# Patient Record
Sex: FEMALE | Race: WHITE | NOT HISPANIC OR LATINO
[De-identification: names, ages, dates, MRNs, and addresses within clinical notes are randomized per-mention and may not be internally consistent; named-entity substitution may affect disease eponyms.]

---

## 2018-03-15 ENCOUNTER — TRANSCRIPTION ENCOUNTER (OUTPATIENT)
Age: 73
End: 2018-03-15

## 2018-03-15 PROBLEM — Z00.00 ENCOUNTER FOR PREVENTIVE HEALTH EXAMINATION: Status: ACTIVE | Noted: 2018-03-15

## 2018-03-29 ENCOUNTER — OUTPATIENT (OUTPATIENT)
Dept: OUTPATIENT SERVICES | Facility: HOSPITAL | Age: 73
LOS: 1 days | End: 2018-03-29

## 2018-03-29 ENCOUNTER — APPOINTMENT (OUTPATIENT)
Dept: OPHTHALMOLOGY | Facility: CLINIC | Age: 73
End: 2018-03-29

## 2018-03-30 ENCOUNTER — TRANSCRIPTION ENCOUNTER (OUTPATIENT)
Age: 73
End: 2018-03-30

## 2018-03-30 DIAGNOSIS — H26.491 OTHER SECONDARY CATARACT, RIGHT EYE: ICD-10-CM

## 2018-04-12 ENCOUNTER — APPOINTMENT (OUTPATIENT)
Dept: OPHTHALMOLOGY | Facility: CLINIC | Age: 73
End: 2018-04-12

## 2018-04-12 ENCOUNTER — OUTPATIENT (OUTPATIENT)
Dept: OUTPATIENT SERVICES | Facility: HOSPITAL | Age: 73
LOS: 1 days | End: 2018-04-12

## 2018-04-12 DIAGNOSIS — H26.491 OTHER SECONDARY CATARACT, RIGHT EYE: ICD-10-CM

## 2018-04-17 ENCOUNTER — TRANSCRIPTION ENCOUNTER (OUTPATIENT)
Age: 73
End: 2018-04-17

## 2018-08-16 ENCOUNTER — INPATIENT (INPATIENT)
Facility: HOSPITAL | Age: 73
LOS: 13 days | Discharge: NURSING, REHAB OR LONG-TERM CARE | DRG: 871 | End: 2018-08-30
Payer: MEDICARE

## 2018-08-16 VITALS
DIASTOLIC BLOOD PRESSURE: 82 MMHG | SYSTOLIC BLOOD PRESSURE: 124 MMHG | HEART RATE: 115 BPM | WEIGHT: 134.04 LBS | TEMPERATURE: 100 F | RESPIRATION RATE: 18 BRPM | OXYGEN SATURATION: 97 % | HEIGHT: 64 IN

## 2018-08-16 LAB
ALBUMIN SERPL ELPH-MCNC: 4.1 G/DL — SIGNIFICANT CHANGE UP (ref 3.3–5)
ALP SERPL-CCNC: 116 U/L — SIGNIFICANT CHANGE UP (ref 40–120)
ALT FLD-CCNC: <5 U/L — LOW (ref 10–45)
ANION GAP SERPL CALC-SCNC: 16 MMOL/L — SIGNIFICANT CHANGE UP (ref 5–17)
APTT BLD: 25.8 SEC — LOW (ref 27.5–37.4)
AST SERPL-CCNC: 18 U/L — SIGNIFICANT CHANGE UP (ref 10–40)
BASE EXCESS BLDA CALC-SCNC: -5.8 MMOL/L — LOW (ref -2–3)
BASE EXCESS BLDV CALC-SCNC: -2.4 MMOL/L — SIGNIFICANT CHANGE UP
BASOPHILS NFR BLD AUTO: 0.2 % — SIGNIFICANT CHANGE UP (ref 0–2)
BILIRUB SERPL-MCNC: 0.6 MG/DL — SIGNIFICANT CHANGE UP (ref 0.2–1.2)
BUN SERPL-MCNC: 10 MG/DL — SIGNIFICANT CHANGE UP (ref 7–23)
CALCIUM SERPL-MCNC: 8.7 MG/DL — SIGNIFICANT CHANGE UP (ref 8.4–10.5)
CHLORIDE SERPL-SCNC: 95 MMOL/L — LOW (ref 96–108)
CK MB CFR SERPL CALC: 2.7 NG/ML — SIGNIFICANT CHANGE UP (ref 0–6.7)
CO2 SERPL-SCNC: 20 MMOL/L — LOW (ref 22–31)
CREAT SERPL-MCNC: 0.76 MG/DL — SIGNIFICANT CHANGE UP (ref 0.5–1.3)
EOSINOPHIL NFR BLD AUTO: 3.6 % — SIGNIFICANT CHANGE UP (ref 0–6)
GAS PNL BLDA: SIGNIFICANT CHANGE UP
GAS PNL BLDV: SIGNIFICANT CHANGE UP
GLUCOSE SERPL-MCNC: 118 MG/DL — HIGH (ref 70–99)
HCO3 BLDA-SCNC: 18 MMOL/L — LOW (ref 21–28)
HCO3 BLDV-SCNC: 22 MMOL/L — SIGNIFICANT CHANGE UP (ref 20–27)
HCT VFR BLD CALC: 26.5 % — LOW (ref 34.5–45)
HGB BLD-MCNC: 8.4 G/DL — LOW (ref 11.5–15.5)
INR BLD: 1.14 — SIGNIFICANT CHANGE UP (ref 0.88–1.16)
LACTATE SERPL-SCNC: 1.3 MMOL/L — SIGNIFICANT CHANGE UP (ref 0.5–2)
LYMPHOCYTES # BLD AUTO: 8.9 % — LOW (ref 13–44)
MAGNESIUM SERPL-MCNC: 1.6 MG/DL — SIGNIFICANT CHANGE UP (ref 1.6–2.6)
MCHC RBC-ENTMCNC: 29.8 PG — SIGNIFICANT CHANGE UP (ref 27–34)
MCHC RBC-ENTMCNC: 31.7 G/DL — LOW (ref 32–36)
MCV RBC AUTO: 94 FL — SIGNIFICANT CHANGE UP (ref 80–100)
MONOCYTES NFR BLD AUTO: 12.7 % — SIGNIFICANT CHANGE UP (ref 2–14)
NEUTROPHILS NFR BLD AUTO: 74.6 % — SIGNIFICANT CHANGE UP (ref 43–77)
NT-PROBNP SERPL-SCNC: 2246 PG/ML — HIGH (ref 0–300)
PCO2 BLDA: 30 MMHG — LOW (ref 32–45)
PCO2 BLDV: 35 MMHG — LOW (ref 41–51)
PH BLDA: 7.4 — SIGNIFICANT CHANGE UP (ref 7.35–7.45)
PH BLDV: 7.41 — SIGNIFICANT CHANGE UP (ref 7.32–7.43)
PLATELET # BLD AUTO: 308 K/UL — SIGNIFICANT CHANGE UP (ref 150–400)
PO2 BLDA: 145 MMHG — HIGH (ref 83–108)
PO2 BLDV: 58 MMHG — SIGNIFICANT CHANGE UP
POTASSIUM SERPL-MCNC: 3.4 MMOL/L — LOW (ref 3.5–5.3)
POTASSIUM SERPL-SCNC: 3.4 MMOL/L — LOW (ref 3.5–5.3)
PROT SERPL-MCNC: 6.7 G/DL — SIGNIFICANT CHANGE UP (ref 6–8.3)
PROTHROM AB SERPL-ACNC: 12.7 SEC — SIGNIFICANT CHANGE UP (ref 9.8–12.7)
RBC # BLD: 2.82 M/UL — LOW (ref 3.8–5.2)
RBC # FLD: 14.4 % — SIGNIFICANT CHANGE UP (ref 10.3–16.9)
SAO2 % BLDA: 99 % — SIGNIFICANT CHANGE UP (ref 95–100)
SAO2 % BLDV: 86 % — SIGNIFICANT CHANGE UP
SODIUM SERPL-SCNC: 131 MMOL/L — LOW (ref 135–145)
TROPONIN T SERPL-MCNC: <0.01 NG/ML — SIGNIFICANT CHANGE UP (ref 0–0.01)
WBC # BLD: 10.8 K/UL — HIGH (ref 3.8–10.5)
WBC # FLD AUTO: 10.8 K/UL — HIGH (ref 3.8–10.5)

## 2018-08-16 PROCEDURE — 71275 CT ANGIOGRAPHY CHEST: CPT | Mod: 26

## 2018-08-16 PROCEDURE — 93010 ELECTROCARDIOGRAM REPORT: CPT

## 2018-08-16 PROCEDURE — 99291 CRITICAL CARE FIRST HOUR: CPT

## 2018-08-16 PROCEDURE — 71045 X-RAY EXAM CHEST 1 VIEW: CPT | Mod: 26

## 2018-08-16 RX ORDER — SODIUM CHLORIDE 9 MG/ML
1000 INJECTION INTRAMUSCULAR; INTRAVENOUS; SUBCUTANEOUS ONCE
Qty: 0 | Refills: 0 | Status: COMPLETED | OUTPATIENT
Start: 2018-08-16 | End: 2018-08-16

## 2018-08-16 RX ORDER — VANCOMYCIN HCL 1 G
1000 VIAL (EA) INTRAVENOUS ONCE
Qty: 0 | Refills: 0 | Status: COMPLETED | OUTPATIENT
Start: 2018-08-16 | End: 2018-08-16

## 2018-08-16 RX ORDER — VANCOMYCIN HCL 1 G
1000 VIAL (EA) INTRAVENOUS EVERY 12 HOURS
Qty: 0 | Refills: 0 | Status: DISCONTINUED | OUTPATIENT
Start: 2018-08-17 | End: 2018-08-21

## 2018-08-16 RX ORDER — MAGNESIUM SULFATE 500 MG/ML
2 VIAL (ML) INJECTION ONCE
Qty: 0 | Refills: 0 | Status: COMPLETED | OUTPATIENT
Start: 2018-08-16 | End: 2018-08-16

## 2018-08-16 RX ORDER — PIPERACILLIN AND TAZOBACTAM 4; .5 G/20ML; G/20ML
3.38 INJECTION, POWDER, LYOPHILIZED, FOR SOLUTION INTRAVENOUS EVERY 6 HOURS
Qty: 0 | Refills: 0 | Status: DISCONTINUED | OUTPATIENT
Start: 2018-08-17 | End: 2018-08-22

## 2018-08-16 RX ORDER — IPRATROPIUM/ALBUTEROL SULFATE 18-103MCG
3 AEROSOL WITH ADAPTER (GRAM) INHALATION EVERY 4 HOURS
Qty: 0 | Refills: 0 | Status: DISCONTINUED | OUTPATIENT
Start: 2018-08-16 | End: 2018-08-24

## 2018-08-16 RX ORDER — IPRATROPIUM/ALBUTEROL SULFATE 18-103MCG
3 AEROSOL WITH ADAPTER (GRAM) INHALATION ONCE
Qty: 0 | Refills: 0 | Status: COMPLETED | OUTPATIENT
Start: 2018-08-16 | End: 2018-08-16

## 2018-08-16 RX ORDER — PIPERACILLIN AND TAZOBACTAM 4; .5 G/20ML; G/20ML
3.38 INJECTION, POWDER, LYOPHILIZED, FOR SOLUTION INTRAVENOUS ONCE
Qty: 0 | Refills: 0 | Status: COMPLETED | OUTPATIENT
Start: 2018-08-16 | End: 2018-08-16

## 2018-08-16 RX ADMIN — Medication 125 MILLIGRAM(S): at 18:00

## 2018-08-16 RX ADMIN — SODIUM CHLORIDE 1000 MILLILITER(S): 9 INJECTION INTRAMUSCULAR; INTRAVENOUS; SUBCUTANEOUS at 21:26

## 2018-08-16 RX ADMIN — PIPERACILLIN AND TAZOBACTAM 3.38 GRAM(S): 4; .5 INJECTION, POWDER, LYOPHILIZED, FOR SOLUTION INTRAVENOUS at 21:26

## 2018-08-16 RX ADMIN — PIPERACILLIN AND TAZOBACTAM 200 GRAM(S): 4; .5 INJECTION, POWDER, LYOPHILIZED, FOR SOLUTION INTRAVENOUS at 18:07

## 2018-08-16 RX ADMIN — Medication 50 GRAM(S): at 21:24

## 2018-08-16 RX ADMIN — SODIUM CHLORIDE 1000 MILLILITER(S): 9 INJECTION INTRAMUSCULAR; INTRAVENOUS; SUBCUTANEOUS at 18:03

## 2018-08-16 RX ADMIN — Medication 1000 MILLIGRAM(S): at 21:26

## 2018-08-16 RX ADMIN — Medication 3 MILLILITER(S): at 18:00

## 2018-08-16 RX ADMIN — SODIUM CHLORIDE 1000 MILLILITER(S): 9 INJECTION INTRAMUSCULAR; INTRAVENOUS; SUBCUTANEOUS at 01:18

## 2018-08-16 RX ADMIN — Medication 3 MILLILITER(S): at 22:07

## 2018-08-16 RX ADMIN — PIPERACILLIN AND TAZOBACTAM 200 GRAM(S): 4; .5 INJECTION, POWDER, LYOPHILIZED, FOR SOLUTION INTRAVENOUS at 23:38

## 2018-08-16 RX ADMIN — Medication 3 MILLILITER(S): at 19:16

## 2018-08-16 RX ADMIN — Medication 250 MILLIGRAM(S): at 18:03

## 2018-08-16 NOTE — CONSULT NOTE ADULT - ASSESSMENT
72F [facility] Parkinson's Disease, R. shoulder surgery(5da at [facility]) who was at rehab facility and experience acute onset shortness of breath for which she was brought to [facility], patient found to be septic with acute hypoxic respiratory failure requiring BiPAP for which ICU was consulted    #Acute Hypoxic Respiratory Failure: 2/2 PE v. Heart Failure v. Pneumonia. Patient history of sudden onset SOB and recent surgery raises concern for PE. No cardiac history reported, however pulmonary congestion on CXR and BNP 2200. Meeting 4/4 SIRS criteria no clear infiltrate on CXR, no urinary symptoms   -VBG(pH7.41, pCO2 35, pO2 58 HCO3 22)  -c/w BiPAP, repeat ABG in 2 hourse 72F [facility] Parkinson's Disease, R. shoulder surgery(7da at [facility]) who was at rehab facility and experience acute onset shortness of breath for which she was brought to [facility], patient found to be septic with acute hypoxic respiratory failure requiring BiPAP for which ICU was consulted    #Acute Hypoxic Respiratory Failure: 2/2 PE v. Heart Failure v. Pneumonia. Patient history of sudden onset SOB and recent surgery raises concern for PE. No cardiac history reported, however pulmonary congestion on CXR and BNP 2200. Meeting 4/4 SIRS criteria no clear infiltrate on CXR, no urinary symptoms. Cardiac enzymes negative w/o ischemic changes on EKG  -CT angio to evaluate for pulmonary embolism   -VBG(pH7.41, pCO2 35, pO2 58 HCO3 22)  -c/w BiPAP, repeat ABG in 2 hours  -Duonebs standing  -RVP, Blood Cx  -Echocardiogram to r/o structural heart disease    #Sepsis: 4/4 SIRS w/ acute respiratory failure. Sources include PNA, shoulder infection. Patient denies and  symptoms. Denies pulmonary symptoms. Feels that surgery site/pain is improving. CXR w/o clear infiltrate  -c/w Vanc+Zosyn and deescalate pending cultures  -f/u blood cultures  -Urinalysis  -RVP    Admit to medical stepdown unit

## 2018-08-16 NOTE — ED PROVIDER NOTE - MUSCULOSKELETAL, MLM
Spine appears normal, range of motion is not limited, no muscle or joint tenderness.  calf nt and no swelling bilaterally

## 2018-08-16 NOTE — ED ADULT NURSE REASSESSMENT NOTE - NS ED NURSE REASSESS COMMENT FT1
Dr Khan at bedside, MD and RN to accompany pt to CT and continually assess pt RR and dyspnea on bipap

## 2018-08-16 NOTE — CONSULT NOTE ADULT - SUBJECTIVE AND OBJECTIVE BOX
ICU Consult  72F [facility] Parkinson's Disease, R. shoulder surgery(5da at [facility]) who was at rehab facility and experience acute onset shortness of breath for which she was brought to [facility]. In the ED she was found to be tachypneic and hypoxic for which she was placed on BiPAP. Additional she met 4/4 SIRS criteria(Tmax 102.9R HR:115 RR:44, WBC). She was given Vanc+Zosyn, Solumedrol 125mg IVx1. ICU consulted for acute hypoxic respiratory failure. At the bedside patient comfortable on BiPAP. She states that SOB started acutely earlier today. No clear inciting event. No associated symptoms including no CP, Palp, Leg pain, Arm pain, Dizziness. Over the past few days days she reports that she was feeling well w/ no complaints except for improving right shoulder pain from surgery. Denies urinary symptoms, cough, fever/chills, n/v/d.     Vital Signs Last 24 Hrs  T(C): 39.4 (16 Aug 2018 17:55), Max: 39.4 (16 Aug 2018 17:55)  T(F): 102.9 (16 Aug 2018 17:55), Max: 102.9 (16 Aug 2018 17:55)  HR: 103 (16 Aug 2018 18:35) (73 - 118)  BP: 126/59 (16 Aug 2018 18:35) (110/64 - 130/65)  BP(mean): --  ABP: --  ABP(mean): --  RR: 34 (16 Aug 2018 18:35) (18 - 44)  SpO2: 100% (16 Aug 2018 18:35) (89% - 100%)      PHYSICAL EXAM:  Constitutional: NAD, non-toxic, lying in bed on BiPAP, tachypneic  HEENT: no eye discharge, no nasal discharge, no pharyngeal erythema, moist membranes  Neck: no lymphadenopathy, no JVD,  no carotid bruit  Cardiovascular: S1 and S2, RRR,  no M/R/G, non-displaced PMI  Respiratory: + wheezing, no rhonchi, no cough, no crackles   Gastrointestinal: BS+, soft, non-distended, non-tender, no ascites  Extremities: warm to touch, no edema, right shoulder dressed in guaze w/o surrounding erythema or pain  Vascular: 2+ peripheral pulses, normal capillary refill  Neurological: AAO x 4, PERRLA, EOMI, no nystagmus, 5/5 muscle strength, sensation intact   Psychiatric: normal mood, normal affect  Musculoskeletal: no joint swelling  Skin: No rashes, no skin breakdown      MEDICATIONS  (STANDING):    MEDICATIONS  (PRN):    Allergies    No Known Allergies    Intolerances      LABS:                        8.4    10.8  )-----------( 308      ( 16 Aug 2018 18:00 )             26.5     08-16    131<L>  |  95<L>  |  10  ----------------------------<  118<H>  3.4<L>   |  20<L>  |  0.76    Ca    8.7      16 Aug 2018 18:00  Mg     1.6     08-16    TPro  6.7  /  Alb  4.1  /  TBili  0.6  /  DBili  x   /  AST  18  /  ALT  <5<L>  /  AlkPhos  116  08-16    PT/INR - ( 16 Aug 2018 18:00 )   PT: 12.7 sec;   INR: 1.14          PTT - ( 16 Aug 2018 18:00 )  PTT:25.8 sec    RADIOLOGY & ADDITIONAL TESTS: Reviewed

## 2018-08-16 NOTE — ED ADULT NURSE NOTE - SEPSIS SCREEN SIGNS AND SYMPTOMS, MLM
heart rate greater than 90 beats/min/temp greater than 100.9 degrees F/38.3 degrees C/respiratory rate greater than 20 breaths/min

## 2018-08-16 NOTE — CONSULT NOTE ADULT - ATTENDING COMMENTS
Patient seen and examined with house-staff during bedside rounds.  Resident note read, including vitals, physical findings, laboratory data, and radiological reports.   Revisions included below.  Direct personal management at bed side and extensive interpretation of the data.  Plan was outlined and discussed in details with the housestaff.  Decision making of high complexity  Action taken for acute disease activity to reflect the level of care provided:  - medication reconciliation  - review laboratory data  The patient was seen in emergency room. I discussed the case with the resident and ER attending. I discussed the case with the family. The patient spiked temperature in the emergency room. The CT scan was ordered and reviewed the images.    Respiratory failure secondary to hospital acquired pneumonia, hypoxemia    Continue noninvasive ventilation    Continue IV antibiotic for hospital acquired pneumonia    Follow up on culture's    Follow up on Legionella urine antigen and pneumococcal urine antigen    Continue oxygen supplementation. Admitted to step down.

## 2018-08-16 NOTE — ED ADULT NURSE NOTE - OBJECTIVE STATEMENT
71 y/o F c/o SOB, witnessed cyanosis, tachypnea and fever. Pt is POD5 from R shoulder replacement, today feeling unwell with developing respiratory distress, per nephew who is her healthcare proxy and a ER physician. Pt BIBA, upon arrival RR 40s upgraded & placed in resuscitation room. #18g in place from EMS, second #18g placed RFA, blood cultures x2 lactate and bloods obtained at 1754, 2L NS infusing at 1755, placed on bipap at 1756, rectal tylenol administered and solumedrol administered at 1800, ekg done at 1804. Pharmacy at bedside, zosyn administered at 1807, vanco hung at 1818. Pt reports feeling much better at this time. Repains in rapid afib at 100-117bpm. Will continue to monitor closely

## 2018-08-16 NOTE — ED PROVIDER NOTE - MEDICAL DECISION MAKING DETAILS
73 yo female with recent shoulder surgery with one day of sob, wheezing.  has fever here 102.9, tachycardia, started on bipap with improvement.  iv fluids, abx started.  cxr showed inf right sided.  ct angio chest ordered ro PE.  Dr Smith saw patient in ED, will admit to step down

## 2018-08-16 NOTE — ED ADULT TRIAGE NOTE - CHIEF COMPLAINT QUOTE
Pt brought for CO SOB.  Upgraded to MD Almanza for r/o sepsis.  Pt arrived to ED tachypneic.  Per EMS, pt is s/p Right shoulder Sx.  Pt was admin 2 Duonebs in filed.  #18 PIV placed to Left FA by EMS, flushing well.

## 2018-08-16 NOTE — ED ADULT TRIAGE NOTE - SOURCE OF INFORMATION
sutured in place/positive blood return obtained via catheter/connected to a pressurized flush line/Seldinger technique/all materials/supplies accounted for at end of procedure/location identified, draped/prepped, sterile technique used, needle inserted/introduced/hemostasis with direct pressure, dressing applied
EMS/Patient

## 2018-08-16 NOTE — ED ADULT NURSE NOTE - NSIMPLEMENTINTERV_GEN_ALL_ED
Implemented All Universal Safety Interventions:  [place] to call system. Call bell, personal items and telephone within reach. Instruct patient to call for assistance. Room bathroom lighting operational. Non-slip footwear when patient is off stretcher. Physically safe environment: no spills, clutter or unnecessary equipment. Stretcher in lowest position, wheels locked, appropriate side rails in place.

## 2018-08-16 NOTE — ED PROVIDER NOTE - OBJECTIVE STATEMENT
sudden onset sob this afternoon  had recent shoulder surgery about 5 days ago  given nebs and oxygen by ems with improvement

## 2018-08-17 DIAGNOSIS — G20 PARKINSON'S DISEASE: ICD-10-CM

## 2018-08-17 DIAGNOSIS — Z96.611 PRESENCE OF RIGHT ARTIFICIAL SHOULDER JOINT: Chronic | ICD-10-CM

## 2018-08-17 DIAGNOSIS — Z91.89 OTHER SPECIFIED PERSONAL RISK FACTORS, NOT ELSEWHERE CLASSIFIED: ICD-10-CM

## 2018-08-17 DIAGNOSIS — J18.9 PNEUMONIA, UNSPECIFIED ORGANISM: ICD-10-CM

## 2018-08-17 DIAGNOSIS — R63.8 OTHER SYMPTOMS AND SIGNS CONCERNING FOOD AND FLUID INTAKE: ICD-10-CM

## 2018-08-17 DIAGNOSIS — E78.5 HYPERLIPIDEMIA, UNSPECIFIED: ICD-10-CM

## 2018-08-17 LAB
ANION GAP SERPL CALC-SCNC: 15 MMOL/L — SIGNIFICANT CHANGE UP (ref 5–17)
BUN SERPL-MCNC: 12 MG/DL — SIGNIFICANT CHANGE UP (ref 7–23)
CALCIUM SERPL-MCNC: 8.4 MG/DL — SIGNIFICANT CHANGE UP (ref 8.4–10.5)
CHLORIDE SERPL-SCNC: 110 MMOL/L — HIGH (ref 96–108)
CO2 SERPL-SCNC: 21 MMOL/L — LOW (ref 22–31)
CREAT SERPL-MCNC: 0.87 MG/DL — SIGNIFICANT CHANGE UP (ref 0.5–1.3)
GLUCOSE SERPL-MCNC: 102 MG/DL — HIGH (ref 70–99)
HCT VFR BLD CALC: 25.1 % — LOW (ref 34.5–45)
HGB BLD-MCNC: 7.7 G/DL — LOW (ref 11.5–15.5)
LEGIONELLA AG UR QL: NEGATIVE — SIGNIFICANT CHANGE UP
MAGNESIUM SERPL-MCNC: 2.2 MG/DL — SIGNIFICANT CHANGE UP (ref 1.6–2.6)
MCHC RBC-ENTMCNC: 29.3 PG — SIGNIFICANT CHANGE UP (ref 27–34)
MCHC RBC-ENTMCNC: 30.7 G/DL — LOW (ref 32–36)
MCV RBC AUTO: 95.4 FL — SIGNIFICANT CHANGE UP (ref 80–100)
PHOSPHATE SERPL-MCNC: 2.6 MG/DL — SIGNIFICANT CHANGE UP (ref 2.5–4.5)
PLATELET # BLD AUTO: 306 K/UL — SIGNIFICANT CHANGE UP (ref 150–400)
POTASSIUM SERPL-MCNC: 3.9 MMOL/L — SIGNIFICANT CHANGE UP (ref 3.5–5.3)
POTASSIUM SERPL-SCNC: 3.9 MMOL/L — SIGNIFICANT CHANGE UP (ref 3.5–5.3)
RAPID RVP RESULT: SIGNIFICANT CHANGE UP
RBC # BLD: 2.63 M/UL — LOW (ref 3.8–5.2)
RBC # FLD: 14.8 % — SIGNIFICANT CHANGE UP (ref 10.3–16.9)
SODIUM SERPL-SCNC: 146 MMOL/L — HIGH (ref 135–145)
WBC # BLD: 11.1 K/UL — HIGH (ref 3.8–10.5)
WBC # FLD AUTO: 11.1 K/UL — HIGH (ref 3.8–10.5)

## 2018-08-17 PROCEDURE — 71045 X-RAY EXAM CHEST 1 VIEW: CPT | Mod: 26

## 2018-08-17 PROCEDURE — 99223 1ST HOSP IP/OBS HIGH 75: CPT | Mod: GC

## 2018-08-17 RX ORDER — SELEGILINE HYDROCHLORIDE 1.25 MG/1
5 TABLET, ORALLY DISINTEGRATING ORAL
Qty: 0 | Refills: 0 | Status: DISCONTINUED | OUTPATIENT
Start: 2018-08-17 | End: 2018-08-29

## 2018-08-17 RX ORDER — SERTRALINE 25 MG/1
100 TABLET, FILM COATED ORAL
Qty: 0 | Refills: 0 | Status: DISCONTINUED | OUTPATIENT
Start: 2018-08-17 | End: 2018-08-30

## 2018-08-17 RX ORDER — PREGABALIN 225 MG/1
1000 CAPSULE ORAL DAILY
Qty: 0 | Refills: 0 | Status: DISCONTINUED | OUTPATIENT
Start: 2018-08-17 | End: 2018-08-30

## 2018-08-17 RX ORDER — PRIMIDONE 250 MG/1
50 TABLET ORAL
Qty: 0 | Refills: 0 | Status: DISCONTINUED | OUTPATIENT
Start: 2018-08-17 | End: 2018-08-30

## 2018-08-17 RX ORDER — CHOLECALCIFEROL (VITAMIN D3) 125 MCG
1000 CAPSULE ORAL DAILY
Qty: 0 | Refills: 0 | Status: DISCONTINUED | OUTPATIENT
Start: 2018-08-17 | End: 2018-08-30

## 2018-08-17 RX ORDER — CARBIDOPA AND LEVODOPA 25; 100 MG/1; MG/1
1.5 TABLET ORAL
Qty: 0 | Refills: 0 | Status: DISCONTINUED | OUTPATIENT
Start: 2018-08-17 | End: 2018-08-28

## 2018-08-17 RX ORDER — CARBIDOPA AND LEVODOPA 25; 100 MG/1; MG/1
1 TABLET ORAL
Qty: 0 | Refills: 0 | Status: DISCONTINUED | OUTPATIENT
Start: 2018-08-17 | End: 2018-08-28

## 2018-08-17 RX ORDER — ATORVASTATIN CALCIUM 80 MG/1
10 TABLET, FILM COATED ORAL AT BEDTIME
Qty: 0 | Refills: 0 | Status: DISCONTINUED | OUTPATIENT
Start: 2018-08-17 | End: 2018-08-30

## 2018-08-17 RX ORDER — ASPIRIN/CALCIUM CARB/MAGNESIUM 324 MG
81 TABLET ORAL DAILY
Qty: 0 | Refills: 0 | Status: DISCONTINUED | OUTPATIENT
Start: 2018-08-17 | End: 2018-08-20

## 2018-08-17 RX ORDER — IPRATROPIUM/ALBUTEROL SULFATE 18-103MCG
3 AEROSOL WITH ADAPTER (GRAM) INHALATION ONCE
Qty: 0 | Refills: 0 | Status: COMPLETED | OUTPATIENT
Start: 2018-08-17 | End: 2018-08-17

## 2018-08-17 RX ORDER — QUETIAPINE FUMARATE 200 MG/1
25 TABLET, FILM COATED ORAL
Qty: 0 | Refills: 0 | Status: DISCONTINUED | OUTPATIENT
Start: 2018-08-17 | End: 2018-08-30

## 2018-08-17 RX ADMIN — Medication 1000 UNIT(S): at 12:24

## 2018-08-17 RX ADMIN — PREGABALIN 1000 MICROGRAM(S): 225 CAPSULE ORAL at 12:24

## 2018-08-17 RX ADMIN — Medication 81 MILLIGRAM(S): at 11:54

## 2018-08-17 RX ADMIN — PIPERACILLIN AND TAZOBACTAM 200 GRAM(S): 4; .5 INJECTION, POWDER, LYOPHILIZED, FOR SOLUTION INTRAVENOUS at 17:19

## 2018-08-17 RX ADMIN — Medication 3 MILLILITER(S): at 13:49

## 2018-08-17 RX ADMIN — Medication 3 MILLILITER(S): at 18:15

## 2018-08-17 RX ADMIN — Medication 3 MILLILITER(S): at 01:04

## 2018-08-17 RX ADMIN — Medication 3 MILLILITER(S): at 22:23

## 2018-08-17 RX ADMIN — SELEGILINE HYDROCHLORIDE 5 MILLIGRAM(S): 1.25 TABLET, ORALLY DISINTEGRATING ORAL at 12:24

## 2018-08-17 RX ADMIN — Medication 3 MILLILITER(S): at 09:25

## 2018-08-17 RX ADMIN — PIPERACILLIN AND TAZOBACTAM 200 GRAM(S): 4; .5 INJECTION, POWDER, LYOPHILIZED, FOR SOLUTION INTRAVENOUS at 06:01

## 2018-08-17 RX ADMIN — Medication 125 MILLIGRAM(S): at 18:26

## 2018-08-17 RX ADMIN — PRIMIDONE 50 MILLIGRAM(S): 250 TABLET ORAL at 11:54

## 2018-08-17 RX ADMIN — Medication 250 MILLIGRAM(S): at 06:01

## 2018-08-17 RX ADMIN — QUETIAPINE FUMARATE 25 MILLIGRAM(S): 200 TABLET, FILM COATED ORAL at 22:24

## 2018-08-17 RX ADMIN — CARBIDOPA AND LEVODOPA 1.5 TABLET(S): 25; 100 TABLET ORAL at 11:55

## 2018-08-17 RX ADMIN — Medication 0.5 MILLIGRAM(S): at 19:19

## 2018-08-17 RX ADMIN — ATORVASTATIN CALCIUM 10 MILLIGRAM(S): 80 TABLET, FILM COATED ORAL at 22:23

## 2018-08-17 RX ADMIN — CARBIDOPA AND LEVODOPA 1.5 TABLET(S): 25; 100 TABLET ORAL at 16:51

## 2018-08-17 RX ADMIN — SERTRALINE 100 MILLIGRAM(S): 25 TABLET, FILM COATED ORAL at 11:55

## 2018-08-17 RX ADMIN — Medication 250 MILLIGRAM(S): at 18:26

## 2018-08-17 RX ADMIN — CARBIDOPA AND LEVODOPA 1 TABLET(S): 25; 100 TABLET ORAL at 22:23

## 2018-08-17 RX ADMIN — Medication 3 MILLILITER(S): at 16:33

## 2018-08-17 RX ADMIN — Medication 0.25 MILLIGRAM(S): at 22:23

## 2018-08-17 RX ADMIN — Medication 3 MILLILITER(S): at 06:02

## 2018-08-17 RX ADMIN — PIPERACILLIN AND TAZOBACTAM 200 GRAM(S): 4; .5 INJECTION, POWDER, LYOPHILIZED, FOR SOLUTION INTRAVENOUS at 11:57

## 2018-08-17 NOTE — PROGRESS NOTE ADULT - SUBJECTIVE AND OBJECTIVE BOX
OVERNIGHT EVENTS:    SUBJECTIVE / INTERVAL HPI: Patient seen and examined at bedside.     VITAL SIGNS:  Vital Signs Last 24 Hrs  T(C): 37.6 (17 Aug 2018 16:00), Max: 38 (17 Aug 2018 13:55)  T(F): 99.6 (17 Aug 2018 16:00), Max: 100.4 (17 Aug 2018 13:55)  HR: 56 (17 Aug 2018 18:44) (56 - 102)  BP: 135/63 (17 Aug 2018 18:44) (123/72 - 155/88)  BP(mean): 90 (17 Aug 2018 18:44) (83 - 113)  RR: 22 (17 Aug 2018 18:44) (16 - 29)  SpO2: 91% (17 Aug 2018 18:44) (91% - 100%)    PHYSICAL EXAM:    General: WDWN  HEENT: NC/AT; PERRL, anicteric sclera; MMM  Neck: supple  Cardiovascular: +S1/S2, RRR  Respiratory: CTA B/L; no W/R/R  Gastrointestinal: soft, NT/ND; +BSx4  Extremities: WWP; no edema, clubbing or cyanosis  Vascular: 2+ radial, DP/PT pulses B/L  Neurological: AAOx3; no focal deficits    MEDICATIONS:  MEDICATIONS  (STANDING):  ALBUTerol/ipratropium for Nebulization 3 milliLiter(s) Nebulizer every 4 hours  aspirin enteric coated 81 milliGRAM(s) Oral daily  atorvastatin 10 milliGRAM(s) Oral at bedtime  carbidopa/levodopa  25/100 1.5 Tablet(s) Oral <User Schedule>  carbidopa/levodopa CR 25/100 1 Tablet(s) Oral <User Schedule>  cholecalciferol 1000 Unit(s) Oral daily  cyanocobalamin 1000 MICROGram(s) Oral daily  piperacillin/tazobactam IVPB. 3.375 Gram(s) IV Intermittent every 6 hours  primidone 50 milliGRAM(s) Oral <User Schedule>  QUEtiapine 25 milliGRAM(s) Oral <User Schedule>  selegiline Oral Tab/Cap 5 milliGRAM(s) Oral <User Schedule>  sertraline 100 milliGRAM(s) Oral <User Schedule>  vancomycin  IVPB 1000 milliGRAM(s) IV Intermittent every 12 hours    MEDICATIONS  (PRN):      ALLERGIES:  Allergies    No Known Allergies    Intolerances        LABS:                        7.7    11.1  )-----------( 306      ( 17 Aug 2018 15:56 )             25.1     08-17    146<H>  |  110<H>  |  12  ----------------------------<  102<H>  3.9   |  21<L>  |  0.87    Ca    8.4      17 Aug 2018 15:56  Phos  2.6     08-17  Mg     2.2     08-17    TPro  6.7  /  Alb  4.1  /  TBili  0.6  /  DBili  x   /  AST  18  /  ALT  <5<L>  /  AlkPhos  116  08-16    PT/INR - ( 16 Aug 2018 18:00 )   PT: 12.7 sec;   INR: 1.14          PTT - ( 16 Aug 2018 18:00 )  PTT:25.8 sec    CAPILLARY BLOOD GLUCOSE          RADIOLOGY & ADDITIONAL TESTS: Reviewed. OVERNIGHT EVENTS:    SUBJECTIVE / INTERVAL HPI: Patient seen and examined at bedside. Patient admits to SOB. Denies chest pain, n/v/d/c.     VITAL SIGNS:  Vital Signs Last 24 Hrs  T(C): 37.6 (17 Aug 2018 16:00), Max: 38 (17 Aug 2018 13:55)  T(F): 99.6 (17 Aug 2018 16:00), Max: 100.4 (17 Aug 2018 13:55)  HR: 56 (17 Aug 2018 18:44) (56 - 102)  BP: 135/63 (17 Aug 2018 18:44) (123/72 - 155/88)  BP(mean): 90 (17 Aug 2018 18:44) (83 - 113)  RR: 22 (17 Aug 2018 18:44) (16 - 29)  SpO2: 91% (17 Aug 2018 18:44) (91% - 100%)    PHYSICAL EXAM:    General: WDWN  HEENT: NC/AT; PERRL, anicteric sclera; MMM  Neck: supple  Cardiovascular: +S1/S2, RRR  Respiratory: Crackles appreciated over right upper lung fields > left upper lung fields. Wheezing appreciated b/l in multiple lung fields. No rhonchi appreciated  Gastrointestinal: soft, NT/ND; +BSx4  Extremities: WWP; no edema, clubbing or cyanosis  Vascular: 2+ radial, DP/PT pulses B/L  Neurological: AAOx3; no focal deficits    MEDICATIONS:  MEDICATIONS  (STANDING):  ALBUTerol/ipratropium for Nebulization 3 milliLiter(s) Nebulizer every 4 hours  aspirin enteric coated 81 milliGRAM(s) Oral daily  atorvastatin 10 milliGRAM(s) Oral at bedtime  carbidopa/levodopa  25/100 1.5 Tablet(s) Oral <User Schedule>  carbidopa/levodopa CR 25/100 1 Tablet(s) Oral <User Schedule>  cholecalciferol 1000 Unit(s) Oral daily  cyanocobalamin 1000 MICROGram(s) Oral daily  piperacillin/tazobactam IVPB. 3.375 Gram(s) IV Intermittent every 6 hours  primidone 50 milliGRAM(s) Oral <User Schedule>  QUEtiapine 25 milliGRAM(s) Oral <User Schedule>  selegiline Oral Tab/Cap 5 milliGRAM(s) Oral <User Schedule>  sertraline 100 milliGRAM(s) Oral <User Schedule>  vancomycin  IVPB 1000 milliGRAM(s) IV Intermittent every 12 hours    MEDICATIONS  (PRN):      ALLERGIES:  Allergies    No Known Allergies    Intolerances        LABS:                        7.7    11.1  )-----------( 306      ( 17 Aug 2018 15:56 )             25.1     08-17    146<H>  |  110<H>  |  12  ----------------------------<  102<H>  3.9   |  21<L>  |  0.87    Ca    8.4      17 Aug 2018 15:56  Phos  2.6     08-17  Mg     2.2     08-17    TPro  6.7  /  Alb  4.1  /  TBili  0.6  /  DBili  x   /  AST  18  /  ALT  <5<L>  /  AlkPhos  116  08-16    PT/INR - ( 16 Aug 2018 18:00 )   PT: 12.7 sec;   INR: 1.14          PTT - ( 16 Aug 2018 18:00 )  PTT:25.8 sec    CAPILLARY BLOOD GLUCOSE          RADIOLOGY & ADDITIONAL TESTS: Reviewed.  < from: CT Angio Chest PE Protocol w/ IV Cont (08.16.18 @ 19:44) >  IMPRESSION:   1.  No definite pulmonary embolism. Limited assessment of segmental and   subsegmental branches as above.   2.  Consolidation within the right upper lobe. Likely an infectious   pneumonia.  3.  Findings compatible with tracheomalacia.  4.  Right glenohumeral arthroplasty with regional soft tissue swelling.

## 2018-08-17 NOTE — H&P ADULT - NSHPLABSRESULTS_GEN_ALL_CORE
LABS:                        8.4    10.8  )-----------( 308      ( 16 Aug 2018 18:00 )             26.5     08-16    131<L>  |  95<L>  |  10  ----------------------------<  118<H>  3.4<L>   |  20<L>  |  0.76    Ca    8.7      16 Aug 2018 18:00  Mg     1.6     08-16    TPro  6.7  /  Alb  4.1  /  TBili  0.6  /  DBili  x   /  AST  18  /  ALT  <5<L>  /  AlkPhos  116  08-16    PT/INR - ( 16 Aug 2018 18:00 )   PT: 12.7 sec;   INR: 1.14          PTT - ( 16 Aug 2018 18:00 )  PTT:25.8 sec    RVP negative    RADIOLOGY & ADDITIONAL TESTS: Reviewed.  CT PE - 1.  No definite pulmonary embolism. Limited assessment of segmental and   subsegmental branches as above.   2.  Consolidation within the right upper lobe. Likely an infectious   pneumonia.  3.  Findings compatible with tracheomalacia.  4.  Right glenohumeral arthroplasty with regional soft tissue swelling.

## 2018-08-17 NOTE — H&P ADULT - PROBLEM SELECTOR PLAN 1
- patient met 4/4 SIRS criteria - T 102, RR 25, , WBC 10.8  - CT PE negative for PE but showed possible RUL consolidation.  - vanc/zosyn  - f/u vanc trough  - standing duonebs  - wean bipap and monitor respiratory status - patient met 4/4 SIRS criteria - T 102, RR 25, , WBC 10.8  - CT PE negative for PE but showed possible RUL consolidation. RVP negative.  - vanc/zosyn  - VBG(pH7.41, pCO2 35, pO2 58 HCO3 22)  - echo to rule out structural heart disease  - f/u vanc trough  - standing duonebs  - wean bipap and monitor respiratory status  - legionella urine antigen and pneumococcal urine antigen - patient met 4/4 SIRS criteria - T 102, RR 25, , WBC 10.8  - CT PE negative for PE but showed possible RUL consolidation. RVP negative.  - vanc/zosyn  - VBG(pH7.41, pCO2 35, pO2 58 HCO3 22)  - echo to rule out structural heart disease  - f/u vanc trough  - standing duonebs  - wean bipap and monitor respiratory status  - legionella urine antigen and pneumococcal urine antigen  - consider lung u/s before d/c ing BIPAP since patient had crackles in all lung fields

## 2018-08-17 NOTE — H&P ADULT - ASSESSMENT
72F [facility] Parkinson's Disease, R. shoulder surgery (7da at [facility]) who was at rehab facility and experience acute onset shortness of breath 2/2 PNA.

## 2018-08-17 NOTE — PROGRESS NOTE ADULT - PROBLEM SELECTOR PLAN 5
F - No IVF at this time.  E - replete for K = 4 and Mg = 2  N - DASH diet    Dispo: 7l[facility]    DVT PPx: SCDs

## 2018-08-17 NOTE — H&P ADULT - HISTORY OF PRESENT ILLNESS
72F [facility] Parkinson's Disease, R. shoulder surgery (5da at [facility]) who was at rehab facility and experience acute onset shortness of breath for which she was brought to [facility]. In the ED she was found to be tachypneic and hypoxic for which she was placed on BiPAP. Additionally, she met 4/4 SIRS criteria (Tmax 102.9R HR:115 RR:44, WBC). She was given Vanc+Zosyn, Solumedrol 125mg IVx1. ICU consulted for acute hypoxic respiratory failure. At the bedside patient comfortable on BiPAP. She states that SOB started acutely earlier today. No clear inciting event. No associated symptoms including no CP, Palp, Leg pain, Arm pain, Dizziness. Over the past few days days she reports that she was feeling well w/ no complaints except for improving right shoulder pain from surgery. Denies urinary symptoms, cough, fever/chills, n/v/d.

## 2018-08-17 NOTE — PROGRESS NOTE ADULT - PROBLEM SELECTOR PLAN 6
1) PCP Contacted on Admission: (Y/N) --> Name & Phone #:  2) Date of Contact with PCP:  3) PCP Contacted at Discharge: (Y/N)  4) Summary of Handoff Given to PCP:   5) Post-Discharge Appointment Date and Location:    Case and plan discussed with primary team

## 2018-08-17 NOTE — PROGRESS NOTE ADULT - PROBLEM SELECTOR PLAN 2
Patient met 4/4 SIRS criteria - T 102, RR 25, , WBC 10.8 on admission. Lactate 1.3. RVP negative.  CT PE negative for PE but showed possible RUL consolidation.  - legionella urine antigen negative. Sepsis due to hospital acquired PNA.   -c/w vanc/zosyn  -pneumococcal urine antigen  -afebrile throughout day  -WBCs 11.1, continue to monitor  - VBG(pH7.41, pCO2 35, pO2 58 HCO3 22)  - f/u vanc trough  -c/w standing duonebs  - wean bipap and monitor respiratory status

## 2018-08-17 NOTE — PROGRESS NOTE ADULT - PROBLEM SELECTOR PLAN 1
Patient with productive green sputum cough, acute SOB, met 4/4 SIRS criteria on admission T 102, RR 25, , WBC 10.8. Was at [facility] 1 week ago for surgery. Initial concern for PE given acuity of symptoms but CT PE negative. CT demonstrated RUL consolidation consistent with hospital-acquired PNA  -c/w vanc/zosyn  -negative blood cultures to date. f/u final cultures  -negative legionella   -negative RVP    #Reactive airway disease - Patient requiring intermittent BiPAP for acute SOB, acute wheezing, and respiratory distress.   -c/w duonebs  -s/p IV solumederol  -repeat xray demonstrated worsening RUL consolidation, f/u official read  -c/w BiPAP PRN  -attempt to wean off BiPAP as can tolerate. Episodes of acute SOB, wheezing seem to be related to exercise.   - f/u echo to rule out structural heart disease

## 2018-08-17 NOTE — H&P ADULT - NSHPPHYSICALEXAM_GEN_ALL_CORE
PHYSICAL EXAM  General: elderly female in NAD; comfortable  HEENT: NC/AT, supple neck, MMM, no JVD  Respiratory: BiPAP in place. crackles in all lung lobes.   Cardiovascular: Regular rhythm/rate; S1/S2  Gastrointestinal: Soft; NTND; bowel sounds normal  Extremities: WWP; no edema; no clubbing; radial/pedal pulses palpable  Neurological:  A&Ox3; PERRL; EOMI; CNII-XII grossly intact; 5/5 strength; sensation intact; reflexes 2+; no obvious focal deficits  Skin: No rashes or ulcers, normal tugor

## 2018-08-17 NOTE — H&P ADULT - PROBLEM SELECTOR PLAN 3
1) PCP Contacted on Admission: (Y/N) --> Name & Phone #:  2) Date of Contact with PCP:  3) PCP Contacted at Discharge: (Y/N)  4) Summary of Handoff Given to PCP:   5) Post-Discharge Appointment Date and Location: F - No IVF at this time.  E - replete for K = 4 and Mg = 2  N - DASH diet

## 2018-08-18 LAB
ANION GAP SERPL CALC-SCNC: 14 MMOL/L — SIGNIFICANT CHANGE UP (ref 5–17)
BUN SERPL-MCNC: 14 MG/DL — SIGNIFICANT CHANGE UP (ref 7–23)
CALCIUM SERPL-MCNC: 8.9 MG/DL — SIGNIFICANT CHANGE UP (ref 8.4–10.5)
CHLORIDE SERPL-SCNC: 105 MMOL/L — SIGNIFICANT CHANGE UP (ref 96–108)
CO2 SERPL-SCNC: 20 MMOL/L — LOW (ref 22–31)
CREAT SERPL-MCNC: 0.76 MG/DL — SIGNIFICANT CHANGE UP (ref 0.5–1.3)
GLUCOSE SERPL-MCNC: 127 MG/DL — HIGH (ref 70–99)
HCT VFR BLD CALC: 25.2 % — LOW (ref 34.5–45)
HGB BLD-MCNC: 7.6 G/DL — LOW (ref 11.5–15.5)
MAGNESIUM SERPL-MCNC: 2.4 MG/DL — SIGNIFICANT CHANGE UP (ref 1.6–2.6)
MCHC RBC-ENTMCNC: 29 PG — SIGNIFICANT CHANGE UP (ref 27–34)
MCHC RBC-ENTMCNC: 30.2 G/DL — LOW (ref 32–36)
MCV RBC AUTO: 96.2 FL — SIGNIFICANT CHANGE UP (ref 80–100)
PHOSPHATE SERPL-MCNC: 3.8 MG/DL — SIGNIFICANT CHANGE UP (ref 2.5–4.5)
PLATELET # BLD AUTO: 264 K/UL — SIGNIFICANT CHANGE UP (ref 150–400)
POTASSIUM SERPL-MCNC: 4 MMOL/L — SIGNIFICANT CHANGE UP (ref 3.5–5.3)
POTASSIUM SERPL-SCNC: 4 MMOL/L — SIGNIFICANT CHANGE UP (ref 3.5–5.3)
RBC # BLD: 2.62 M/UL — LOW (ref 3.8–5.2)
RBC # FLD: 15.5 % — SIGNIFICANT CHANGE UP (ref 10.3–16.9)
SODIUM SERPL-SCNC: 139 MMOL/L — SIGNIFICANT CHANGE UP (ref 135–145)
VANCOMYCIN TROUGH SERPL-MCNC: 10.2 UG/ML — SIGNIFICANT CHANGE UP (ref 10–20)
WBC # BLD: 8.8 K/UL — SIGNIFICANT CHANGE UP (ref 3.8–10.5)
WBC # FLD AUTO: 8.8 K/UL — SIGNIFICANT CHANGE UP (ref 3.8–10.5)

## 2018-08-18 PROCEDURE — 71045 X-RAY EXAM CHEST 1 VIEW: CPT | Mod: 26

## 2018-08-18 PROCEDURE — 99233 SBSQ HOSP IP/OBS HIGH 50: CPT

## 2018-08-18 RX ADMIN — PREGABALIN 1000 MICROGRAM(S): 225 CAPSULE ORAL at 14:04

## 2018-08-18 RX ADMIN — PIPERACILLIN AND TAZOBACTAM 200 GRAM(S): 4; .5 INJECTION, POWDER, LYOPHILIZED, FOR SOLUTION INTRAVENOUS at 11:30

## 2018-08-18 RX ADMIN — Medication 3 MILLILITER(S): at 01:44

## 2018-08-18 RX ADMIN — Medication 3 MILLILITER(S): at 21:27

## 2018-08-18 RX ADMIN — Medication 3 MILLILITER(S): at 13:37

## 2018-08-18 RX ADMIN — ATORVASTATIN CALCIUM 10 MILLIGRAM(S): 80 TABLET, FILM COATED ORAL at 23:26

## 2018-08-18 RX ADMIN — QUETIAPINE FUMARATE 25 MILLIGRAM(S): 200 TABLET, FILM COATED ORAL at 23:27

## 2018-08-18 RX ADMIN — Medication 1000 UNIT(S): at 14:04

## 2018-08-18 RX ADMIN — Medication 3 MILLILITER(S): at 09:35

## 2018-08-18 RX ADMIN — Medication 81 MILLIGRAM(S): at 14:05

## 2018-08-18 RX ADMIN — Medication 0.5 MILLIGRAM(S): at 09:35

## 2018-08-18 RX ADMIN — SELEGILINE HYDROCHLORIDE 5 MILLIGRAM(S): 1.25 TABLET, ORALLY DISINTEGRATING ORAL at 14:02

## 2018-08-18 RX ADMIN — Medication 3 MILLILITER(S): at 16:41

## 2018-08-18 RX ADMIN — CARBIDOPA AND LEVODOPA 1.5 TABLET(S): 25; 100 TABLET ORAL at 14:04

## 2018-08-18 RX ADMIN — PIPERACILLIN AND TAZOBACTAM 200 GRAM(S): 4; .5 INJECTION, POWDER, LYOPHILIZED, FOR SOLUTION INTRAVENOUS at 07:20

## 2018-08-18 RX ADMIN — Medication 250 MILLIGRAM(S): at 07:20

## 2018-08-18 RX ADMIN — Medication 0.5 MILLIGRAM(S): at 18:36

## 2018-08-18 RX ADMIN — PIPERACILLIN AND TAZOBACTAM 200 GRAM(S): 4; .5 INJECTION, POWDER, LYOPHILIZED, FOR SOLUTION INTRAVENOUS at 17:24

## 2018-08-18 RX ADMIN — CARBIDOPA AND LEVODOPA 1 TABLET(S): 25; 100 TABLET ORAL at 23:43

## 2018-08-18 RX ADMIN — SERTRALINE 100 MILLIGRAM(S): 25 TABLET, FILM COATED ORAL at 14:02

## 2018-08-18 RX ADMIN — PIPERACILLIN AND TAZOBACTAM 200 GRAM(S): 4; .5 INJECTION, POWDER, LYOPHILIZED, FOR SOLUTION INTRAVENOUS at 01:44

## 2018-08-18 RX ADMIN — Medication 250 MILLIGRAM(S): at 18:36

## 2018-08-18 RX ADMIN — PRIMIDONE 50 MILLIGRAM(S): 250 TABLET ORAL at 14:02

## 2018-08-18 RX ADMIN — Medication 0.5 MILLIGRAM(S): at 22:51

## 2018-08-18 RX ADMIN — Medication 0.5 MILLIGRAM(S): at 15:24

## 2018-08-18 NOTE — PROGRESS NOTE ADULT - PROBLEM SELECTOR PLAN 1
Patient with productive green sputum cough, acute SOB, met 4/4 SIRS criteria on admission T 102, RR 25, , WBC 10.8. Was at [facility] 1 week ago for surgery. Initial concern for PE given acuity of symptoms but CT PE negative. CT demonstrated RUL consolidation consistent with hospital-acquired PNA  -c/w zosyn/vancomycin   -d/c vancomycin tomorrow AM  -negative blood cultures to date  -negative legionella   -negative RVP    #Reactive airway disease - Patient requiring intermittent BiPAP for acute SOB, acute wheezing, and respiratory distress.   -c/w duonebs  -s/p IV solumederol  -repeat xray demonstrated worsening RUL consolidation, f/u official read  -c/w BiPAP PRN attempt to wean off to HIFLO

## 2018-08-18 NOTE — PROGRESS NOTE ADULT - PROBLEM SELECTOR PLAN 2
Patient met 4/4 SIRS criteria - T 102, RR 25, , WBC 10.8 on admission. Lactate 1.3. RVP negative.  CT PE negative for PE but showed possible RUL consolidation.  - legionella urine antigen negative. Sepsis due to hospital acquired PNA.   -c/w vanc/zosyn  -pneumococcal urine antigen  -afebrile throughout day  -WBCs 11.1, continue to monitor

## 2018-08-18 NOTE — PROGRESS NOTE ADULT - SUBJECTIVE AND OBJECTIVE BOX
INTERVAL HPI/OVERNIGHT EVENTS:  Patient was seen and examined at bedside. Overnight give 0.25 ativan bipap and weaned to HFNC. Repeat CXR AM showed slight worsening on right lung field. At 6pm pt was tachypneic found to have wheezing, given duonebs with bipap and episode resolved.     VITAL SIGNS:  T(F): 98.8 (08-18-18 @ 18:00)  HR: 106 (08-18-18 @ 21:32)  BP: 120/58 (08-18-18 @ 20:39)  RR: 33 (08-18-18 @ 21:32)  SpO2: 95% (08-18-18 @ 21:32)  Wt(kg): --    PHYSICAL EXAM:    HEENT: NC/AT; PERRL, anicteric sclera; MMM  Neck: supple  Cardiovascular: +S1/S2, RRR  Respiratory: Crackles appreciated over right upper lung fields > left upper lung fields. Wheezing appreciated b/l in multiple lung fields. No rhonchi appreciated  Gastrointestinal: soft, NT/ND; +BSx4  Extremities: WWP; no edema, clubbing or cyanosis  Vascular: 2+ radial, DP/PT pulses B/L  Neurological: AAOx3; no focal deficits        MEDICATIONS  (STANDING):  ALBUTerol/ipratropium for Nebulization 3 milliLiter(s) Nebulizer every 4 hours  aspirin enteric coated 81 milliGRAM(s) Oral daily  atorvastatin 10 milliGRAM(s) Oral at bedtime  carbidopa/levodopa  25/100 1.5 Tablet(s) Oral <User Schedule>  carbidopa/levodopa CR 25/100 1 Tablet(s) Oral <User Schedule>  cholecalciferol 1000 Unit(s) Oral daily  cyanocobalamin 1000 MICROGram(s) Oral daily  LORazepam   Injectable 0.5 milliGRAM(s) IV Push once  piperacillin/tazobactam IVPB. 3.375 Gram(s) IV Intermittent every 6 hours  primidone 50 milliGRAM(s) Oral <User Schedule>  QUEtiapine 25 milliGRAM(s) Oral <User Schedule>  selegiline Oral Tab/Cap 5 milliGRAM(s) Oral <User Schedule>  sertraline 100 milliGRAM(s) Oral <User Schedule>  vancomycin  IVPB 1000 milliGRAM(s) IV Intermittent every 12 hours    MEDICATIONS  (PRN):  LORazepam   Injectable 0.5 milliGRAM(s) IV Push every 6 hours PRN Anxiety      Allergies    No Known Allergies    Intolerances        LABS:                        7.6    8.8   )-----------( 264      ( 18 Aug 2018 07:11 )             25.2     08-18    139  |  105  |  14  ----------------------------<  127<H>  4.0   |  20<L>  |  0.76    Ca    8.9      18 Aug 2018 07:11  Phos  3.8     08-18  Mg     2.4     08-18            RADIOLOGY & ADDITIONAL TESTS:  Reviewed

## 2018-08-18 NOTE — PROGRESS NOTE ADULT - SUBJECTIVE AND OBJECTIVE BOX
Patient is a 72y old  Female who presents with a chief complaint of       INTERVAL HPI/OVERNIGHT EVENTS: on BIPAP overnight    SYMPTOMS resting comfortably,no SOB, pain    DRIPS none        ICU Vital Signs Last 24 Hrs  T(C): 36.7 (18 Aug 2018 06:00), Max: 38 (17 Aug 2018 13:55)  T(F): 98 (18 Aug 2018 06:00), Max: 100.4 (17 Aug 2018 13:55)  HR: 79 (18 Aug 2018 05:51) (56 - 100)  BP: 135/63 (17 Aug 2018 18:44) (123/72 - 135/63)  BP(mean): 90 (17 Aug 2018 18:44) (83 - 96)  ABP: --  ABP(mean): --  RR: 27 (18 Aug 2018 05:51) (18 - 27)  SpO2: 96% (18 Aug 2018 05:51) (91% - 98%)      I&O's Summary    17 Aug 2018 07:01  -  18 Aug 2018 06:37  --------------------------------------------------------  IN: 0 mL / OUT: 300 mL / NET: -300 mL        EXAM    Chest ronchi    Heart rr    Abdomen soft nontender with bs    Extremities trace edema    Neuro at baseline      LABS:    ABG - ( 16 Aug 2018 19:27 )  pH: 7.40  /  pCO2: 30    /  pO2: 145   / HCO3: 18    / Base Excess: -5.8  /  SaO2: 99                                      7.7    11.1  )-----------( 306      ( 17 Aug 2018 15:56 )             25.1     08-17    146<H>  |  110<H>  |  12  ----------------------------<  102<H>  3.9   |  21<L>  |  0.87    Ca    8.4      17 Aug 2018 15:56  Phos  2.6     08-17  Mg     2.2     08-17    TPro  6.7  /  Alb  4.1  /  TBili  0.6  /  DBili  x   /  AST  18  /  ALT  <5<L>  /  AlkPhos  116  08-16    PT/INR - ( 16 Aug 2018 18:00 )   PT: 12.7 sec;   INR: 1.14          PTT - ( 16 Aug 2018 18:00 )  PTT:25.8 sec          RADIOLOGY & ADDITIONAL STUDIES:    CRITICAL CARE TIME SPENT:

## 2018-08-18 NOTE — PROGRESS NOTE ADULT - ASSESSMENT
72F [facility] Parkinson's Disease, R. shoulder surgery (7da at [facility]) who was at rehab facility and experience acute onset shortness of breath 2/2 PNA.

## 2018-08-18 NOTE — PROGRESS NOTE ADULT - ASSESSMENT
A - acute respiratory failure/COPD/pneumonia/sp shouldee surgery/Parkinson    Suggest  continue ABX, DC vanc in am in culture remain neg  bronchodilators - nebs  try to get off BIPAP to high flow  continue parkinsons meds  mobilize

## 2018-08-19 DIAGNOSIS — J96.01 ACUTE RESPIRATORY FAILURE WITH HYPOXIA: ICD-10-CM

## 2018-08-19 LAB
ANION GAP SERPL CALC-SCNC: 19 MMOL/L — HIGH (ref 5–17)
ANION GAP SERPL CALC-SCNC: 19 MMOL/L — HIGH (ref 5–17)
BASOPHILS NFR BLD AUTO: 0.1 % — SIGNIFICANT CHANGE UP (ref 0–2)
BUN SERPL-MCNC: 20 MG/DL — SIGNIFICANT CHANGE UP (ref 7–23)
BUN SERPL-MCNC: 22 MG/DL — SIGNIFICANT CHANGE UP (ref 7–23)
CALCIUM SERPL-MCNC: 8.5 MG/DL — SIGNIFICANT CHANGE UP (ref 8.4–10.5)
CALCIUM SERPL-MCNC: 8.7 MG/DL — SIGNIFICANT CHANGE UP (ref 8.4–10.5)
CHLORIDE SERPL-SCNC: 100 MMOL/L — SIGNIFICANT CHANGE UP (ref 96–108)
CHLORIDE SERPL-SCNC: 104 MMOL/L — SIGNIFICANT CHANGE UP (ref 96–108)
CK MB CFR SERPL CALC: 5.4 NG/ML — SIGNIFICANT CHANGE UP (ref 0–6.7)
CK SERPL-CCNC: 323 U/L — HIGH (ref 25–170)
CO2 SERPL-SCNC: 19 MMOL/L — LOW (ref 22–31)
CO2 SERPL-SCNC: 23 MMOL/L — SIGNIFICANT CHANGE UP (ref 22–31)
CREAT SERPL-MCNC: 0.79 MG/DL — SIGNIFICANT CHANGE UP (ref 0.5–1.3)
CREAT SERPL-MCNC: 0.81 MG/DL — SIGNIFICANT CHANGE UP (ref 0.5–1.3)
EOSINOPHIL NFR BLD AUTO: 0.1 % — SIGNIFICANT CHANGE UP (ref 0–6)
GLUCOSE SERPL-MCNC: 134 MG/DL — HIGH (ref 70–99)
GLUCOSE SERPL-MCNC: 145 MG/DL — HIGH (ref 70–99)
HCT VFR BLD CALC: 23.2 % — LOW (ref 34.5–45)
HGB BLD-MCNC: 7.2 G/DL — LOW (ref 11.5–15.5)
LACTATE SERPL-SCNC: 1.6 MMOL/L — SIGNIFICANT CHANGE UP (ref 0.5–2)
LYMPHOCYTES # BLD AUTO: 1.8 % — LOW (ref 13–44)
MAGNESIUM SERPL-MCNC: 2.3 MG/DL — SIGNIFICANT CHANGE UP (ref 1.6–2.6)
MCHC RBC-ENTMCNC: 29.6 PG — SIGNIFICANT CHANGE UP (ref 27–34)
MCHC RBC-ENTMCNC: 31 G/DL — LOW (ref 32–36)
MCV RBC AUTO: 95.5 FL — SIGNIFICANT CHANGE UP (ref 80–100)
MONOCYTES NFR BLD AUTO: 4.3 % — SIGNIFICANT CHANGE UP (ref 2–14)
NEUTROPHILS NFR BLD AUTO: 93.7 % — HIGH (ref 43–77)
NT-PROBNP SERPL-SCNC: 5903 PG/ML — HIGH (ref 0–300)
PLATELET # BLD AUTO: 316 K/UL — SIGNIFICANT CHANGE UP (ref 150–400)
POTASSIUM SERPL-MCNC: 3.9 MMOL/L — SIGNIFICANT CHANGE UP (ref 3.5–5.3)
POTASSIUM SERPL-MCNC: 4 MMOL/L — SIGNIFICANT CHANGE UP (ref 3.5–5.3)
POTASSIUM SERPL-SCNC: 3.9 MMOL/L — SIGNIFICANT CHANGE UP (ref 3.5–5.3)
POTASSIUM SERPL-SCNC: 4 MMOL/L — SIGNIFICANT CHANGE UP (ref 3.5–5.3)
RBC # BLD: 2.43 M/UL — LOW (ref 3.8–5.2)
RBC # FLD: 15.2 % — SIGNIFICANT CHANGE UP (ref 10.3–16.9)
SODIUM SERPL-SCNC: 142 MMOL/L — SIGNIFICANT CHANGE UP (ref 135–145)
SODIUM SERPL-SCNC: 142 MMOL/L — SIGNIFICANT CHANGE UP (ref 135–145)
TROPONIN T SERPL-MCNC: <0.01 NG/ML — SIGNIFICANT CHANGE UP (ref 0–0.01)
WBC # BLD: 10.5 K/UL — SIGNIFICANT CHANGE UP (ref 3.8–10.5)
WBC # FLD AUTO: 10.5 K/UL — SIGNIFICANT CHANGE UP (ref 3.8–10.5)

## 2018-08-19 PROCEDURE — 99233 SBSQ HOSP IP/OBS HIGH 50: CPT

## 2018-08-19 PROCEDURE — 71045 X-RAY EXAM CHEST 1 VIEW: CPT | Mod: 26

## 2018-08-19 PROCEDURE — 99232 SBSQ HOSP IP/OBS MODERATE 35: CPT

## 2018-08-19 RX ORDER — ACETAMINOPHEN 500 MG
1000 TABLET ORAL ONCE
Qty: 0 | Refills: 0 | Status: COMPLETED | OUTPATIENT
Start: 2018-08-19 | End: 2018-08-19

## 2018-08-19 RX ORDER — FUROSEMIDE 40 MG
20 TABLET ORAL ONCE
Qty: 0 | Refills: 0 | Status: COMPLETED | OUTPATIENT
Start: 2018-08-19 | End: 2018-08-19

## 2018-08-19 RX ORDER — FUROSEMIDE 40 MG
40 TABLET ORAL ONCE
Qty: 0 | Refills: 0 | Status: COMPLETED | OUTPATIENT
Start: 2018-08-19 | End: 2018-08-19

## 2018-08-19 RX ORDER — AZITHROMYCIN 500 MG/1
500 TABLET, FILM COATED ORAL ONCE
Qty: 0 | Refills: 0 | Status: COMPLETED | OUTPATIENT
Start: 2018-08-19 | End: 2018-08-19

## 2018-08-19 RX ORDER — IPRATROPIUM/ALBUTEROL SULFATE 18-103MCG
3 AEROSOL WITH ADAPTER (GRAM) INHALATION ONCE
Qty: 0 | Refills: 0 | Status: COMPLETED | OUTPATIENT
Start: 2018-08-19 | End: 2018-08-19

## 2018-08-19 RX ORDER — AZITHROMYCIN 500 MG/1
TABLET, FILM COATED ORAL
Qty: 0 | Refills: 0 | Status: DISCONTINUED | OUTPATIENT
Start: 2018-08-19 | End: 2018-08-21

## 2018-08-19 RX ORDER — AZITHROMYCIN 500 MG/1
500 TABLET, FILM COATED ORAL EVERY 24 HOURS
Qty: 0 | Refills: 0 | Status: DISCONTINUED | OUTPATIENT
Start: 2018-08-20 | End: 2018-08-21

## 2018-08-19 RX ADMIN — Medication 40 MILLIGRAM(S): at 10:27

## 2018-08-19 RX ADMIN — Medication 3 MILLILITER(S): at 02:23

## 2018-08-19 RX ADMIN — AZITHROMYCIN 255 MILLIGRAM(S): 500 TABLET, FILM COATED ORAL at 09:38

## 2018-08-19 RX ADMIN — Medication 40 MILLIGRAM(S): at 07:22

## 2018-08-19 RX ADMIN — Medication 20 MILLIGRAM(S): at 19:06

## 2018-08-19 RX ADMIN — PIPERACILLIN AND TAZOBACTAM 200 GRAM(S): 4; .5 INJECTION, POWDER, LYOPHILIZED, FOR SOLUTION INTRAVENOUS at 00:14

## 2018-08-19 RX ADMIN — CARBIDOPA AND LEVODOPA 1 TABLET(S): 25; 100 TABLET ORAL at 23:00

## 2018-08-19 RX ADMIN — Medication 400 MILLIGRAM(S): at 03:20

## 2018-08-19 RX ADMIN — SELEGILINE HYDROCHLORIDE 5 MILLIGRAM(S): 1.25 TABLET, ORALLY DISINTEGRATING ORAL at 15:11

## 2018-08-19 RX ADMIN — Medication 250 MILLIGRAM(S): at 07:26

## 2018-08-19 RX ADMIN — Medication 3 MILLILITER(S): at 15:57

## 2018-08-19 RX ADMIN — Medication 250 MILLIGRAM(S): at 17:25

## 2018-08-19 RX ADMIN — Medication 3 MILLILITER(S): at 19:07

## 2018-08-19 RX ADMIN — PIPERACILLIN AND TAZOBACTAM 200 GRAM(S): 4; .5 INJECTION, POWDER, LYOPHILIZED, FOR SOLUTION INTRAVENOUS at 11:24

## 2018-08-19 RX ADMIN — CARBIDOPA AND LEVODOPA 1.5 TABLET(S): 25; 100 TABLET ORAL at 16:24

## 2018-08-19 RX ADMIN — ATORVASTATIN CALCIUM 10 MILLIGRAM(S): 80 TABLET, FILM COATED ORAL at 22:24

## 2018-08-19 RX ADMIN — Medication 3 MILLILITER(S): at 22:24

## 2018-08-19 RX ADMIN — Medication 125 MILLIGRAM(S): at 00:26

## 2018-08-19 RX ADMIN — QUETIAPINE FUMARATE 25 MILLIGRAM(S): 200 TABLET, FILM COATED ORAL at 22:24

## 2018-08-19 RX ADMIN — PIPERACILLIN AND TAZOBACTAM 200 GRAM(S): 4; .5 INJECTION, POWDER, LYOPHILIZED, FOR SOLUTION INTRAVENOUS at 17:24

## 2018-08-19 RX ADMIN — Medication 40 MILLIGRAM(S): at 22:24

## 2018-08-19 RX ADMIN — PRIMIDONE 50 MILLIGRAM(S): 250 TABLET ORAL at 15:11

## 2018-08-19 RX ADMIN — Medication 3 MILLILITER(S): at 09:36

## 2018-08-19 RX ADMIN — PIPERACILLIN AND TAZOBACTAM 200 GRAM(S): 4; .5 INJECTION, POWDER, LYOPHILIZED, FOR SOLUTION INTRAVENOUS at 07:23

## 2018-08-19 RX ADMIN — Medication 3 MILLILITER(S): at 06:27

## 2018-08-19 RX ADMIN — Medication 3 MILLILITER(S): at 13:53

## 2018-08-19 NOTE — PROGRESS NOTE ADULT - PROBLEM SELECTOR PLAN 1
Patient noted to be tachypneic on BiPap but was conversational  - CXR shows evolving Rt sided and Lt sided pneumonia. Likely an element of fluid overload  - Bedside US done shows numerous B lines, Minimal effusion in the LLL, atelectasis and normal appearing LV function.  - WBC count is resolving, fever curve is down.    Recommend:  - Continue with Zosyn. Can consider to DC Vancomycin if needed  - Legionella negative, Currently on Azithromycin  - Consider HFNC for hypoxic resp failure. Bipap QHS PRN if needed  - Agree with solumedrol BID 40mg IV  - Duonebs q6hrs  - Recommend to diurese patient with lasix based on bedside US findings.  - Keep sats >90%

## 2018-08-19 NOTE — PROGRESS NOTE ADULT - ASSESSMENT
A -acute respiratory failure/ reactive airways disease/pneumonia    Suggest:  start lower dose steroids  continue abx  negative fluid balance, lasix  continue BIPAP as needed, try to get on high flow  mobilize

## 2018-08-19 NOTE — PROGRESS NOTE ADULT - ASSESSMENT
72F [facility] Parkinson's Disease, R. shoulder surgery (7da at [facility]) who was at rehab facility and experience acute onset shortness of breath 2/2 HAP PNA.

## 2018-08-19 NOTE — PROGRESS NOTE ADULT - SUBJECTIVE AND OBJECTIVE BOX
OVERNIGHT EVENTS: Duionebs and Ativan given before removing BiPAP, desatting to 80s when taken off BiPAP  given evening dose of Parkinson's medication 2h late. CXR with R sided congestion, no interval change from prior. Bilateral wheezing on exam. Solumdrol 125 given, started 40 q12h standing dose. S/p IV lasix 40mg x1      SUBJECTIVE / INTERVAL HPI: Patient seen and examined at bedside. BiPAP on. Patient with SOB. No chest pain, n/v/d/c. No fevers, chills, night sweats.     VITAL SIGNS:  Vital Signs Last 24 Hrs  T(C): 36.8 (19 Aug 2018 02:00), Max: 37.2 (18 Aug 2018 14:23)  T(F): 98.3 (19 Aug 2018 02:00), Max: 98.9 (18 Aug 2018 14:23)  HR: 80 (19 Aug 2018 08:50) (80 - 112)  BP: 139/67 (19 Aug 2018 08:50) (120/58 - 143/78)  BP(mean): 95 (19 Aug 2018 08:50) (84 - 101)  RR: 18 (19 Aug 2018 08:50) (17 - 43)  SpO2: 98% (19 Aug 2018 08:50) (90% - 98%)    PHYSICAL EXAM:    General: Patient appears uncomfortable, grimacing. Tacypneic, although sating well on BiPAP. Resting tremor appreciated  HEENT: NC/AT; PERRL, anicteric sclera; MMM  Neck: supple  Cardiovascular: +S1/S2, RRR  Respiratory: b/l crackles and wheezing appreciated throughout lung fields. Wheezing appreciated b/l in multiple lung fields. No rhonchi appreciated  Gastrointestinal: soft, NT/ND; +BSx4  Extremities: WWP; no edema, clubbing or cyanosis  Vascular: 2+ radial, DP/PT pulses B/L  Neurological: AAOx3; no focal deficits    MEDICATIONS:  MEDICATIONS  (STANDING):  ALBUTerol/ipratropium for Nebulization 3 milliLiter(s) Nebulizer every 4 hours  aspirin enteric coated 81 milliGRAM(s) Oral daily  atorvastatin 10 milliGRAM(s) Oral at bedtime  azithromycin  IVPB      azithromycin  IVPB 500 milliGRAM(s) IV Intermittent once  carbidopa/levodopa  25/100 1.5 Tablet(s) Oral <User Schedule>  carbidopa/levodopa CR 25/100 1 Tablet(s) Oral <User Schedule>  cholecalciferol 1000 Unit(s) Oral daily  cyanocobalamin 1000 MICROGram(s) Oral daily  methylPREDNISolone sodium succinate Injectable 40 milliGRAM(s) IV Push every 12 hours  piperacillin/tazobactam IVPB. 3.375 Gram(s) IV Intermittent every 6 hours  primidone 50 milliGRAM(s) Oral <User Schedule>  QUEtiapine 25 milliGRAM(s) Oral <User Schedule>  selegiline Oral Tab/Cap 5 milliGRAM(s) Oral <User Schedule>  sertraline 100 milliGRAM(s) Oral <User Schedule>  vancomycin  IVPB 1000 milliGRAM(s) IV Intermittent every 12 hours    MEDICATIONS  (PRN):  LORazepam   Injectable 0.5 milliGRAM(s) IV Push every 6 hours PRN Anxiety      ALLERGIES:  Allergies    No Known Allergies    Intolerances        LABS:                        7.2    10.5  )-----------( 316      ( 19 Aug 2018 05:44 )             23.2     08-19    142  |  104  |  20  ----------------------------<  134<H>  4.0   |  19<L>  |  0.81    Ca    8.5      19 Aug 2018 05:48  Phos  3.8     08-18  Mg     2.3     08-19          CAPILLARY BLOOD GLUCOSE          RADIOLOGY & ADDITIONAL TESTS: Reviewed.    < from: Xray Chest 1 View-PORTABLE IMMEDIATE (08.19.18 @ 00:50) >    IMPRESSION:  Stable small bilateral pleural effusions, left greater than right. No   significant change in right upper, mid, and lower lung field   consolidations. Stable left lower lung field consolidation versus   atelectasis.

## 2018-08-19 NOTE — PROGRESS NOTE ADULT - PROBLEM SELECTOR PLAN 1
Patient with productive green sputum cough, acute SOB, met 4/4 SIRS criteria on admission T 102, RR 25, , WBC 10.8. Was at [facility] 1 week ago for surgery. Initial concern for PE given acuity of symptoms but CT PE negative. CT demonstrated RUL consolidation consistent with hospital-acquired PNA  -c/w zosyn/vancomycin   -started azithromycin to for atypical coverage as patient clinically appears worse with worsening CXRs although most recent CXR 8/19 shows stable b/l effusion, RUL consolidation. continue to monitor  -negative blood cultures to date  -negative legionella   -negative RVP    #Reactive airway disease - Patient requiring intermittent BiPAP for acute SOB, acute wheezing, and respiratory distress. Patient receives solumedrol, duonebs, placed on BiPAP, and ativan 0.25mg as seems to be related to anxiety. HAP with b/l pleural effusions also likely contributing.   -c/w duonebs  -s/p IV solumedrol overnight. No starting standing IV solumedrol 40mg BID  -CXR 8/19 with stable small bilateral pleural effusions, left greater than right; No   significant change in right upper, mid, and lower lung field consolidations; Stable left lower lung field consolidation versus atelectasis.   -c/w BiPAP PRN   -consider standing lasix as b/l effusions, BNP trending upward, crackles on exam possibly suggesting worsening heart failure

## 2018-08-19 NOTE — PROGRESS NOTE ADULT - SUBJECTIVE AND OBJECTIVE BOX
Patient is a 72y old  Female who presents with a chief complaint of       INTERVAL HPI/OVERNIGHT EVENTS: overnight, sob receieved steroids    SYMPTOMS  above    DRIPS none        ICU Vital Signs Last 24 Hrs  T(C): 36.8 (19 Aug 2018 02:00), Max: 37.2 (18 Aug 2018 14:23)  T(F): 98.3 (19 Aug 2018 02:00), Max: 98.9 (18 Aug 2018 14:23)  HR: 96 (19 Aug 2018 00:42) (78 - 112)  BP: 120/61 (19 Aug 2018 00:39) (120/58 - 143/78)  BP(mean): 85 (19 Aug 2018 00:39) (84 - 108)  ABP: --  ABP(mean): --  RR: 33 (19 Aug 2018 00:42) (17 - 43)  SpO2: 98% (19 Aug 2018 00:42) (90% - 98%)      I&O's Summary    17 Aug 2018 07:01  -  18 Aug 2018 07:00  --------------------------------------------------------  IN: 0 mL / OUT: 300 mL / NET: -300 mL    18 Aug 2018 07:01  -  19 Aug 2018 06:16  --------------------------------------------------------  IN: 0 mL / OUT: 400 mL / NET: -400 mL        EXAM    Chest ronchi, prolonged expiratory phase    Heart rr    Abdomen soft nontender with bs    Extremities trace edema    Neuro intact      LABS:                            7.2    10.5  )-----------( 316      ( 19 Aug 2018 05:44 )             23.2     08-18    139  |  105  |  14  ----------------------------<  127<H>  4.0   |  20<L>  |  0.76    Ca    8.9      18 Aug 2018 07:11  Phos  3.8     08-18  Mg     2.4     08-18      cultures no results  CXR increased infiltrates, fluid overload?          RADIOLOGY & ADDITIONAL STUDIES:    CRITICAL CARE TIME SPENT:

## 2018-08-19 NOTE — PROGRESS NOTE ADULT - SUBJECTIVE AND OBJECTIVE BOX
Interval Events:  Patient seen and examined at bedside. Patient seen this am noted to be tachypneic but conversive. She says that her breathing is better.    MEDICATIONS:  Pulmonary:  ALBUTerol/ipratropium for Nebulization 3 milliLiter(s) Nebulizer every 4 hours    Antimicrobials:  azithromycin  IVPB      piperacillin/tazobactam IVPB. 3.375 Gram(s) IV Intermittent every 6 hours  vancomycin  IVPB 1000 milliGRAM(s) IV Intermittent every 12 hours    Anticoagulants:  aspirin enteric coated 81 milliGRAM(s) Oral daily    Cardiac:  furosemide   Injectable 20 milliGRAM(s) IV Push once    Endocrine:  atorvastatin 10 milliGRAM(s) Oral at bedtime  methylPREDNISolone sodium succinate Injectable 40 milliGRAM(s) IV Push every 12 hours    Allergies    No Known Allergies    Intolerances        Vital Signs Last 24 Hrs  T(C): 36.3 (19 Aug 2018 13:36), Max: 37.1 (18 Aug 2018 18:00)  T(F): 97.3 (19 Aug 2018 13:36), Max: 98.8 (18 Aug 2018 18:00)  HR: 74 (19 Aug 2018 15:05) (74 - 112)  BP: 136/76 (19 Aug 2018 15:05) (120/58 - 139/67)  BP(mean): 101 (19 Aug 2018 15:05) (84 - 101)  RR: 18 (19 Aug 2018 15:05) (17 - 43)  SpO2: 97% (19 Aug 2018 15:05) (91% - 99%)    08-18 @ 07:01  -  08-19 @ 07:00  --------------------------------------------------------  IN: 0 mL / OUT: 400 mL / NET: -400 mL    08-19 @ 07:01  -  08-19 @ 16:32  --------------------------------------------------------  IN: 0 mL / OUT: 300 mL / NET: -300 mL    General: In moderate distress, AAO x3  HEENT: No icterus,. Moist mucous membranes  Neck: No JVD noted. Supple, no meningismus  Cardio: S1, S2 noted, Irregualr, No murmurs, rubs or gallops  Resp: Rales Rt side, crackles b/l bases.  Abdo: Soft, NT, bowel sounds present. No organomegaly  Extremities: trace edema noted. Pulses present b/l  Neuro: AAO x3, grossly normal motor strength.  Skin: Dry, no rashes    LABS:      CBC Full  -  ( 19 Aug 2018 05:44 )  WBC Count : 10.5 K/uL  Hemoglobin : 7.2 g/dL  Hematocrit : 23.2 %  Platelet Count - Automated : 316 K/uL  Mean Cell Volume : 95.5 fL  Mean Cell Hemoglobin : 29.6 pg  Mean Cell Hemoglobin Concentration : 31.0 g/dL  Auto Neutrophil # : x  Auto Lymphocyte # : x  Auto Monocyte # : x  Auto Eosinophil # : x  Auto Basophil # : x  Auto Neutrophil % : 93.7 %  Auto Lymphocyte % : 1.8 %  Auto Monocyte % : 4.3 %  Auto Eosinophil % : 0.1 %  Auto Basophil % : 0.1 %    08-19    142  |  104  |  20  ----------------------------<  134<H>  4.0   |  19<L>  |  0.81    Ca    8.5      19 Aug 2018 05:48  Phos  3.8     08-18  Mg     2.3     08-19                        RADIOLOGY & ADDITIONAL STUDIES (The following images were personally reviewed):

## 2018-08-20 DIAGNOSIS — J15.6 PNEUMONIA DUE TO OTHER GRAM-NEGATIVE BACTERIA: ICD-10-CM

## 2018-08-20 LAB
ANION GAP SERPL CALC-SCNC: 14 MMOL/L — SIGNIFICANT CHANGE UP (ref 5–17)
BLD GP AB SCN SERPL QL: NEGATIVE — SIGNIFICANT CHANGE UP
BUN SERPL-MCNC: 21 MG/DL — SIGNIFICANT CHANGE UP (ref 7–23)
CALCIUM SERPL-MCNC: 8.4 MG/DL — SIGNIFICANT CHANGE UP (ref 8.4–10.5)
CHLORIDE SERPL-SCNC: 100 MMOL/L — SIGNIFICANT CHANGE UP (ref 96–108)
CO2 SERPL-SCNC: 26 MMOL/L — SIGNIFICANT CHANGE UP (ref 22–31)
CREAT SERPL-MCNC: 0.86 MG/DL — SIGNIFICANT CHANGE UP (ref 0.5–1.3)
FERRITIN SERPL-MCNC: 586 NG/ML — HIGH (ref 15–150)
GLUCOSE SERPL-MCNC: 122 MG/DL — HIGH (ref 70–99)
HCT VFR BLD CALC: 25.1 % — LOW (ref 34.5–45)
HGB BLD-MCNC: 7.8 G/DL — LOW (ref 11.5–15.5)
IRON SATN MFR SERPL: 14 % — SIGNIFICANT CHANGE UP (ref 14–50)
IRON SATN MFR SERPL: 28 UG/DL — LOW (ref 30–160)
MAGNESIUM SERPL-MCNC: 2.2 MG/DL — SIGNIFICANT CHANGE UP (ref 1.6–2.6)
MCHC RBC-ENTMCNC: 29.1 PG — SIGNIFICANT CHANGE UP (ref 27–34)
MCHC RBC-ENTMCNC: 31.1 G/DL — LOW (ref 32–36)
MCV RBC AUTO: 93.7 FL — SIGNIFICANT CHANGE UP (ref 80–100)
PLATELET # BLD AUTO: 394 K/UL — SIGNIFICANT CHANGE UP (ref 150–400)
POTASSIUM SERPL-MCNC: 3.5 MMOL/L — SIGNIFICANT CHANGE UP (ref 3.5–5.3)
POTASSIUM SERPL-SCNC: 3.5 MMOL/L — SIGNIFICANT CHANGE UP (ref 3.5–5.3)
RBC # BLD: 2.68 M/UL — LOW (ref 3.8–5.2)
RBC # FLD: 15.2 % — SIGNIFICANT CHANGE UP (ref 10.3–16.9)
RH IG SCN BLD-IMP: POSITIVE — SIGNIFICANT CHANGE UP
SODIUM SERPL-SCNC: 140 MMOL/L — SIGNIFICANT CHANGE UP (ref 135–145)
TIBC SERPL-MCNC: 195 UG/DL — LOW (ref 220–430)
TRANSFERRIN SERPL-MCNC: 158 MG/DL — LOW (ref 200–360)
UIBC SERPL-MCNC: 167 UG/DL — SIGNIFICANT CHANGE UP (ref 110–370)
VANCOMYCIN TROUGH SERPL-MCNC: 19.2 UG/ML — SIGNIFICANT CHANGE UP (ref 10–20)
WBC # BLD: 11.2 K/UL — HIGH (ref 3.8–10.5)
WBC # FLD AUTO: 11.2 K/UL — HIGH (ref 3.8–10.5)

## 2018-08-20 PROCEDURE — 71045 X-RAY EXAM CHEST 1 VIEW: CPT | Mod: 26

## 2018-08-20 PROCEDURE — 99233 SBSQ HOSP IP/OBS HIGH 50: CPT | Mod: GC

## 2018-08-20 PROCEDURE — 93306 TTE W/DOPPLER COMPLETE: CPT | Mod: 26

## 2018-08-20 RX ORDER — FUROSEMIDE 40 MG
20 TABLET ORAL DAILY
Qty: 0 | Refills: 0 | Status: DISCONTINUED | OUTPATIENT
Start: 2018-08-20 | End: 2018-08-21

## 2018-08-20 RX ORDER — IPRATROPIUM/ALBUTEROL SULFATE 18-103MCG
3 AEROSOL WITH ADAPTER (GRAM) INHALATION ONCE
Qty: 0 | Refills: 0 | Status: COMPLETED | OUTPATIENT
Start: 2018-08-20 | End: 2018-08-20

## 2018-08-20 RX ORDER — BUDESONIDE, MICRONIZED 100 %
0.5 POWDER (GRAM) MISCELLANEOUS EVERY 12 HOURS
Qty: 0 | Refills: 0 | Status: DISCONTINUED | OUTPATIENT
Start: 2018-08-20 | End: 2018-08-30

## 2018-08-20 RX ADMIN — PREGABALIN 1000 MICROGRAM(S): 225 CAPSULE ORAL at 12:14

## 2018-08-20 RX ADMIN — CARBIDOPA AND LEVODOPA 1.5 TABLET(S): 25; 100 TABLET ORAL at 12:14

## 2018-08-20 RX ADMIN — Medication 40 MILLIGRAM(S): at 21:32

## 2018-08-20 RX ADMIN — CARBIDOPA AND LEVODOPA 1 TABLET(S): 25; 100 TABLET ORAL at 21:33

## 2018-08-20 RX ADMIN — CARBIDOPA AND LEVODOPA 1.5 TABLET(S): 25; 100 TABLET ORAL at 09:06

## 2018-08-20 RX ADMIN — Medication 40 MILLIGRAM(S): at 14:25

## 2018-08-20 RX ADMIN — ATORVASTATIN CALCIUM 10 MILLIGRAM(S): 80 TABLET, FILM COATED ORAL at 21:32

## 2018-08-20 RX ADMIN — PIPERACILLIN AND TAZOBACTAM 200 GRAM(S): 4; .5 INJECTION, POWDER, LYOPHILIZED, FOR SOLUTION INTRAVENOUS at 12:14

## 2018-08-20 RX ADMIN — Medication 3 MILLILITER(S): at 12:15

## 2018-08-20 RX ADMIN — QUETIAPINE FUMARATE 25 MILLIGRAM(S): 200 TABLET, FILM COATED ORAL at 21:32

## 2018-08-20 RX ADMIN — Medication 40 MILLIGRAM(S): at 09:06

## 2018-08-20 RX ADMIN — Medication 3 MILLILITER(S): at 01:04

## 2018-08-20 RX ADMIN — Medication 1000 MILLIGRAM(S): at 19:05

## 2018-08-20 RX ADMIN — SELEGILINE HYDROCHLORIDE 5 MILLIGRAM(S): 1.25 TABLET, ORALLY DISINTEGRATING ORAL at 08:51

## 2018-08-20 RX ADMIN — PIPERACILLIN AND TAZOBACTAM 200 GRAM(S): 4; .5 INJECTION, POWDER, LYOPHILIZED, FOR SOLUTION INTRAVENOUS at 06:52

## 2018-08-20 RX ADMIN — PIPERACILLIN AND TAZOBACTAM 200 GRAM(S): 4; .5 INJECTION, POWDER, LYOPHILIZED, FOR SOLUTION INTRAVENOUS at 01:04

## 2018-08-20 RX ADMIN — Medication 0.5 MILLIGRAM(S): at 21:32

## 2018-08-20 RX ADMIN — Medication 3 MILLILITER(S): at 08:51

## 2018-08-20 RX ADMIN — SERTRALINE 100 MILLIGRAM(S): 25 TABLET, FILM COATED ORAL at 08:51

## 2018-08-20 RX ADMIN — Medication 3 MILLILITER(S): at 21:32

## 2018-08-20 RX ADMIN — CARBIDOPA AND LEVODOPA 1.5 TABLET(S): 25; 100 TABLET ORAL at 16:08

## 2018-08-20 RX ADMIN — Medication 3 MILLILITER(S): at 17:14

## 2018-08-20 RX ADMIN — Medication 3 MILLILITER(S): at 06:54

## 2018-08-20 RX ADMIN — Medication 3 MILLILITER(S): at 13:51

## 2018-08-20 RX ADMIN — PIPERACILLIN AND TAZOBACTAM 200 GRAM(S): 4; .5 INJECTION, POWDER, LYOPHILIZED, FOR SOLUTION INTRAVENOUS at 17:15

## 2018-08-20 RX ADMIN — AZITHROMYCIN 255 MILLIGRAM(S): 500 TABLET, FILM COATED ORAL at 07:23

## 2018-08-20 RX ADMIN — Medication 0.5 MILLIGRAM(S): at 21:34

## 2018-08-20 RX ADMIN — Medication 20 MILLIGRAM(S): at 15:01

## 2018-08-20 RX ADMIN — PRIMIDONE 50 MILLIGRAM(S): 250 TABLET ORAL at 08:51

## 2018-08-20 RX ADMIN — Medication 250 MILLIGRAM(S): at 18:29

## 2018-08-20 RX ADMIN — Medication 1000 UNIT(S): at 12:14

## 2018-08-20 RX ADMIN — Medication 250 MILLIGRAM(S): at 06:52

## 2018-08-20 NOTE — SWALLOW BEDSIDE ASSESSMENT ADULT - COMMENTS
Pt awake, alert, pleasant and cooperative. Increased work of breathing noted with use of abdominal muscles and expiratory wheezing on ascultation. Pt on high flow nasal canula.

## 2018-08-20 NOTE — PROGRESS NOTE ADULT - PROBLEM SELECTOR PLAN 1
Patient with productive green sputum cough, acute SOB, met 4/4 SIRS criteria on admission T 102, RR 25, , WBC 10.8. Was at [facility] 1 week ago for surgery. Initial concern for PE given acuity of symptoms but CT PE negative. CT demonstrated RUL consolidation consistent with hospital-acquired PNA  -c/w zosyn/vancomycin and azithromycin  -vanc trough 19.2, maintaining vanc dose given goal for severe infection 15-20  -next vanc trough 8/22 AM lab  -f/u CXR 8/20 demonstrated interval improvement of RUL   -negative blood cultures to date  -negative legionella   -negative RVP  -Echo 8/20 demonstrated EF 65%, dilated left atrium, moderate to severe MR, pulmonary artery systolic pressure 50mmhg.  -started PO lasix 20mg daily and PO enalapril 2.5 mg daily    #Reactive airway disease - Patient requiring intermittent BiPAP for acute SOB, acute wheezing, and respiratory distress. Patient receives solumedrol, duonebs, placed on BiPAP, and ativan 0.25mg as seems to be related to anxiety. HAP with b/l pleural effusions also likely contributing. Possibly undiagnosed asthma exacerbation  CXR chest demonstrates right upper lobe regions of patchy consolidation and interstitial pulmonary edema throughout the bilateral upper lobes and bilateral lower lobes. Low lung volumes. Normal heart size. Small layering persistent bilateral pleural effusions. Reversed right shoulder arthroplasty.  -c/w duonebs q4hrs  -c/w IV solumedrol 40mg TID   -c/w pulmicort 0.5mg BID  -c/w BiPAP PRN

## 2018-08-20 NOTE — PROGRESS NOTE ADULT - PROBLEM SELECTOR PLAN 2
Patient met 4/4 SIRS criteria - T 102, RR 25, , WBC 10.8 on admission. Lactate 1.3. RVP negative.  CT PE negative for PE but showed possible RUL consolidation.  - legionella urine antigen negative. Sepsis due to hospital acquired PNA.   -c/w vanc/zosyn and azithromycin  -pneumococcal urine antigen  -afebrile throughout day  -WBCs 11.1, continue to monitor

## 2018-08-20 NOTE — SWALLOW BEDSIDE ASSESSMENT ADULT - NS SPL SWALLOW CLINIC TRIAL FT
Pharyngeal swallow is significant for suspected decreased coordination between respiration and deglutition in setting of increased work of breathing and high oxygen requirements which resulted in overt signs of airway protection deficits. Pt would benefit from an instrumental assessment to further assess swallow function once respiratory status is stable. Consider NPO.

## 2018-08-20 NOTE — PROGRESS NOTE ADULT - SUBJECTIVE AND OBJECTIVE BOX
OVERNIGHT EVENTS:    SUBJECTIVE / INTERVAL HPI: Patient seen and examined at bedside.     VITAL SIGNS:  Vital Signs Last 24 Hrs  T(C): 37.4 (20 Aug 2018 09:51), Max: 37.4 (20 Aug 2018 09:51)  T(F): 99.3 (20 Aug 2018 09:51), Max: 99.3 (20 Aug 2018 09:51)  HR: 86 (20 Aug 2018 09:13) (72 - 92)  BP: 102/60 (20 Aug 2018 08:25) (102/60 - 143/65)  BP(mean): 77 (20 Aug 2018 08:25) (77 - 101)  RR: 19 (20 Aug 2018 09:13) (16 - 22)  SpO2: 96% (20 Aug 2018 09:13) (93% - 99%)    PHYSICAL EXAM:    General: WDWN  HEENT: NC/AT; PERRL, anicteric sclera; MMM  Neck: supple  Cardiovascular: +S1/S2, RRR  Respiratory: CTA B/L; no W/R/R  Gastrointestinal: soft, NT/ND; +BSx4  Extremities: WWP; no edema, clubbing or cyanosis  Vascular: 2+ radial, DP/PT pulses B/L  Neurological: AAOx3; no focal deficits    MEDICATIONS:  MEDICATIONS  (STANDING):  ALBUTerol/ipratropium for Nebulization 3 milliLiter(s) Nebulizer every 4 hours  atorvastatin 10 milliGRAM(s) Oral at bedtime  azithromycin  IVPB      azithromycin  IVPB 500 milliGRAM(s) IV Intermittent every 24 hours  buDESOnide   0.5 milliGRAM(s) Respule 0.5 milliGRAM(s) Inhalation every 12 hours  carbidopa/levodopa  25/100 1.5 Tablet(s) Oral <User Schedule>  carbidopa/levodopa CR 25/100 1 Tablet(s) Oral <User Schedule>  cholecalciferol 1000 Unit(s) Oral daily  cyanocobalamin 1000 MICROGram(s) Oral daily  methylPREDNISolone sodium succinate Injectable 40 milliGRAM(s) IV Push every 8 hours  piperacillin/tazobactam IVPB. 3.375 Gram(s) IV Intermittent every 6 hours  primidone 50 milliGRAM(s) Oral <User Schedule>  QUEtiapine 25 milliGRAM(s) Oral <User Schedule>  selegiline Oral Tab/Cap 5 milliGRAM(s) Oral <User Schedule>  sertraline 100 milliGRAM(s) Oral <User Schedule>  vancomycin  IVPB 1000 milliGRAM(s) IV Intermittent every 12 hours    MEDICATIONS  (PRN):  LORazepam   Injectable 0.5 milliGRAM(s) IV Push every 6 hours PRN Anxiety      ALLERGIES:  Allergies    No Known Allergies    Intolerances        LABS:                        7.8    11.2  )-----------( 394      ( 20 Aug 2018 06:30 )             25.1     08-20    140  |  100  |  21  ----------------------------<  122<H>  3.5   |  26  |  0.86    Ca    8.4      20 Aug 2018 06:30  Mg     2.2     08-20          CAPILLARY BLOOD GLUCOSE          RADIOLOGY & ADDITIONAL TESTS: Reviewed. OVERNIGHT EVENTS: ILENE    SUBJECTIVE / INTERVAL HPI: Patient seen and examined at bedside. Patient admitting to some improved SOB, although SOB still present. Patient denies chest pain, n/v/d/c, fevers, chills, night swears    VITAL SIGNS:  Vital Signs Last 24 Hrs  T(C): 37.4 (20 Aug 2018 09:51), Max: 37.4 (20 Aug 2018 09:51)  T(F): 99.3 (20 Aug 2018 09:51), Max: 99.3 (20 Aug 2018 09:51)  HR: 86 (20 Aug 2018 09:13) (72 - 92)  BP: 102/60 (20 Aug 2018 08:25) (102/60 - 143/65)  BP(mean): 77 (20 Aug 2018 08:25) (77 - 101)  RR: 19 (20 Aug 2018 09:13) (16 - 22)  SpO2: 96% (20 Aug 2018 09:13) (93% - 99%)    PHYSICAL EXAM:    General: Patient laying comfortably in bed. Resting tremor appreciated. NAD.  HEENT: PERRL, anicteric sclera; MMM  Neck: supple  Cardiovascular: +S1/S2, RRR  Respiratory: b/l crackles and wheezing again appreciated throughout lung fields. No rhonchi appreciated  Gastrointestinal: soft, NT/ND; +BSx4  Extremities: No erythema, no edema, no cyanosis b/l  Vascular: 2+ radial, posterior tibial pulses B/L  Neurological: AAOx3; no focal deficits    MEDICATIONS:  MEDICATIONS  (STANDING):  ALBUTerol/ipratropium for Nebulization 3 milliLiter(s) Nebulizer every 4 hours  atorvastatin 10 milliGRAM(s) Oral at bedtime  azithromycin  IVPB      azithromycin  IVPB 500 milliGRAM(s) IV Intermittent every 24 hours  buDESOnide   0.5 milliGRAM(s) Respule 0.5 milliGRAM(s) Inhalation every 12 hours  carbidopa/levodopa  25/100 1.5 Tablet(s) Oral <User Schedule>  carbidopa/levodopa CR 25/100 1 Tablet(s) Oral <User Schedule>  cholecalciferol 1000 Unit(s) Oral daily  cyanocobalamin 1000 MICROGram(s) Oral daily  methylPREDNISolone sodium succinate Injectable 40 milliGRAM(s) IV Push every 8 hours  piperacillin/tazobactam IVPB. 3.375 Gram(s) IV Intermittent every 6 hours  primidone 50 milliGRAM(s) Oral <User Schedule>  QUEtiapine 25 milliGRAM(s) Oral <User Schedule>  selegiline Oral Tab/Cap 5 milliGRAM(s) Oral <User Schedule>  sertraline 100 milliGRAM(s) Oral <User Schedule>  vancomycin  IVPB 1000 milliGRAM(s) IV Intermittent every 12 hours    MEDICATIONS  (PRN):  LORazepam   Injectable 0.5 milliGRAM(s) IV Push every 6 hours PRN Anxiety      ALLERGIES:  Allergies    No Known Allergies    Intolerances        LABS:                        7.8    11.2  )-----------( 394      ( 20 Aug 2018 06:30 )             25.1     08-20    140  |  100  |  21  ----------------------------<  122<H>  3.5   |  26  |  0.86    Ca    8.4      20 Aug 2018 06:30  Mg     2.2     08-20          CAPILLARY BLOOD GLUCOSE          RADIOLOGY & ADDITIONAL TESTS: Reviewed.    < from: Xray Chest 1 View- PORTABLE-Urgent (08.20.18 @ 09:13) >  IMPRESSION: Frontal view of the chest is compared to 8/19/2018 and   demonstrates interval improvement in aeration of the right upper lobe   with persistent regions of patchy consolidation and interstitial   pulmonary edema throughout the bilateral upper lobes and bilateral lower   lobes. Low lung volumes. Normal heart size. Small layering persistent   bilateral pleural effusions. Reversed right shoulder arthroplasty.    < from: Echocardiogram (08.20.18 @ 11:40) >  Interpretation Summary  The left atrium is dilated. Right atrial size isnormal.There is mild   aortic   valve thickening. The aortic valve is trileaflet. No aortic regurgitation   noted. No hemodynamically significant valvular aortic stenosis.  There   is mild   mitral valve thickening. There is mild mitral annular calcification.   There is   moderate to severe mitral regurgitation.  Structurally normal tricuspid   valve.   There is mild tricuspid regurgitation.The pulmonary artery systolic   pressure   is estimated to be 50 mmHg.This study shows evidence of pulmonary   hypertension.The pulmonic valve is not well visualized. There is trace   pulmonic regurgitation.  The right ventricle is dilated.The right   ventricular   systolic function is normal.There is mild concentric left ventricular   hypertrophy. The left ventricular wall motion is normal. The left   ventricular   ejection fraction is 65%.  No aortic root dilatation.There is no   pericardial   effusion.No prior study for comparison.

## 2018-08-20 NOTE — SWALLOW BEDSIDE ASSESSMENT ADULT - ASR SWALLOW ASPIRATION MONITOR
change of breathing pattern/position upright (90Y)/pneumonia/oral hygiene/cough/fever/throat clearing/upper respiratory infection/gurgly voice

## 2018-08-20 NOTE — SWALLOW BEDSIDE ASSESSMENT ADULT - SWALLOW EVAL: DIAGNOSIS
Suspected pharyngeal dysphagia in setting of SOB and increased work of breathing judged to be due to decreased coordination between respiration and deglutition

## 2018-08-20 NOTE — PROGRESS NOTE ADULT - ASSESSMENT
72F [facility] Parkinson's Disease, R. shoulder surgery (7da at [facility]) who was at rehab facility and experience acute onset shortness of breath 2/2 HAP PNA.

## 2018-08-20 NOTE — PROGRESS NOTE ADULT - PROBLEM SELECTOR PLAN 5
F - No IVF at this time.  E - replete for K < 4 and Mg < 2  N - NPO given S&S recs. Consider restarting DASH diet pending clinical improvement.    Dispo: [facility]    DVT PPx: SCDs

## 2018-08-21 DIAGNOSIS — I34.0 NONRHEUMATIC MITRAL (VALVE) INSUFFICIENCY: ICD-10-CM

## 2018-08-21 LAB
ANION GAP SERPL CALC-SCNC: 14 MMOL/L — SIGNIFICANT CHANGE UP (ref 5–17)
ANION GAP SERPL CALC-SCNC: 16 MMOL/L — SIGNIFICANT CHANGE UP (ref 5–17)
BUN SERPL-MCNC: 20 MG/DL — SIGNIFICANT CHANGE UP (ref 7–23)
BUN SERPL-MCNC: 21 MG/DL — SIGNIFICANT CHANGE UP (ref 7–23)
CALCIUM SERPL-MCNC: 8.6 MG/DL — SIGNIFICANT CHANGE UP (ref 8.4–10.5)
CALCIUM SERPL-MCNC: 8.6 MG/DL — SIGNIFICANT CHANGE UP (ref 8.4–10.5)
CHLORIDE SERPL-SCNC: 98 MMOL/L — SIGNIFICANT CHANGE UP (ref 96–108)
CHLORIDE SERPL-SCNC: 98 MMOL/L — SIGNIFICANT CHANGE UP (ref 96–108)
CO2 SERPL-SCNC: 25 MMOL/L — SIGNIFICANT CHANGE UP (ref 22–31)
CO2 SERPL-SCNC: 27 MMOL/L — SIGNIFICANT CHANGE UP (ref 22–31)
CREAT SERPL-MCNC: 0.75 MG/DL — SIGNIFICANT CHANGE UP (ref 0.5–1.3)
CREAT SERPL-MCNC: 0.84 MG/DL — SIGNIFICANT CHANGE UP (ref 0.5–1.3)
CULTURE RESULTS: SIGNIFICANT CHANGE UP
CULTURE RESULTS: SIGNIFICANT CHANGE UP
GLUCOSE SERPL-MCNC: 128 MG/DL — HIGH (ref 70–99)
GLUCOSE SERPL-MCNC: 144 MG/DL — HIGH (ref 70–99)
GRAM STN FLD: SIGNIFICANT CHANGE UP
HCT VFR BLD CALC: 25.6 % — LOW (ref 34.5–45)
HGB BLD-MCNC: 7.7 G/DL — LOW (ref 11.5–15.5)
MAGNESIUM SERPL-MCNC: 2.2 MG/DL — SIGNIFICANT CHANGE UP (ref 1.6–2.6)
MCHC RBC-ENTMCNC: 28.1 PG — SIGNIFICANT CHANGE UP (ref 27–34)
MCHC RBC-ENTMCNC: 30.1 G/DL — LOW (ref 32–36)
MCV RBC AUTO: 93.4 FL — SIGNIFICANT CHANGE UP (ref 80–100)
PHOSPHATE SERPL-MCNC: 5.1 MG/DL — HIGH (ref 2.5–4.5)
PLATELET # BLD AUTO: 425 K/UL — HIGH (ref 150–400)
POTASSIUM SERPL-MCNC: 3 MMOL/L — LOW (ref 3.5–5.3)
POTASSIUM SERPL-MCNC: 3.3 MMOL/L — LOW (ref 3.5–5.3)
POTASSIUM SERPL-SCNC: 3 MMOL/L — LOW (ref 3.5–5.3)
POTASSIUM SERPL-SCNC: 3.3 MMOL/L — LOW (ref 3.5–5.3)
RBC # BLD: 2.74 M/UL — LOW (ref 3.8–5.2)
RBC # FLD: 15 % — SIGNIFICANT CHANGE UP (ref 10.3–16.9)
SODIUM SERPL-SCNC: 139 MMOL/L — SIGNIFICANT CHANGE UP (ref 135–145)
SODIUM SERPL-SCNC: 139 MMOL/L — SIGNIFICANT CHANGE UP (ref 135–145)
SPECIMEN SOURCE: SIGNIFICANT CHANGE UP
VANCOMYCIN TROUGH SERPL-MCNC: 24.1 UG/ML — HIGH (ref 10–20)
WBC # BLD: 7.4 K/UL — SIGNIFICANT CHANGE UP (ref 3.8–10.5)
WBC # FLD AUTO: 7.4 K/UL — SIGNIFICANT CHANGE UP (ref 3.8–10.5)

## 2018-08-21 PROCEDURE — 99223 1ST HOSP IP/OBS HIGH 75: CPT

## 2018-08-21 PROCEDURE — 74230 X-RAY XM SWLNG FUNCJ C+: CPT | Mod: 26

## 2018-08-21 PROCEDURE — 99233 SBSQ HOSP IP/OBS HIGH 50: CPT | Mod: GC

## 2018-08-21 PROCEDURE — 71045 X-RAY EXAM CHEST 1 VIEW: CPT | Mod: 26

## 2018-08-21 RX ORDER — FUROSEMIDE 40 MG
20 TABLET ORAL EVERY 12 HOURS
Qty: 0 | Refills: 0 | Status: DISCONTINUED | OUTPATIENT
Start: 2018-08-21 | End: 2018-08-23

## 2018-08-21 RX ORDER — HEPARIN SODIUM 5000 [USP'U]/ML
5000 INJECTION INTRAVENOUS; SUBCUTANEOUS EVERY 8 HOURS
Qty: 0 | Refills: 0 | Status: DISCONTINUED | OUTPATIENT
Start: 2018-08-21 | End: 2018-08-30

## 2018-08-21 RX ORDER — ASPIRIN/CALCIUM CARB/MAGNESIUM 324 MG
81 TABLET ORAL DAILY
Qty: 0 | Refills: 0 | Status: DISCONTINUED | OUTPATIENT
Start: 2018-08-21 | End: 2018-08-30

## 2018-08-21 RX ORDER — POTASSIUM CHLORIDE 20 MEQ
40 PACKET (EA) ORAL EVERY 4 HOURS
Qty: 0 | Refills: 0 | Status: DISCONTINUED | OUTPATIENT
Start: 2018-08-21 | End: 2018-08-22

## 2018-08-21 RX ORDER — AZITHROMYCIN 500 MG/1
500 TABLET, FILM COATED ORAL
Qty: 0 | Refills: 0 | Status: DISCONTINUED | OUTPATIENT
Start: 2018-08-21 | End: 2018-08-30

## 2018-08-21 RX ORDER — HEPARIN SODIUM 5000 [USP'U]/ML
5000 INJECTION INTRAVENOUS; SUBCUTANEOUS EVERY 12 HOURS
Qty: 0 | Refills: 0 | Status: DISCONTINUED | OUTPATIENT
Start: 2018-08-21 | End: 2018-08-21

## 2018-08-21 RX ORDER — POTASSIUM CHLORIDE 20 MEQ
10 PACKET (EA) ORAL
Qty: 0 | Refills: 0 | Status: COMPLETED | OUTPATIENT
Start: 2018-08-21 | End: 2018-08-21

## 2018-08-21 RX ADMIN — PIPERACILLIN AND TAZOBACTAM 200 GRAM(S): 4; .5 INJECTION, POWDER, LYOPHILIZED, FOR SOLUTION INTRAVENOUS at 06:09

## 2018-08-21 RX ADMIN — PIPERACILLIN AND TAZOBACTAM 200 GRAM(S): 4; .5 INJECTION, POWDER, LYOPHILIZED, FOR SOLUTION INTRAVENOUS at 17:55

## 2018-08-21 RX ADMIN — PREGABALIN 1000 MICROGRAM(S): 225 CAPSULE ORAL at 16:05

## 2018-08-21 RX ADMIN — SELEGILINE HYDROCHLORIDE 5 MILLIGRAM(S): 1.25 TABLET, ORALLY DISINTEGRATING ORAL at 07:35

## 2018-08-21 RX ADMIN — Medication 3 MILLILITER(S): at 13:00

## 2018-08-21 RX ADMIN — Medication 250 MILLIGRAM(S): at 17:55

## 2018-08-21 RX ADMIN — Medication 3 MILLILITER(S): at 06:10

## 2018-08-21 RX ADMIN — Medication 250 MILLIGRAM(S): at 06:09

## 2018-08-21 RX ADMIN — Medication 100 MILLIEQUIVALENT(S): at 09:51

## 2018-08-21 RX ADMIN — Medication 40 MILLIGRAM(S): at 06:09

## 2018-08-21 RX ADMIN — SERTRALINE 100 MILLIGRAM(S): 25 TABLET, FILM COATED ORAL at 07:35

## 2018-08-21 RX ADMIN — Medication 40 MILLIGRAM(S): at 16:05

## 2018-08-21 RX ADMIN — Medication 0.5 MILLIGRAM(S): at 09:24

## 2018-08-21 RX ADMIN — Medication 2.5 MILLIGRAM(S): at 06:56

## 2018-08-21 RX ADMIN — PIPERACILLIN AND TAZOBACTAM 200 GRAM(S): 4; .5 INJECTION, POWDER, LYOPHILIZED, FOR SOLUTION INTRAVENOUS at 12:00

## 2018-08-21 RX ADMIN — QUETIAPINE FUMARATE 25 MILLIGRAM(S): 200 TABLET, FILM COATED ORAL at 21:37

## 2018-08-21 RX ADMIN — Medication 3 MILLILITER(S): at 00:18

## 2018-08-21 RX ADMIN — Medication 100 MILLIEQUIVALENT(S): at 12:00

## 2018-08-21 RX ADMIN — ATORVASTATIN CALCIUM 10 MILLIGRAM(S): 80 TABLET, FILM COATED ORAL at 21:40

## 2018-08-21 RX ADMIN — Medication 3 MILLILITER(S): at 21:38

## 2018-08-21 RX ADMIN — CARBIDOPA AND LEVODOPA 1 TABLET(S): 25; 100 TABLET ORAL at 21:38

## 2018-08-21 RX ADMIN — CARBIDOPA AND LEVODOPA 1.5 TABLET(S): 25; 100 TABLET ORAL at 12:01

## 2018-08-21 RX ADMIN — PIPERACILLIN AND TAZOBACTAM 200 GRAM(S): 4; .5 INJECTION, POWDER, LYOPHILIZED, FOR SOLUTION INTRAVENOUS at 00:18

## 2018-08-21 RX ADMIN — Medication 0.5 MILLIGRAM(S): at 21:38

## 2018-08-21 RX ADMIN — Medication 20 MILLIGRAM(S): at 06:56

## 2018-08-21 RX ADMIN — AZITHROMYCIN 255 MILLIGRAM(S): 500 TABLET, FILM COATED ORAL at 06:56

## 2018-08-21 RX ADMIN — Medication 1000 UNIT(S): at 16:05

## 2018-08-21 RX ADMIN — Medication 3 MILLILITER(S): at 17:09

## 2018-08-21 RX ADMIN — Medication 81 MILLIGRAM(S): at 16:06

## 2018-08-21 RX ADMIN — PRIMIDONE 50 MILLIGRAM(S): 250 TABLET ORAL at 07:35

## 2018-08-21 RX ADMIN — CARBIDOPA AND LEVODOPA 1.5 TABLET(S): 25; 100 TABLET ORAL at 07:08

## 2018-08-21 RX ADMIN — Medication 40 MILLIEQUIVALENT(S): at 21:38

## 2018-08-21 RX ADMIN — Medication 20 MILLIGRAM(S): at 17:55

## 2018-08-21 RX ADMIN — HEPARIN SODIUM 5000 UNIT(S): 5000 INJECTION INTRAVENOUS; SUBCUTANEOUS at 16:05

## 2018-08-21 RX ADMIN — Medication 3 MILLILITER(S): at 09:24

## 2018-08-21 RX ADMIN — CARBIDOPA AND LEVODOPA 1.5 TABLET(S): 25; 100 TABLET ORAL at 16:06

## 2018-08-21 NOTE — DIETITIAN INITIAL EVALUATION ADULT. - ENERGY NEEDS
Height 64"; #; #; 112%IBW  BMI 23  ABW used for calculations as pt between % of IBW. Needs estimated for maintenance in older adults

## 2018-08-21 NOTE — PROGRESS NOTE ADULT - PROBLEM SELECTOR PLAN 5
F - No IVF at this time.  E - replete for K < 4 and Mg < 2  N - Passed barium swallow. c/w regular diet with thin liquids and small sips to drink    #Hypokalemia: patient found to be hypokalemic 3.3 on AM lab then repeat 5PM BMP with K+ of 3.0 despite repletion. Hypokalemia likely 2/2 lasix  -replete PRN  -continue to monitor q8-12hr BMP     Dispo: [facility]    DVT PPx: SCDs

## 2018-08-21 NOTE — SWALLOW VFSS/MBS ASSESSMENT ADULT - NS SWALLOW VFSS REC ASPIR MON
fever/pneumonia/throat clearing/upper respiratory infection/position upright (90Y)/cough/gurgly voice/change of breathing pattern/oral hygiene

## 2018-08-21 NOTE — SWALLOW VFSS/MBS ASSESSMENT ADULT - DIAGNOSTIC IMPRESSIONS
Pt presents with a mild doreen-pharyngeal dysphagia characterized by overall swallow weakness which results in reduced bolus clearance efficiency and reduced airway protection. There was trace penetration with large volume of thin liquids without noted aspiration. Pt is judged to be at increased aspiration risk.

## 2018-08-21 NOTE — CONSULT NOTE ADULT - ASSESSMENT
72 year old female with family history of premature cardiac death in both parents (54 in mother from malignant HTN, and 50 in father from MI) but with no family history of valvular disease and medical history of Parkinson's disease, recent right shoulder surgery at [facility] on 8/9/18, and rheumatic fever as a child who initially presented to [facility] ED with sudden onset acute SOB, cough, and wheeze.  In ED, she was noted to be tachypneic and hypoxic with Tmax 102.9F, HR 115bpm, RR 44, and with leukocytosis.  She was started on empiric Vancomycin/Zosyn and given Solumedrol for SIRS and admitted to ICU for medical management of acute hypoxic respiratory failure.  Patient reports progressive SOB at rest and HANSON over the last year with intermittent non-productive cough and wheeze without LE edema or orthopnea.  During her hospital course, she underwent ECHO on 8/20/18 demonstrated dilated LA, mild MV thickening with moderate to severe MR, mild TR, PASP 50mmHg, dilated RV, and EF 65%.  Dr. Delgado, Structural Heart, was consulted for evaluation of moderate to severe MR for possible procedural intervention.    Plan:   1. Moderate to severe MR  -Medical management per primary team  -Will discuss case with Dr. Delgado and make further recommendations after.   -Procedural candidacy to be determined.     Thank you for this consultation, will continue to follow.  Please call [phone] with any questions or concerns.

## 2018-08-21 NOTE — DIETITIAN INITIAL EVALUATION ADULT. - OTHER INFO
74 yo/female with PMHx Parkinsons, HLD, recent R. shoulder surgery, presented from rehab with SOB. Pt found to be tachypneic and hypoxic, likely 2/2 HAP. Pt seen in room, awake, alert, very pleasant, celebrating birthday today. Pt breathing comfortably on NC. She endorses very good appetite at home PTA, regular diet. Currently NPO, as she failed her SLP exam on 8/20 2/2 increased work of breathing and high O2 requirements per SLP note. Plan for MBS today. She denies N/V/C/D at this time. NKFA. Skin noted w/surgical wound (r. shoulder). No pain endorsed at this time. No education provided at this time 2/2 NPO status-will follow per policy.

## 2018-08-21 NOTE — SWALLOW VFSS/MBS ASSESSMENT ADULT - PHARYNGEAL PHASE COMMENTS
Pharyngeal stage was significant for reduced base of tongue to posterior pharyngeal wall contact which resulted in valleculae and base of tongue residue. Epiglottis made contact with posterior pharyngeal wall without full inversion judged to be due to spinal kyphosis. There was limited hyolaryngeal elevation and excursion which resulted in pyriform sinus residue. Trace penetration during and after the swallow with large cup sips was noted due to incomplete airway closure. Pt with blunted sensory response to penetration.

## 2018-08-21 NOTE — PROGRESS NOTE ADULT - PROBLEM SELECTOR PLAN 1
Patient with productive green sputum cough, acute SOB, met 4/4 SIRS criteria on admission T 102, RR 25, , WBC 10.8. Was at [facility] 1 week ago for surgery. Initial concern for PE given acuity of symptoms but CT PE negative. CT demonstrated RUL consolidation consistent with hospital-acquired PNA  -c/w zosyn/vancomycin  -azythromycin dosed to MWF for anti-inflammatory benefit, atypical coverage not thought to be necessary at this point  -f/u AM vanc trough 8/22, restart vanc with goal of 15-20  -f/u CXR with improvement in pulmonary edema  -negative blood cultures to date  -negative legionella   -negative RVP  -Echo 8/20 demonstrated EF 65%, dilated left atrium, moderate to severe MR, pulmonary artery systolic pressure 50mmhg.  -c/w PO enalapril 2.5 mg daily. Increased lasix dose to IV lasix 20mg BID  -f/u sputum cx    #Reactive airway disease - Patient requiring intermittent BiPAP for acute SOB, acute wheezing, and respiratory distress. Patient receives solumedrol, duonebs, placed on BiPAP, and ativan 0.25mg as seems to be related to anxiety. HAP with b/l pleural effusions also likely contributing. Possibly undiagnosed asthma exacerbation  CXR chest demonstrates right upper lobe regions of patchy consolidation and interstitial pulmonary edema throughout the bilateral upper lobes and bilateral lower lobes. Low lung volumes. Normal heart size. Small layering persistent bilateral pleural effusions. Reversed right shoulder arthroplasty.  -Patient improving toady, tolerated NC @5-6L O2 well today  -c/w duonebs q4hrs  -c/w IV solumedrol 40mg TID   -c/w pulmicort 0.5mg BID  -c/w BiPAP PRN  -c/w azithromycin MWF

## 2018-08-21 NOTE — DIETITIAN INITIAL EVALUATION ADULT. - PROBLEM SELECTOR PLAN 1
- patient met 4/4 SIRS criteria - T 102, RR 25, , WBC 10.8  - CT PE negative for PE but showed possible RUL consolidation. RVP negative.  - vanc/zosyn  - VBG(pH7.41, pCO2 35, pO2 58 HCO3 22)  - echo to rule out structural heart disease  - f/u vanc trough  - standing duonebs  - wean bipap and monitor respiratory status  - legionella urine antigen and pneumococcal urine antigen  - consider lung u/s before d/c ing BIPAP since patient had crackles in all lung fields

## 2018-08-21 NOTE — PROGRESS NOTE ADULT - ASSESSMENT
72F [facility] Parkinson's Disease, R. shoulder surgery (7da at [facility]) who was at rehab facility and experience acute onset shortness of breath 2/2 HAP PNA.

## 2018-08-21 NOTE — PROGRESS NOTE ADULT - SUBJECTIVE AND OBJECTIVE BOX
OVERNIGHT EVENTS:    SUBJECTIVE / INTERVAL HPI: Patient seen and examined at bedside.     VITAL SIGNS:  Vital Signs Last 24 Hrs  T(C): 36.4 (21 Aug 2018 06:00), Max: 37.4 (20 Aug 2018 09:51)  T(F): 97.6 (21 Aug 2018 06:00), Max: 99.3 (20 Aug 2018 09:51)  HR: 76 (21 Aug 2018 04:55) (72 - 99)  BP: 100/59 (21 Aug 2018 04:55) (100/59 - 121/64)  BP(mean): 75 (21 Aug 2018 04:55) (75 - 86)  RR: 17 (21 Aug 2018 04:55) (17 - 20)  SpO2: 100% (21 Aug 2018 04:55) (93% - 100%)    PHYSICAL EXAM:    General: Patient appears uncomfortable, grimacing. Tacypneic, although sating well on BiPAP. Resting tremor appreciated  HEENT: NC/AT; PERRL, anicteric sclera; MMM  Neck: supple  Cardiovascular: +S1/S2, RRR  Respiratory: b/l crackles and wheezing appreciated throughout lung fields. Wheezing appreciated b/l in multiple lung fields. No rhonchi appreciated  Gastrointestinal: soft, NT/ND; +BSx4  Extremities: WWP; no edema, clubbing or cyanosis  Vascular: 2+ radial, DP/PT pulses B/L  Neurological: AAOx3; no focal deficits    MEDICATIONS:  MEDICATIONS  (STANDING):  ALBUTerol/ipratropium for Nebulization 3 milliLiter(s) Nebulizer every 4 hours  atorvastatin 10 milliGRAM(s) Oral at bedtime  azithromycin  IVPB      azithromycin  IVPB 500 milliGRAM(s) IV Intermittent every 24 hours  buDESOnide   0.5 milliGRAM(s) Respule 0.5 milliGRAM(s) Inhalation every 12 hours  carbidopa/levodopa  25/100 1.5 Tablet(s) Oral <User Schedule>  carbidopa/levodopa CR 25/100 1 Tablet(s) Oral <User Schedule>  cholecalciferol 1000 Unit(s) Oral daily  cyanocobalamin 1000 MICROGram(s) Oral daily  enalapril 2.5 milliGRAM(s) Oral daily  furosemide    Tablet 20 milliGRAM(s) Oral daily  methylPREDNISolone sodium succinate Injectable 40 milliGRAM(s) IV Push every 8 hours  piperacillin/tazobactam IVPB. 3.375 Gram(s) IV Intermittent every 6 hours  primidone 50 milliGRAM(s) Oral <User Schedule>  QUEtiapine 25 milliGRAM(s) Oral <User Schedule>  selegiline Oral Tab/Cap 5 milliGRAM(s) Oral <User Schedule>  sertraline 100 milliGRAM(s) Oral <User Schedule>  vancomycin  IVPB 1000 milliGRAM(s) IV Intermittent every 12 hours    MEDICATIONS  (PRN):  LORazepam   Injectable 0.5 milliGRAM(s) IV Push every 6 hours PRN Anxiety      ALLERGIES:  Allergies    No Known Allergies    Intolerances        LABS:                        7.8    11.2  )-----------( 394      ( 20 Aug 2018 06:30 )             25.1     08-20    140  |  100  |  21  ----------------------------<  122<H>  3.5   |  26  |  0.86    Ca    8.4      20 Aug 2018 06:30  Mg     2.2     08-20          CAPILLARY BLOOD GLUCOSE          RADIOLOGY & ADDITIONAL TESTS: Reviewed. OVERNIGHT EVENTS:    SUBJECTIVE / INTERVAL HPI: Patient seen and examined at bedside.     VITAL SIGNS:  Vital Signs Last 24 Hrs  T(C): 36.4 (21 Aug 2018 06:00), Max: 37.4 (20 Aug 2018 09:51)  T(F): 97.6 (21 Aug 2018 06:00), Max: 99.3 (20 Aug 2018 09:51)  HR: 76 (21 Aug 2018 04:55) (72 - 99)  BP: 100/59 (21 Aug 2018 04:55) (100/59 - 121/64)  BP(mean): 75 (21 Aug 2018 04:55) (75 - 86)  RR: 17 (21 Aug 2018 04:55) (17 - 20)  SpO2: 100% (21 Aug 2018 04:55) (93% - 100%)    PHYSICAL EXAM:    General: Patent comfortable in bed. NAD  HEENT: NC/AT; PERRL, anicteric sclera; MMM  Neck: supple  Cardiovascular: +S1/S2, RRR  Respiratory: b/l crackles and wheezing appreciated throughout lung fields. Wheezing appreciated b/l in multiple lung fields. No rhonchi appreciated  Gastrointestinal: soft, NT/ND; +BSx4  Extremities: WWP; no edema, clubbing or cyanosis  Vascular: 2+ radial, DP/PT pulses B/L  Neurological: AAOx3; no focal deficits    MEDICATIONS:  MEDICATIONS  (STANDING):  ALBUTerol/ipratropium for Nebulization 3 milliLiter(s) Nebulizer every 4 hours  atorvastatin 10 milliGRAM(s) Oral at bedtime  azithromycin  IVPB      azithromycin  IVPB 500 milliGRAM(s) IV Intermittent every 24 hours  buDESOnide   0.5 milliGRAM(s) Respule 0.5 milliGRAM(s) Inhalation every 12 hours  carbidopa/levodopa  25/100 1.5 Tablet(s) Oral <User Schedule>  carbidopa/levodopa CR 25/100 1 Tablet(s) Oral <User Schedule>  cholecalciferol 1000 Unit(s) Oral daily  cyanocobalamin 1000 MICROGram(s) Oral daily  enalapril 2.5 milliGRAM(s) Oral daily  furosemide    Tablet 20 milliGRAM(s) Oral daily  methylPREDNISolone sodium succinate Injectable 40 milliGRAM(s) IV Push every 8 hours  piperacillin/tazobactam IVPB. 3.375 Gram(s) IV Intermittent every 6 hours  primidone 50 milliGRAM(s) Oral <User Schedule>  QUEtiapine 25 milliGRAM(s) Oral <User Schedule>  selegiline Oral Tab/Cap 5 milliGRAM(s) Oral <User Schedule>  sertraline 100 milliGRAM(s) Oral <User Schedule>  vancomycin  IVPB 1000 milliGRAM(s) IV Intermittent every 12 hours    MEDICATIONS  (PRN):  LORazepam   Injectable 0.5 milliGRAM(s) IV Push every 6 hours PRN Anxiety      ALLERGIES:  Allergies    No Known Allergies    Intolerances        LABS:                        7.8    11.2  )-----------( 394      ( 20 Aug 2018 06:30 )             25.1     08-20    140  |  100  |  21  ----------------------------<  122<H>  3.5   |  26  |  0.86    Ca    8.4      20 Aug 2018 06:30  Mg     2.2     08-20          CAPILLARY BLOOD GLUCOSE          RADIOLOGY & ADDITIONAL TESTS: Reviewed.    < from: Xray Chest 1 View- PORTABLE-Routine (08.21.18 @ 07:32) >  IMPRESSION: Frontal view of the chest is compared to 8/20/2018 and   demonstrates slight interval improvement with now moderate residual   colonic interstitial pneumonitis versus advanced interstitial pulmonary   edema. Normal heart size. Unchanged small layering bilateral pleural   effusions. Bibasilar atelectasis. Low lung volumes. Reversed right   shoulder arthroplasty.

## 2018-08-21 NOTE — CONSULT NOTE ADULT - SUBJECTIVE AND OBJECTIVE BOX
Surgeon:    Requesting Physician:    HISTORY OF PRESENT ILLNESS:  **Pt not seen yet**  73y Female    PAST MEDICAL & SURGICAL HISTORY:  Hypercholesteremia  Parkinsons  History of right shoulder replacement: x2      MEDICATIONS  (STANDING):  ALBUTerol/ipratropium for Nebulization 3 milliLiter(s) Nebulizer every 4 hours  aspirin enteric coated 81 milliGRAM(s) Oral daily  atorvastatin 10 milliGRAM(s) Oral at bedtime  azithromycin   Tablet 500 milliGRAM(s) Oral <User Schedule>  buDESOnide   0.5 milliGRAM(s) Respule 0.5 milliGRAM(s) Inhalation every 12 hours  carbidopa/levodopa  25/100 1.5 Tablet(s) Oral <User Schedule>  carbidopa/levodopa CR 25/100 1 Tablet(s) Oral <User Schedule>  cholecalciferol 1000 Unit(s) Oral daily  cyanocobalamin 1000 MICROGram(s) Oral daily  enalapril 2.5 milliGRAM(s) Oral daily  furosemide   Injectable 20 milliGRAM(s) IV Push every 12 hours  heparin  Injectable 5000 Unit(s) SubCutaneous every 8 hours  methylPREDNISolone sodium succinate Injectable 40 milliGRAM(s) IV Push every 8 hours  piperacillin/tazobactam IVPB. 3.375 Gram(s) IV Intermittent every 6 hours  primidone 50 milliGRAM(s) Oral <User Schedule>  QUEtiapine 25 milliGRAM(s) Oral <User Schedule>  selegiline Oral Tab/Cap 5 milliGRAM(s) Oral <User Schedule>  sertraline 100 milliGRAM(s) Oral <User Schedule>  vancomycin  IVPB 1000 milliGRAM(s) IV Intermittent every 12 hours    MEDICATIONS  (PRN):  LORazepam   Injectable 0.5 milliGRAM(s) IV Push every 6 hours PRN Anxiety      Allergies    No Known Allergies    Intolerances        SOCIAL HISTORY:  Smoker:  YES / NO        PACK YEARS:                         WHEN QUIT?  ETOH use:  YES / NO               FREQUENCY / QUANTITY:  Ilicit Drug use:  YES / NO  Occupation:  Assisted device use (Cane / Walker):  Live with:    FAMILY HISTORY:      Review of Systems  CONSTITUTIONAL:  Fevers / chills, sweats, fatigue, weight loss, weight gain                                    NEGATIVE  NEURO:  parathesias, seizures, syncope, confusion                                                                               NEGATIVE  EYES:  Blurry vision, discharge, pain, loss of vision                                                                                  NEGATIVE  ENMT:  Difficulty hearing, vertigo, dysphagia, epistaxis, recent dental work                                     NEGATIVE  CV:  Chest pain, palpitations, HANSON, orthopnea                                                                                         NEGATIVE  RESPIRATORY:  Wheezing, SOB, cough / sputum, hemoptysis                                                              NEGATIVE  GI:  Nausea, vommiting, diarrhea, constipation, melena                                                                        NEGATIVE  : Hematuria, dysuria, urgency, incontinence                                                                                       NEGATIVE  MUSKULOSKELETAL:  arthritis, joint swelling, muscle weakness                                                           NEGATIVE  SKIN/BREAST:  rash, itching, issa loss, masses                                                                                            NEGATIVE  PSYCH:  depresion, anxiety, suicidal ideation                                                                                             NEGATIVE  HEME/LYMPH:  bruises easily, enlarged lymph nodes, tender lymph nodes                                        NEGATIVE  ENDOCRINE:  cold intolerance, heat intolerance, polydipsia                                                                   NEGATIVE    PHYSICAL EXAM  Vital Signs Last 24 Hrs  T(C): 36.7 (21 Aug 2018 13:33), Max: 37 (20 Aug 2018 17:03)  T(F): 98.1 (21 Aug 2018 13:33), Max: 98.6 (20 Aug 2018 17:03)  HR: 90 (21 Aug 2018 16:00) (70 - 99)  BP: 98/57 (21 Aug 2018 16:00) (98/57 - 117/58)  BP(mean): 72 (21 Aug 2018 16:00) (72 - 83)  RR: 20 (21 Aug 2018 16:00) (17 - 20)  SpO2: 94% (21 Aug 2018 16:00) (88% - 100%)    GEN: NAD, looks comfortable  Psych: Mood appropriate  Neuro: A&Ox3.  No focal deficits.  Moving all extremities.   HEENT: No obvious abnormalities  CV: S1S2, regular, no murmurs appreciated.  No carotid bruits.  No JVD  Lungs: Clear B/L.  No wheezing, rales or rhonchi  ABD: Soft, non-tender, non-distended.  +Bowel sounds  EXT: Warm and well perfused.  No peripheral edema noted  Musculoskeletal: Moving all extremities with normal ROM, no joint swelling  PV: Pedal pulses palpable                                                            LABS:                        7.7    7.4   )-----------( 425      ( 21 Aug 2018 06:47 )             25.6     08-21    139  |  98  |  21  ----------------------------<  144<H>  3.3<L>   |  27  |  0.75    Ca    8.6      21 Aug 2018 06:47  Phos  5.1     08-21  Mg     2.2     08-21          CARDIAC MARKERS ( 19 Aug 2018 18:24 )  x     / <0.01 ng/mL / 323 U/L / x     / 5.4 ng/mL          RADIOLOGY & ADDITIONAL STUDIES:  CAROTID U/S:    CXR:    CT Scan:    EKG:    TTE / ELROY:< from: Echocardiogram (08.20.18 @ 11:40) >  The left atrium is dilated. Right atrial size isnormal.There is mild   aortic   valve thickening. The aortic valve is trileaflet. No aortic regurgitation   noted. No hemodynamically significant valvular aortic stenosis.  There   is mild   mitral valve thickening. There is mild mitral annular calcification.   There is   moderate to severe mitral regurgitation.  Structurally normal tricuspid   valve.   There is mild tricuspid regurgitation.The pulmonary artery systolic   pressure   is estimated to be 50 mmHg.This study shows evidence of pulmonary   hypertension.The pulmonic valve is not well visualized. There is trace   pulmonic regurgitation.  The right ventricle is dilated.The right   ventricular   systolic function is normal.There is mild concentric left ventricular   hypertrophy. The left ventricular wall motion is normal. The left   ventricular   ejection fraction is 65%.  No aortic root dilatation.There is no   pericardial   effusion.No prior study for comparison.    < end of copied text >      Cardiac Cath: Surgeon: Dr. Delgado     Requesting Physician: Dr. Smith    HISTORY OF PRESENT ILLNESS:   This is a 72 year old female with family history of premature cardiac death in both parents (54 in mother from malignant HTN, and 50 in father from MI) but with no family history of valvular disease and medical history of Parkinson's disease, recent right shoulder surgery at [facility] on 18, and rheumatic fever as a child who initially presented to [facility] ED with sudden onset acute SOB, cough, and wheeze.  In ED, she was noted to be tachypneic and hypoxic with Tmax 102.9F, HR 115bpm, RR 44, and with leukocytosis.  She was started on empiric Vancomycin/Zosyn and given Solumedrol for SIRS and admitted to ICU for medical management of acute hypoxic respiratory failure.  Patient reports progressive SOB at rest and HANSON over the last year with intermittent non-productive cough and wheeze without LE edema or orthopnea.  During her hospital course, she underwent ECHO on 18 demonstrated dilated LA, mild MV thickening with moderate to severe MR, mild TR, PASP 50mmHg, dilated RV, and EF 65%.  Dr. Delgado, Structural Heart, was consulted for evaluation of moderate to severe MR for possible procedural intervention.  On evaluation, pt continues to endorse profound SOB at rest which is improved somewhat with 6L NC and has improved dramatically since her admission but otherwise denies HA, AMS, CP, palpitations, orthopnea, PND, n/v/d, fevers, chills.     PAST MEDICAL & SURGICAL HISTORY:  Rheumatic Fever  Hypercholesteremia  Parkinsons  History of right shoulder replacement: x2    MEDICATIONS  (STANDING):  ALBUTerol/ipratropium for Nebulization 3 milliLiter(s) Nebulizer every 4 hours  aspirin enteric coated 81 milliGRAM(s) Oral daily  atorvastatin 10 milliGRAM(s) Oral at bedtime  azithromycin   Tablet 500 milliGRAM(s) Oral <User Schedule>  buDESOnide   0.5 milliGRAM(s) Respule 0.5 milliGRAM(s) Inhalation every 12 hours  carbidopa/levodopa  25/100 1.5 Tablet(s) Oral <User Schedule>  carbidopa/levodopa CR 25/100 1 Tablet(s) Oral <User Schedule>  cholecalciferol 1000 Unit(s) Oral daily  cyanocobalamin 1000 MICROGram(s) Oral daily  enalapril 2.5 milliGRAM(s) Oral daily  furosemide   Injectable 20 milliGRAM(s) IV Push every 12 hours  heparin  Injectable 5000 Unit(s) SubCutaneous every 8 hours  methylPREDNISolone sodium succinate Injectable 40 milliGRAM(s) IV Push every 8 hours  piperacillin/tazobactam IVPB. 3.375 Gram(s) IV Intermittent every 6 hours  primidone 50 milliGRAM(s) Oral <User Schedule>  QUEtiapine 25 milliGRAM(s) Oral <User Schedule>  selegiline Oral Tab/Cap 5 milliGRAM(s) Oral <User Schedule>  sertraline 100 milliGRAM(s) Oral <User Schedule>  vancomycin  IVPB 1000 milliGRAM(s) IV Intermittent every 12 hours    MEDICATIONS  (PRN):  LORazepam   Injectable 0.5 milliGRAM(s) IV Push every 6 hours PRN Anxiety    Allergies    No Known Allergies    Intolerances    SOCIAL HISTORY:  Smoker:  NO    ETOH use:  NO        lllicit Drug use: NO  Occupation: Retired   Assisted device use (Cane / Walker): Cane   Live with: Alone; with home health aid     FAMILY HISTORY:  MI- Father,  at 50  HTN- Mother,  at 54    Review of Systems  CONSTITUTIONAL: +Fatigue; Denies Fevers / chills, sweats, weight loss, weight gain                                      NEURO:  Denies paresthesia, seizures, syncope, confusion                                                                                EYES:  Denies Blurry vision, discharge, pain, loss of vision                                                                                    ENMT:  Denies Difficulty hearing, vertigo, dysphagia, epistaxis, recent dental work                                       CV:  +HANSON; Denies Chest pain, palpitations, orthopnea                                                                                          RESPIRATORY:  +SOB, wheeze, cough; Denies sputum, hemoptysis                                                                GI:  Denies Nausea, vomiting, diarrhea, constipation, melena, difficulty swallowing                                               : Denies Hematuria, dysuria, urgency, incontinence                                                                                         MUSCULOSKELETAL:  Denies arthritis, joint swelling, muscle weakness                                                             SKIN/BREAST:  Denies rash, itching, hair loss, masses                                                                                            PSYCH:  Denies depression, anxiety, suicidal ideation                                                                                               HEME/LYMPH:  Denies bruises easily, enlarged lymph nodes, tender lymph nodes                                        ENDOCRINE:  Denies cold intolerance, heat intolerance, polydipsia                                                                    PHYSICAL EXAM  Vital Signs Last 24 Hrs  T(C): 36.7 (21 Aug 2018 13:33), Max: 37 (20 Aug 2018 17:03)  T(F): 98.1 (21 Aug 2018 13:33), Max: 98.6 (20 Aug 2018 17:03)  HR: 90 (21 Aug 2018 16:00) (70 - 99)  BP: 98/57 (21 Aug 2018 16:00) (98/57 - 117/58)  BP(mean): 72 (21 Aug 2018 16:00) (72 - 83)  RR: 20 (21 Aug 2018 16:00) (17 - 20)  SpO2: 94% (21 Aug 2018 16:00) (88% - 100%)    Appearance: No acute distress.  Neurologic: AAOx3, no AMS or focal deficits.  Responds appropriately to verbal and physical stimuli; exhibits purposeful movement in all extremities.  HEENT:   MMM, PERRLA, EOMIb/l  Neck: Supple,  - JVD; - Carotid Bruit   Cardiovascular: RRR, S1 S2. No m/r/g.  Respiratory: Appears SOB on 6L NC, saturating 90%. Scattered fine rales throughout b/l lung fields.  No wheeze or rhonchi.   Gastrointestinal:  Soft, non-tender, non-distended, + BS.	  Skin: No rashes. No ecchymoses. No cyanosis.  Extremities: Exhibits normal range of motion, no clubbing, cyanosis or edema.  Vascular: Peripheral pulses palpable 2+ bilaterally.                                                          LABS:                        7.7    7.4   )-----------( 425      ( 21 Aug 2018 06:47 )             25.6     08-    139  |  98  |  21  ----------------------------<  144<H>  3.3<L>   |  27  |  0.75    Ca    8.6      21 Aug 2018 06:47  Phos  5.1       Mg     2.2         CARDIAC MARKERS ( 19 Aug 2018 18:24 )  x     / <0.01 ng/mL / 323 U/L / x     / 5.4 ng/mL    RADIOLOGY & ADDITIONAL STUDIES:  CAROTID U/S: pending     CXR:  < from: Xray Chest 1 View- PORTABLE-Routine (18 @ 07:32) >    EXAM:  XR CHEST PORTABLE ROUTINE 1V                          PROCEDURE DATE:  2018          INTERPRETATION:  CLINICAL INDICATION: 73-year-old with sepsis.      IMPRESSION: Frontal view of the chest is compared to 2018 and   demonstrates slight interval improvement with now moderate residual   colonic interstitial pneumonitis versus advanced interstitial pulmonary   edema. Normal heart size. Unchanged small layering bilateral pleural   effusions. Bibasilar atelectasis. Low lung volumes. Reversed right   shoulder arthroplasty.      < end of copied text >    CT Scan:  < from: CT Angio Chest PE Protocol w/ IV Cont (18 @ 19:44) >  IMPRESSION:   1.  No definite pulmonary embolism. Limited assessment of segmental and   subsegmental branches as above.   2.  Consolidation within the right upper lobe. Likely an infectious   pneumonia.  3.  Findings compatible with tracheomalacia.  4.  Right glenohumeral arthroplasty with regional soft tissue swelling.    < end of copied text >      EKG:    TTE / ELROY:< from: Echocardiogram (18 @ 11:40) >  The left atrium is dilated. Right atrial size isnormal.There is mild   aortic   valve thickening. The aortic valve is trileaflet. No aortic regurgitation   noted. No hemodynamically significant valvular aortic stenosis.  There   is mild   mitral valve thickening. There is mild mitral annular calcification.   There is   moderate to severe mitral regurgitation.  Structurally normal tricuspid   valve.   There is mild tricuspid regurgitation.The pulmonary artery systolic   pressure   is estimated to be 50 mmHg.This study shows evidence of pulmonary   hypertension.The pulmonic valve is not well visualized. There is trace   pulmonic regurgitation.  The right ventricle is dilated.The right   ventricular   systolic function is normal.There is mild concentric left ventricular   hypertrophy. The left ventricular wall motion is normal. The left   ventricular   ejection fraction is 65%.  No aortic root dilatation.There is no   pericardial   effusion.No prior study for comparison.    < end of copied text >

## 2018-08-21 NOTE — PROGRESS NOTE ADULT - PROBLEM SELECTOR PLAN 2
Resolved. Patient met 4/4 SIRS criteria - T 102, RR 25, , WBC 10.8 on admission. Lactate 1.3. RVP negative.  CT PE negative for PE but showed possible RUL consolidation.  - legionella urine antigen negative. Sepsis due to hospital acquired PNA.   -c/w vanc/zosyn   -pneumococcal urine antigen  -afebrile throughout day  - continue to monitor WBCs

## 2018-08-22 DIAGNOSIS — J45.909 UNSPECIFIED ASTHMA, UNCOMPLICATED: ICD-10-CM

## 2018-08-22 DIAGNOSIS — I27.20 PULMONARY HYPERTENSION, UNSPECIFIED: ICD-10-CM

## 2018-08-22 LAB
ANION GAP SERPL CALC-SCNC: 14 MMOL/L — SIGNIFICANT CHANGE UP (ref 5–17)
ANION GAP SERPL CALC-SCNC: 18 MMOL/L — HIGH (ref 5–17)
BUN SERPL-MCNC: 20 MG/DL — SIGNIFICANT CHANGE UP (ref 7–23)
BUN SERPL-MCNC: 22 MG/DL — SIGNIFICANT CHANGE UP (ref 7–23)
CALCIUM SERPL-MCNC: 8.8 MG/DL — SIGNIFICANT CHANGE UP (ref 8.4–10.5)
CALCIUM SERPL-MCNC: 9.3 MG/DL — SIGNIFICANT CHANGE UP (ref 8.4–10.5)
CHLORIDE SERPL-SCNC: 100 MMOL/L — SIGNIFICANT CHANGE UP (ref 96–108)
CHLORIDE SERPL-SCNC: 100 MMOL/L — SIGNIFICANT CHANGE UP (ref 96–108)
CO2 SERPL-SCNC: 24 MMOL/L — SIGNIFICANT CHANGE UP (ref 22–31)
CO2 SERPL-SCNC: 25 MMOL/L — SIGNIFICANT CHANGE UP (ref 22–31)
CREAT SERPL-MCNC: 0.78 MG/DL — SIGNIFICANT CHANGE UP (ref 0.5–1.3)
CREAT SERPL-MCNC: 0.84 MG/DL — SIGNIFICANT CHANGE UP (ref 0.5–1.3)
CULTURE RESULTS: SIGNIFICANT CHANGE UP
GLUCOSE SERPL-MCNC: 156 MG/DL — HIGH (ref 70–99)
GLUCOSE SERPL-MCNC: 157 MG/DL — HIGH (ref 70–99)
HCT VFR BLD CALC: 25.2 % — LOW (ref 34.5–45)
HGB BLD-MCNC: 7.8 G/DL — LOW (ref 11.5–15.5)
MAGNESIUM SERPL-MCNC: 2.2 MG/DL — SIGNIFICANT CHANGE UP (ref 1.6–2.6)
MCHC RBC-ENTMCNC: 29.1 PG — SIGNIFICANT CHANGE UP (ref 27–34)
MCHC RBC-ENTMCNC: 31 G/DL — LOW (ref 32–36)
MCV RBC AUTO: 94 FL — SIGNIFICANT CHANGE UP (ref 80–100)
PHOSPHATE SERPL-MCNC: 3.4 MG/DL — SIGNIFICANT CHANGE UP (ref 2.5–4.5)
PLATELET # BLD AUTO: 450 K/UL — HIGH (ref 150–400)
POTASSIUM SERPL-MCNC: 3.5 MMOL/L — SIGNIFICANT CHANGE UP (ref 3.5–5.3)
POTASSIUM SERPL-MCNC: 3.9 MMOL/L — SIGNIFICANT CHANGE UP (ref 3.5–5.3)
POTASSIUM SERPL-SCNC: 3.5 MMOL/L — SIGNIFICANT CHANGE UP (ref 3.5–5.3)
POTASSIUM SERPL-SCNC: 3.9 MMOL/L — SIGNIFICANT CHANGE UP (ref 3.5–5.3)
RBC # BLD: 2.68 M/UL — LOW (ref 3.8–5.2)
RBC # FLD: 14.9 % — SIGNIFICANT CHANGE UP (ref 10.3–16.9)
SODIUM SERPL-SCNC: 139 MMOL/L — SIGNIFICANT CHANGE UP (ref 135–145)
SODIUM SERPL-SCNC: 142 MMOL/L — SIGNIFICANT CHANGE UP (ref 135–145)
SPECIMEN SOURCE: SIGNIFICANT CHANGE UP
VANCOMYCIN TROUGH SERPL-MCNC: 26.5 UG/ML — CRITICAL HIGH (ref 10–20)
WBC # BLD: 11.8 K/UL — HIGH (ref 3.8–10.5)
WBC # FLD AUTO: 11.8 K/UL — HIGH (ref 3.8–10.5)

## 2018-08-22 PROCEDURE — 99233 SBSQ HOSP IP/OBS HIGH 50: CPT | Mod: GC

## 2018-08-22 PROCEDURE — 73030 X-RAY EXAM OF SHOULDER: CPT | Mod: 26,RT

## 2018-08-22 RX ORDER — LIDOCAINE 4 G/100G
1 CREAM TOPICAL DAILY
Qty: 0 | Refills: 0 | Status: DISCONTINUED | OUTPATIENT
Start: 2018-08-22 | End: 2018-08-30

## 2018-08-22 RX ORDER — VANCOMYCIN HCL 1 G
750 VIAL (EA) INTRAVENOUS EVERY 12 HOURS
Qty: 0 | Refills: 0 | Status: DISCONTINUED | OUTPATIENT
Start: 2018-08-22 | End: 2018-08-22

## 2018-08-22 RX ORDER — POTASSIUM CHLORIDE 20 MEQ
40 PACKET (EA) ORAL ONCE
Qty: 0 | Refills: 0 | Status: COMPLETED | OUTPATIENT
Start: 2018-08-22 | End: 2018-08-22

## 2018-08-22 RX ORDER — POTASSIUM CHLORIDE 20 MEQ
20 PACKET (EA) ORAL ONCE
Qty: 0 | Refills: 0 | Status: COMPLETED | OUTPATIENT
Start: 2018-08-22 | End: 2018-08-22

## 2018-08-22 RX ORDER — IPRATROPIUM/ALBUTEROL SULFATE 18-103MCG
3 AEROSOL WITH ADAPTER (GRAM) INHALATION ONCE
Qty: 0 | Refills: 0 | Status: COMPLETED | OUTPATIENT
Start: 2018-08-22 | End: 2018-08-22

## 2018-08-22 RX ADMIN — HEPARIN SODIUM 5000 UNIT(S): 5000 INJECTION INTRAVENOUS; SUBCUTANEOUS at 21:57

## 2018-08-22 RX ADMIN — HEPARIN SODIUM 5000 UNIT(S): 5000 INJECTION INTRAVENOUS; SUBCUTANEOUS at 14:08

## 2018-08-22 RX ADMIN — Medication 20 MILLIGRAM(S): at 17:39

## 2018-08-22 RX ADMIN — LIDOCAINE 1 PATCH: 4 CREAM TOPICAL at 11:57

## 2018-08-22 RX ADMIN — PRIMIDONE 50 MILLIGRAM(S): 250 TABLET ORAL at 07:56

## 2018-08-22 RX ADMIN — CARBIDOPA AND LEVODOPA 1.5 TABLET(S): 25; 100 TABLET ORAL at 16:31

## 2018-08-22 RX ADMIN — HEPARIN SODIUM 5000 UNIT(S): 5000 INJECTION INTRAVENOUS; SUBCUTANEOUS at 00:36

## 2018-08-22 RX ADMIN — HEPARIN SODIUM 5000 UNIT(S): 5000 INJECTION INTRAVENOUS; SUBCUTANEOUS at 06:45

## 2018-08-22 RX ADMIN — CARBIDOPA AND LEVODOPA 1 TABLET(S): 25; 100 TABLET ORAL at 21:58

## 2018-08-22 RX ADMIN — Medication 3 MILLILITER(S): at 08:55

## 2018-08-22 RX ADMIN — Medication 20 MILLIEQUIVALENT(S): at 21:57

## 2018-08-22 RX ADMIN — CARBIDOPA AND LEVODOPA 1.5 TABLET(S): 25; 100 TABLET ORAL at 07:48

## 2018-08-22 RX ADMIN — PIPERACILLIN AND TAZOBACTAM 200 GRAM(S): 4; .5 INJECTION, POWDER, LYOPHILIZED, FOR SOLUTION INTRAVENOUS at 00:36

## 2018-08-22 RX ADMIN — LIDOCAINE 1 PATCH: 4 CREAM TOPICAL at 23:55

## 2018-08-22 RX ADMIN — PREGABALIN 1000 MICROGRAM(S): 225 CAPSULE ORAL at 11:57

## 2018-08-22 RX ADMIN — Medication 3 MILLILITER(S): at 05:45

## 2018-08-22 RX ADMIN — Medication 3 MILLILITER(S): at 21:57

## 2018-08-22 RX ADMIN — Medication 3 MILLILITER(S): at 14:08

## 2018-08-22 RX ADMIN — Medication 0.5 MILLIGRAM(S): at 21:57

## 2018-08-22 RX ADMIN — QUETIAPINE FUMARATE 25 MILLIGRAM(S): 200 TABLET, FILM COATED ORAL at 21:57

## 2018-08-22 RX ADMIN — PIPERACILLIN AND TAZOBACTAM 200 GRAM(S): 4; .5 INJECTION, POWDER, LYOPHILIZED, FOR SOLUTION INTRAVENOUS at 11:57

## 2018-08-22 RX ADMIN — SELEGILINE HYDROCHLORIDE 5 MILLIGRAM(S): 1.25 TABLET, ORALLY DISINTEGRATING ORAL at 07:56

## 2018-08-22 RX ADMIN — Medication 81 MILLIGRAM(S): at 11:57

## 2018-08-22 RX ADMIN — ATORVASTATIN CALCIUM 10 MILLIGRAM(S): 80 TABLET, FILM COATED ORAL at 21:57

## 2018-08-22 RX ADMIN — PIPERACILLIN AND TAZOBACTAM 200 GRAM(S): 4; .5 INJECTION, POWDER, LYOPHILIZED, FOR SOLUTION INTRAVENOUS at 17:38

## 2018-08-22 RX ADMIN — Medication 20 MILLIGRAM(S): at 06:45

## 2018-08-22 RX ADMIN — Medication 0.5 MILLIGRAM(S): at 08:54

## 2018-08-22 RX ADMIN — Medication 2.5 MILLIGRAM(S): at 06:45

## 2018-08-22 RX ADMIN — Medication 40 MILLIGRAM(S): at 00:35

## 2018-08-22 RX ADMIN — Medication 40 MILLIGRAM(S): at 17:39

## 2018-08-22 RX ADMIN — Medication 40 MILLIGRAM(S): at 10:38

## 2018-08-22 RX ADMIN — PIPERACILLIN AND TAZOBACTAM 200 GRAM(S): 4; .5 INJECTION, POWDER, LYOPHILIZED, FOR SOLUTION INTRAVENOUS at 06:45

## 2018-08-22 RX ADMIN — Medication 1000 UNIT(S): at 11:57

## 2018-08-22 RX ADMIN — Medication 5 MILLIGRAM(S): at 12:09

## 2018-08-22 RX ADMIN — AZITHROMYCIN 500 MILLIGRAM(S): 500 TABLET, FILM COATED ORAL at 10:38

## 2018-08-22 RX ADMIN — Medication 3 MILLILITER(S): at 00:36

## 2018-08-22 RX ADMIN — Medication 40 MILLIEQUIVALENT(S): at 18:32

## 2018-08-22 RX ADMIN — CARBIDOPA AND LEVODOPA 1.5 TABLET(S): 25; 100 TABLET ORAL at 12:09

## 2018-08-22 RX ADMIN — Medication 3 MILLILITER(S): at 17:39

## 2018-08-22 RX ADMIN — SERTRALINE 100 MILLIGRAM(S): 25 TABLET, FILM COATED ORAL at 07:56

## 2018-08-22 NOTE — PROGRESS NOTE ADULT - PROBLEM SELECTOR PLAN 3
- c/w home meds. Echo 8/20 demonstrated EF 65%, dilated left atrium, moderate to severe MR, pulmonary artery systolic pressure 50mmhg.  -Increased PO enalapril to 5 mg daily.   -c/w IV lasix 20mg BID

## 2018-08-22 NOTE — PHYSICAL THERAPY INITIAL EVALUATION ADULT - PASSIVE RANGE OF MOTION EXAMINATION, REHAB EVAL
RUE not tested/bilateral upper extremity Passive ROM was WFL (within functional limits)/bilateral lower extremity Passive ROM was WFL (within functional limits)

## 2018-08-22 NOTE — PHYSICAL THERAPY INITIAL EVALUATION ADULT - PERTINENT HX OF CURRENT PROBLEM, REHAB EVAL
72F [facility] Parkinson's Disease, R. shoulder surgery (7da at [facility]) who was at rehab facility and experience acute onset shortness of breath 2/2 HAP PNA.

## 2018-08-22 NOTE — PROGRESS NOTE ADULT - ASSESSMENT
72F [facility] Parkinson's Disease, R. shoulder surgery (7da at [facility]) who was at rehab facility and experience acute onset shortness of breath 2/2 HAP PNA.

## 2018-08-22 NOTE — PHYSICAL THERAPY INITIAL EVALUATION ADULT - CRITERIA FOR SKILLED THERAPEUTIC INTERVENTIONS
functional limitations in following categories/impairments found/risk reduction/prevention/rehab potential/predicted duration of therapy intervention

## 2018-08-22 NOTE — PROGRESS NOTE ADULT - PROBLEM SELECTOR PLAN 6
1) PCP Contacted on Admission: (Y/N) --> Name & Phone #:  2) Date of Contact with PCP:  3) PCP Contacted at Discharge: (Y/N)  4) Summary of Handoff Given to PCP:   5) Post-Discharge Appointment Date and Location:    Case and plan discussed with primary team c/w liptior

## 2018-08-22 NOTE — PHYSICAL THERAPY INITIAL EVALUATION ADULT - ADDITIONAL COMMENTS
Pt admitted from [facility] rehab following R GH arthroplasty, now POD #7. Subjective obtained from pt and HHA/friend bedside, stating at baseline pt lives alone in apartment without stairs. Has 24 hour superivision/assist in combination with HHA, friends, and family members. Owns cane, showerchair, raised toilet seat, grab bars. H/o multiple falls in/outside. Baseline amb tolerance ~7 blocks with cane and superivsion. Pt states she has not walked in ~2 weeks, only amb 1 day in the week spent at rehab.

## 2018-08-22 NOTE — PHYSICAL THERAPY INITIAL EVALUATION ADULT - ACTIVE RANGE OF MOTION EXAMINATION, REHAB EVAL
bilateral  lower extremity Active ROM was WFL (within functional limits)/except R shoulder abd 0-30/bilateral upper extremity Active ROM was WFL (within functional limits)

## 2018-08-22 NOTE — PROGRESS NOTE ADULT - PROBLEM SELECTOR PLAN 1
Patient with productive green sputum cough, acute SOB, met 4/4 SIRS criteria on admission T 102, RR 25, , WBC 10.8. Was at [facility] 1 week ago for surgery. Initial concern for PE given acuity of symptoms but CT PE negative. CT demonstrated RUL consolidation consistent with hospital-acquired PNA  -c/w zosyn/vancomycin  -azythromycin dosed to MWF for anti-inflammatory benefit, atypical coverage not thought to be necessary at this point  -f/u AM vanc trough 8/22, restart vanc with goal of 15-20  -f/u CXR with improvement in pulmonary edema  -negative blood cultures to date  -negative legionella   -negative RVP  -Echo 8/20 demonstrated EF 65%, dilated left atrium, moderate to severe MR, pulmonary artery systolic pressure 50mmhg.  -c/w PO enalapril 2.5 mg daily. Increased lasix dose to IV lasix 20mg BID  -f/u sputum cx    #Reactive airway disease - Patient requiring intermittent BiPAP for acute SOB, acute wheezing, and respiratory distress. Patient receives solumedrol, duonebs, placed on BiPAP, and ativan 0.25mg as seems to be related to anxiety. HAP with b/l pleural effusions also likely contributing. Possibly undiagnosed asthma exacerbation  CXR chest demonstrates right upper lobe regions of patchy consolidation and interstitial pulmonary edema throughout the bilateral upper lobes and bilateral lower lobes. Low lung volumes. Normal heart size. Small layering persistent bilateral pleural effusions. Reversed right shoulder arthroplasty.  -Patient improving toady, tolerated NC @5-6L O2 well today  -c/w duonebs q4hrs  -c/w IV solumedrol 40mg TID   -c/w pulmicort 0.5mg BID  -c/w BiPAP PRN  -c/w azithromycin MWF Patient with productive green sputum cough, acute SOB, met 4/4 SIRS criteria on admission T 102, RR 25, , WBC 10.8. Was at [facility] 1 week ago for surgery. Initial concern for PE given acuity of symptoms but CT PE negative. CT demonstrated RUL consolidation consistent with hospital-acquired PNA.   -c/w zosyn/vancomycin  -azythromycin dosed to MWF for anti-inflammatory benefit, atypical coverage not thought to be necessary at this point  -AM vanc trough 26.5. F/u PM vanc trough before redosing vanc with goal of 15-20  -f/u CXR with improvement in pulmonary edema  -negative blood cultures to date  -negative legionella   -negative RVP  -f/u repeat sputum cx Patient with productive green sputum cough, acute SOB, met 4/4 SIRS criteria on admission T 102, RR 25, , WBC 10.8. Was at [facility] 1 week ago for surgery. Initial concern for PE given acuity of symptoms but CT PE negative. CT demonstrated RUL consolidation consistent with hospital-acquired PNA.   -c/w zosyn/vancomycin  -azythromycin dosed to MWF for anti-inflammatory benefit, atypical coverage not thought to be necessary at this point  -AM vanc trough 26.5. Today is day 7 of tx. Will f/u repeat sputum for further management  -f/u CXR with improvement in pulmonary edema  -negative blood cultures to date  -negative legionella   -negative RVP

## 2018-08-22 NOTE — PHYSICAL THERAPY INITIAL EVALUATION ADULT - GENERAL OBSERVATIONS, REHAB EVAL
Pt rcvd semi supine, +L IV, +HFNC, +tele with family and friends present. Admitted from [facility] rehab for SOB and PNA, with h/o PD. Of note, pt with GH arthoplasty POD#7 with steristrips in place, tender to touch. Pt reports NWB RUE. Pt A&Ox3, denies SOB at rest. Pt tolerated session fairly well, no SOB. 1 bout dizziness upon sitting. Productive cough with +bloody sputum, RN and team aware.

## 2018-08-22 NOTE — PROGRESS NOTE ADULT - SUBJECTIVE AND OBJECTIVE BOX
INTERVAL HPI/OVERNIGHT EVENTS:  Patient was seen and examined at bedside. As per nurse and patient, no o/n events, patient resting comfortably. No complaints at this time. Patient denies: fever, chills, dizziness, weakness, HA, Changes in vision, CP, palpitations, SOB, cough, N/V/D/C, dysuria, changes in bowel movements, LE edema. ROS otherwise negative.    VITAL SIGNS:  T(F): 98.2 (08-22-18 @ 02:00)  HR: 78 (08-22-18 @ 05:41)  BP: 122/75 (08-22-18 @ 05:41)  RR: 17 (08-22-18 @ 05:41)  SpO2: 95% (08-22-18 @ 05:41)  Wt(kg): --    PHYSICAL EXAM:    Constitutional: WDWN, NAD  HEENT: PERRL, EOMI, sclera non-icteric, neck supple, trachea midline, no masses, no JVD, MMM, good dentition  Respiratory: CTA b/l, good air entry b/l, no wheezing, no rhonchi, no rales, without accessory muscle use and no intercostal retractions  Cardiovascular: RRR, normal S1S2, no M/R/G  Gastrointestinal: soft, NTND, no masses palpable, BS normal  Extremities: Warm, well perfused, pulses equal bilateral upper and lower extremities, no edema, no clubbing. Capillary refill <2 sec  Neurological: AAOx3, CN Grossly intact  Skin: Normal temperature, warm, dry    MEDICATIONS  (STANDING):  ALBUTerol/ipratropium for Nebulization 3 milliLiter(s) Nebulizer every 4 hours  aspirin enteric coated 81 milliGRAM(s) Oral daily  atorvastatin 10 milliGRAM(s) Oral at bedtime  azithromycin   Tablet 500 milliGRAM(s) Oral <User Schedule>  buDESOnide   0.5 milliGRAM(s) Respule 0.5 milliGRAM(s) Inhalation every 12 hours  carbidopa/levodopa  25/100 1.5 Tablet(s) Oral <User Schedule>  carbidopa/levodopa CR 25/100 1 Tablet(s) Oral <User Schedule>  cholecalciferol 1000 Unit(s) Oral daily  cyanocobalamin 1000 MICROGram(s) Oral daily  enalapril 2.5 milliGRAM(s) Oral daily  furosemide   Injectable 20 milliGRAM(s) IV Push every 12 hours  heparin  Injectable 5000 Unit(s) SubCutaneous every 8 hours  methylPREDNISolone sodium succinate Injectable 40 milliGRAM(s) IV Push every 8 hours  piperacillin/tazobactam IVPB. 3.375 Gram(s) IV Intermittent every 6 hours  primidone 50 milliGRAM(s) Oral <User Schedule>  QUEtiapine 25 milliGRAM(s) Oral <User Schedule>  selegiline Oral Tab/Cap 5 milliGRAM(s) Oral <User Schedule>  sertraline 100 milliGRAM(s) Oral <User Schedule>    MEDICATIONS  (PRN):  LORazepam   Injectable 0.5 milliGRAM(s) IV Push every 6 hours PRN Anxiety      Allergies    No Known Allergies    Intolerances        LABS:                        7.7    7.4   )-----------( 425      ( 21 Aug 2018 06:47 )             25.6     08-21    139  |  98  |  20  ----------------------------<  128<H>  3.0<L>   |  25  |  0.84    Ca    8.6      21 Aug 2018 17:03  Phos  5.1     08-21  Mg     2.2     08-21            RADIOLOGY & ADDITIONAL TESTS:  Reviewed INTERVAL HPI/OVERNIGHT EVENTS:  Patient was seen and examined at bedside. As per nurse and patient, no o/n events, patient resting comfortably. Pt was on Bipap overnight. No complaints at this time. Patient denies: fever, chills, dizziness, weakness, HA, Changes in vision, CP, palpitations, SOB, cough, N/V/D/C, dysuria, changes in bowel movements, LE edema. ROS otherwise negative.    VITAL SIGNS:  T(F): 98.2 (08-22-18 @ 02:00)  HR: 78 (08-22-18 @ 05:41)  BP: 122/75 (08-22-18 @ 05:41)  RR: 17 (08-22-18 @ 05:41)  SpO2: 95% (08-22-18 @ 05:41)  Wt(kg): --    PHYSICAL EXAM:    Constitutional: WDWN, NAD  HEENT: PERRL, EOMI, sclera non-icteric, neck supple, trachea midline, no masses, no JVD, MMM, good dentition  Respiratory: CTA b/l, good air entry b/l, no wheezing, no rhonchi, no rales, without accessory muscle use and no intercostal retractions  Cardiovascular: RRR, normal S1S2, no M/R/G  Gastrointestinal: soft, NTND, no masses palpable, BS normal  Extremities: Warm, well perfused, pulses equal bilateral upper and lower extremities, no edema, no clubbing. Capillary refill <2 sec  Neurological: AAOx3, CN Grossly intact  Skin: Normal temperature, warm, dry    MEDICATIONS  (STANDING):  ALBUTerol/ipratropium for Nebulization 3 milliLiter(s) Nebulizer every 4 hours  aspirin enteric coated 81 milliGRAM(s) Oral daily  atorvastatin 10 milliGRAM(s) Oral at bedtime  azithromycin   Tablet 500 milliGRAM(s) Oral <User Schedule>  buDESOnide   0.5 milliGRAM(s) Respule 0.5 milliGRAM(s) Inhalation every 12 hours  carbidopa/levodopa  25/100 1.5 Tablet(s) Oral <User Schedule>  carbidopa/levodopa CR 25/100 1 Tablet(s) Oral <User Schedule>  cholecalciferol 1000 Unit(s) Oral daily  cyanocobalamin 1000 MICROGram(s) Oral daily  enalapril 2.5 milliGRAM(s) Oral daily  furosemide   Injectable 20 milliGRAM(s) IV Push every 12 hours  heparin  Injectable 5000 Unit(s) SubCutaneous every 8 hours  methylPREDNISolone sodium succinate Injectable 40 milliGRAM(s) IV Push every 8 hours  piperacillin/tazobactam IVPB. 3.375 Gram(s) IV Intermittent every 6 hours  primidone 50 milliGRAM(s) Oral <User Schedule>  QUEtiapine 25 milliGRAM(s) Oral <User Schedule>  selegiline Oral Tab/Cap 5 milliGRAM(s) Oral <User Schedule>  sertraline 100 milliGRAM(s) Oral <User Schedule>    MEDICATIONS  (PRN):  LORazepam   Injectable 0.5 milliGRAM(s) IV Push every 6 hours PRN Anxiety      Allergies    No Known Allergies    Intolerances        LABS:                        7.7    7.4   )-----------( 425      ( 21 Aug 2018 06:47 )             25.6     08-21    139  |  98  |  20  ----------------------------<  128<H>  3.0<L>   |  25  |  0.84    Ca    8.6      21 Aug 2018 17:03  Phos  5.1     08-21  Mg     2.2     08-21            RADIOLOGY & ADDITIONAL TESTS:  Reviewed INTERVAL HPI/OVERNIGHT EVENTS:  Patient was seen and examined at bedside. As per nurse and patient, no o/n events, patient resting comfortably. Pt was on Bipap overnight. Pt states cough and breathing is improving. Attempted to deescalate pt from HFNC to NC however pt desaturated to mid 80s and HFNC was resumed.     VITAL SIGNS:  T(F): 98.2 (08-22-18 @ 02:00)  HR: 78 (08-22-18 @ 05:41)  BP: 122/75 (08-22-18 @ 05:41)  RR: 17 (08-22-18 @ 05:41)  SpO2: 95% (08-22-18 @ 05:41)  Wt(kg): --    PHYSICAL EXAM:    Constitutional: WDWN, NAD  HEENT: PERRL, EOMI, sclera non-icteric, neck supple, trachea midline, no masses, no JVD, MMM, good dentition  Respiratory: CTA b/l, good air entry b/l, no wheezing, no rhonchi, no rales, without accessory muscle use and no intercostal retractions  Cardiovascular: RRR, normal S1S2, no M/R/G  Gastrointestinal: soft, NTND, no masses palpable, BS normal  Extremities: Warm, well perfused, pulses equal bilateral upper and lower extremities, no edema, no clubbing. Capillary refill <2 sec  Neurological: AAOx3, CN Grossly intact  Skin: Normal temperature, warm, dry    MEDICATIONS  (STANDING):  ALBUTerol/ipratropium for Nebulization 3 milliLiter(s) Nebulizer every 4 hours  aspirin enteric coated 81 milliGRAM(s) Oral daily  atorvastatin 10 milliGRAM(s) Oral at bedtime  azithromycin   Tablet 500 milliGRAM(s) Oral <User Schedule>  buDESOnide   0.5 milliGRAM(s) Respule 0.5 milliGRAM(s) Inhalation every 12 hours  carbidopa/levodopa  25/100 1.5 Tablet(s) Oral <User Schedule>  carbidopa/levodopa CR 25/100 1 Tablet(s) Oral <User Schedule>  cholecalciferol 1000 Unit(s) Oral daily  cyanocobalamin 1000 MICROGram(s) Oral daily  enalapril 2.5 milliGRAM(s) Oral daily  furosemide   Injectable 20 milliGRAM(s) IV Push every 12 hours  heparin  Injectable 5000 Unit(s) SubCutaneous every 8 hours  methylPREDNISolone sodium succinate Injectable 40 milliGRAM(s) IV Push every 8 hours  piperacillin/tazobactam IVPB. 3.375 Gram(s) IV Intermittent every 6 hours  primidone 50 milliGRAM(s) Oral <User Schedule>  QUEtiapine 25 milliGRAM(s) Oral <User Schedule>  selegiline Oral Tab/Cap 5 milliGRAM(s) Oral <User Schedule>  sertraline 100 milliGRAM(s) Oral <User Schedule>    MEDICATIONS  (PRN):  LORazepam   Injectable 0.5 milliGRAM(s) IV Push every 6 hours PRN Anxiety      Allergies    No Known Allergies    Intolerances        LABS:                        7.7    7.4   )-----------( 425      ( 21 Aug 2018 06:47 )             25.6     08-21    139  |  98  |  20  ----------------------------<  128<H>  3.0<L>   |  25  |  0.84    Ca    8.6      21 Aug 2018 17:03  Phos  5.1     08-21  Mg     2.2     08-21            RADIOLOGY & ADDITIONAL TESTS:  Reviewed INTERVAL HPI/OVERNIGHT EVENTS:  Patient was seen and examined at bedside. As per nurse and patient, no o/n events, patient resting comfortably. Pt was on Bipap overnight. Pt states cough and breathing is improving. Attempted to deescalate pt from HFNC to NC however pt desaturated to mid 80s and HFNC was resumed.     VITAL SIGNS:  T(F): 98.2 (08-22-18 @ 02:00)  HR: 78 (08-22-18 @ 05:41)  BP: 122/75 (08-22-18 @ 05:41)  RR: 17 (08-22-18 @ 05:41)  SpO2: 95% (08-22-18 @ 05:41)  Wt(kg): --    PHYSICAL EXAM:    Constitutional: WDWN, NAD  HEENT: PERRL, EOMI, sclera non-icteric, neck supple, trachea midline, no masses, no JVD, MMM, good dentition  Respiratory: B/L Rales and wheezing. No ronchi, without accessory muscle use and no intercostal retractions  Cardiovascular: RRR, normal S1S2, no M/R/G  Gastrointestinal: soft, NTND, no masses palpable, BS normal  Extremities: Warm, well perfused, pulses equal bilateral upper and lower extremities, no edema, no clubbing. Capillary refill <2 sec  Neurological: AAOx3, CN Grossly intact  Skin: Normal temperature, warm, dry    MEDICATIONS  (STANDING):  ALBUTerol/ipratropium for Nebulization 3 milliLiter(s) Nebulizer every 4 hours  aspirin enteric coated 81 milliGRAM(s) Oral daily  atorvastatin 10 milliGRAM(s) Oral at bedtime  azithromycin   Tablet 500 milliGRAM(s) Oral <User Schedule>  buDESOnide   0.5 milliGRAM(s) Respule 0.5 milliGRAM(s) Inhalation every 12 hours  carbidopa/levodopa  25/100 1.5 Tablet(s) Oral <User Schedule>  carbidopa/levodopa CR 25/100 1 Tablet(s) Oral <User Schedule>  cholecalciferol 1000 Unit(s) Oral daily  cyanocobalamin 1000 MICROGram(s) Oral daily  enalapril 2.5 milliGRAM(s) Oral daily  furosemide   Injectable 20 milliGRAM(s) IV Push every 12 hours  heparin  Injectable 5000 Unit(s) SubCutaneous every 8 hours  methylPREDNISolone sodium succinate Injectable 40 milliGRAM(s) IV Push every 8 hours  piperacillin/tazobactam IVPB. 3.375 Gram(s) IV Intermittent every 6 hours  primidone 50 milliGRAM(s) Oral <User Schedule>  QUEtiapine 25 milliGRAM(s) Oral <User Schedule>  selegiline Oral Tab/Cap 5 milliGRAM(s) Oral <User Schedule>  sertraline 100 milliGRAM(s) Oral <User Schedule>    MEDICATIONS  (PRN):  LORazepam   Injectable 0.5 milliGRAM(s) IV Push every 6 hours PRN Anxiety      Allergies    No Known Allergies    Intolerances        LABS:                        7.7    7.4   )-----------( 425      ( 21 Aug 2018 06:47 )             25.6     08-21    139  |  98  |  20  ----------------------------<  128<H>  3.0<L>   |  25  |  0.84    Ca    8.6      21 Aug 2018 17:03  Phos  5.1     08-21  Mg     2.2     08-21            RADIOLOGY & ADDITIONAL TESTS:  Reviewed

## 2018-08-22 NOTE — PROGRESS NOTE ADULT - PROBLEM SELECTOR PLAN 4
c/w liptior #Reactive airway disease - Patient requiring intermittent BiPAP for acute SOB, acute wheezing, and respiratory distress. Patient receives solumedrol, duonebs, placed on BiPAP, and ativan 0.25mg as seems to be related to anxiety. HAP with b/l pleural effusions also likely contributing. Possibly undiagnosed asthma exacerbation  CXR chest demonstrates right upper lobe regions of patchy consolidation and interstitial pulmonary edema throughout the bilateral upper lobes and bilateral lower lobes. Low lung volumes. Normal heart size. Small layering persistent bilateral pleural effusions. Reversed right shoulder arthroplasty.  -Patient improving toady, tolerated NC @5-6L O2 well today  -c/w duonebs q4hrs  -c/w IV solumedrol 40mg TID   -c/w pulmicort 0.5mg BID  -c/w BiPAP PRN  -c/w azithromycin MWF Patient requiring intermittent BiPAP for acute SOB, acute wheezing, and respiratory distress. Patient receives solumedrol, duonebs, placed on BiPAP, and ativan 0.25mg as seems to be related to anxiety. HAP with b/l pleural effusions also likely contributing. Possibly undiagnosed asthma exacerbation  CXR chest demonstrates right upper lobe regions of patchy consolidation and interstitial pulmonary edema throughout the bilateral upper lobes and bilateral lower lobes. Low lung volumes. Normal heart size. Small layering persistent bilateral pleural effusions. Reversed right shoulder arthroplasty.  -Patient improving toady, tolerated NC @5-6L O2 well today  -c/w duonebs q4hrs  -c/w IV solumedrol 40mg TID   -c/w pulmicort 0.5mg BID  -c/w BiPAP PRN  -c/w azithromycin MWF

## 2018-08-22 NOTE — PROGRESS NOTE ADULT - PROBLEM SELECTOR PLAN 5
F - No IVF at this time.  E - replete for K < 4 and Mg < 2  N - Passed barium swallow. c/w regular diet with thin liquids and small sips to drink    #Hypokalemia: patient found to be hypokalemic 3.3 on AM lab then repeat 5PM BMP with K+ of 3.0 despite repletion. Hypokalemia likely 2/2 lasix  -replete PRN  -continue to monitor q8-12hr BMP     Dispo: [facility]    DVT PPx: [facility] - c/w home meds.

## 2018-08-22 NOTE — PHYSICAL THERAPY INITIAL EVALUATION ADULT - PLANNED THERAPY INTERVENTIONS, PT EVAL
gait training/transfer training/motor coordination training/neuromuscular re-education/strengthening/postural re-education/balance training/bed mobility training

## 2018-08-22 NOTE — PHYSICAL THERAPY INITIAL EVALUATION ADULT - IMPAIRMENTS FOUND, PT EVAL
aerobic capacity/endurance/fine motor/joint integrity and mobility/ergonomics and body mechanics/posture/muscle strength/cognitive impairment/gait, locomotion, and balance

## 2018-08-23 DIAGNOSIS — S42.309A UNSPECIFIED FRACTURE OF SHAFT OF HUMERUS, UNSPECIFIED ARM, INITIAL ENCOUNTER FOR CLOSED FRACTURE: ICD-10-CM

## 2018-08-23 LAB
ANION GAP SERPL CALC-SCNC: 17 MMOL/L — SIGNIFICANT CHANGE UP (ref 5–17)
BUN SERPL-MCNC: 21 MG/DL — SIGNIFICANT CHANGE UP (ref 7–23)
CALCIUM SERPL-MCNC: 9.5 MG/DL — SIGNIFICANT CHANGE UP (ref 8.4–10.5)
CHLORIDE SERPL-SCNC: 100 MMOL/L — SIGNIFICANT CHANGE UP (ref 96–108)
CO2 SERPL-SCNC: 27 MMOL/L — SIGNIFICANT CHANGE UP (ref 22–31)
CREAT SERPL-MCNC: 0.77 MG/DL — SIGNIFICANT CHANGE UP (ref 0.5–1.3)
GLUCOSE SERPL-MCNC: 115 MG/DL — HIGH (ref 70–99)
HCT VFR BLD CALC: 29.2 % — LOW (ref 34.5–45)
HGB BLD-MCNC: 8.8 G/DL — LOW (ref 11.5–15.5)
MAGNESIUM SERPL-MCNC: 2.1 MG/DL — SIGNIFICANT CHANGE UP (ref 1.6–2.6)
MCHC RBC-ENTMCNC: 28.9 PG — SIGNIFICANT CHANGE UP (ref 27–34)
MCHC RBC-ENTMCNC: 30.1 G/DL — LOW (ref 32–36)
MCV RBC AUTO: 96.1 FL — SIGNIFICANT CHANGE UP (ref 80–100)
PHOSPHATE SERPL-MCNC: 4 MG/DL — SIGNIFICANT CHANGE UP (ref 2.5–4.5)
PLATELET # BLD AUTO: 629 K/UL — HIGH (ref 150–400)
POTASSIUM SERPL-MCNC: 4.6 MMOL/L — SIGNIFICANT CHANGE UP (ref 3.5–5.3)
POTASSIUM SERPL-SCNC: 4.6 MMOL/L — SIGNIFICANT CHANGE UP (ref 3.5–5.3)
RBC # BLD: 3.04 M/UL — LOW (ref 3.8–5.2)
RBC # FLD: 15.1 % — SIGNIFICANT CHANGE UP (ref 10.3–16.9)
SODIUM SERPL-SCNC: 144 MMOL/L — SIGNIFICANT CHANGE UP (ref 135–145)
WBC # BLD: 17.7 K/UL — HIGH (ref 3.8–10.5)
WBC # FLD AUTO: 17.7 K/UL — HIGH (ref 3.8–10.5)

## 2018-08-23 PROCEDURE — 93010 ELECTROCARDIOGRAM REPORT: CPT

## 2018-08-23 PROCEDURE — 99233 SBSQ HOSP IP/OBS HIGH 50: CPT | Mod: GC

## 2018-08-23 RX ORDER — FUROSEMIDE 40 MG
40 TABLET ORAL ONCE
Qty: 0 | Refills: 0 | Status: COMPLETED | OUTPATIENT
Start: 2018-08-23 | End: 2018-08-23

## 2018-08-23 RX ORDER — FUROSEMIDE 40 MG
40 TABLET ORAL DAILY
Qty: 0 | Refills: 0 | Status: DISCONTINUED | OUTPATIENT
Start: 2018-08-24 | End: 2018-08-27

## 2018-08-23 RX ORDER — SODIUM CHLORIDE 9 MG/ML
3 INJECTION INTRAMUSCULAR; INTRAVENOUS; SUBCUTANEOUS EVERY 6 HOURS
Qty: 0 | Refills: 0 | Status: DISCONTINUED | OUTPATIENT
Start: 2018-08-23 | End: 2018-08-27

## 2018-08-23 RX ORDER — ACETAMINOPHEN 500 MG
325 TABLET ORAL ONCE
Qty: 0 | Refills: 0 | Status: COMPLETED | OUTPATIENT
Start: 2018-08-23 | End: 2018-08-23

## 2018-08-23 RX ORDER — FUROSEMIDE 40 MG
20 TABLET ORAL ONCE
Qty: 0 | Refills: 0 | Status: DISCONTINUED | OUTPATIENT
Start: 2018-08-23 | End: 2018-08-23

## 2018-08-23 RX ADMIN — CARBIDOPA AND LEVODOPA 1.5 TABLET(S): 25; 100 TABLET ORAL at 07:09

## 2018-08-23 RX ADMIN — PREGABALIN 1000 MICROGRAM(S): 225 CAPSULE ORAL at 11:32

## 2018-08-23 RX ADMIN — SODIUM CHLORIDE 3 MILLILITER(S): 9 INJECTION INTRAMUSCULAR; INTRAVENOUS; SUBCUTANEOUS at 21:17

## 2018-08-23 RX ADMIN — Medication 20 MILLIGRAM(S): at 06:06

## 2018-08-23 RX ADMIN — Medication 40 MILLIGRAM(S): at 17:30

## 2018-08-23 RX ADMIN — CARBIDOPA AND LEVODOPA 1.5 TABLET(S): 25; 100 TABLET ORAL at 11:32

## 2018-08-23 RX ADMIN — Medication 1000 UNIT(S): at 11:32

## 2018-08-23 RX ADMIN — Medication 50 MILLIGRAM(S): at 17:29

## 2018-08-23 RX ADMIN — Medication 81 MILLIGRAM(S): at 11:32

## 2018-08-23 RX ADMIN — Medication 3 MILLILITER(S): at 21:16

## 2018-08-23 RX ADMIN — Medication 3 MILLILITER(S): at 17:29

## 2018-08-23 RX ADMIN — Medication 0.5 MILLIGRAM(S): at 09:40

## 2018-08-23 RX ADMIN — Medication 3 MILLILITER(S): at 06:06

## 2018-08-23 RX ADMIN — QUETIAPINE FUMARATE 25 MILLIGRAM(S): 200 TABLET, FILM COATED ORAL at 21:16

## 2018-08-23 RX ADMIN — HEPARIN SODIUM 5000 UNIT(S): 5000 INJECTION INTRAVENOUS; SUBCUTANEOUS at 21:17

## 2018-08-23 RX ADMIN — Medication 3 MILLILITER(S): at 13:20

## 2018-08-23 RX ADMIN — HEPARIN SODIUM 5000 UNIT(S): 5000 INJECTION INTRAVENOUS; SUBCUTANEOUS at 06:06

## 2018-08-23 RX ADMIN — Medication 325 MILLIGRAM(S): at 21:46

## 2018-08-23 RX ADMIN — HEPARIN SODIUM 5000 UNIT(S): 5000 INJECTION INTRAVENOUS; SUBCUTANEOUS at 13:20

## 2018-08-23 RX ADMIN — CARBIDOPA AND LEVODOPA 1.5 TABLET(S): 25; 100 TABLET ORAL at 17:29

## 2018-08-23 RX ADMIN — SODIUM CHLORIDE 3 MILLILITER(S): 9 INJECTION INTRAMUSCULAR; INTRAVENOUS; SUBCUTANEOUS at 10:56

## 2018-08-23 RX ADMIN — SODIUM CHLORIDE 3 MILLILITER(S): 9 INJECTION INTRAMUSCULAR; INTRAVENOUS; SUBCUTANEOUS at 16:01

## 2018-08-23 RX ADMIN — PRIMIDONE 50 MILLIGRAM(S): 250 TABLET ORAL at 07:09

## 2018-08-23 RX ADMIN — ATORVASTATIN CALCIUM 10 MILLIGRAM(S): 80 TABLET, FILM COATED ORAL at 21:16

## 2018-08-23 RX ADMIN — CARBIDOPA AND LEVODOPA 1 TABLET(S): 25; 100 TABLET ORAL at 21:16

## 2018-08-23 RX ADMIN — Medication 3 MILLILITER(S): at 00:48

## 2018-08-23 RX ADMIN — SERTRALINE 100 MILLIGRAM(S): 25 TABLET, FILM COATED ORAL at 07:09

## 2018-08-23 RX ADMIN — Medication 40 MILLIGRAM(S): at 00:48

## 2018-08-23 RX ADMIN — Medication 3 MILLILITER(S): at 09:40

## 2018-08-23 RX ADMIN — Medication 1200 MILLIGRAM(S): at 19:15

## 2018-08-23 RX ADMIN — Medication 325 MILLIGRAM(S): at 21:16

## 2018-08-23 RX ADMIN — Medication 0.5 MILLIGRAM(S): at 21:16

## 2018-08-23 RX ADMIN — SELEGILINE HYDROCHLORIDE 5 MILLIGRAM(S): 1.25 TABLET, ORALLY DISINTEGRATING ORAL at 07:09

## 2018-08-23 RX ADMIN — Medication 5 MILLIGRAM(S): at 06:06

## 2018-08-23 NOTE — PROGRESS NOTE ADULT - PROBLEM SELECTOR PLAN 3
Echo 8/20 demonstrated EF 65%, dilated left atrium, moderate to severe MR, pulmonary artery systolic pressure 50mmhg.  -Increased PO enalapril to 5 mg daily.   -c/w IV lasix 20mg BID Echo 8/20 demonstrated EF 65%, dilated left atrium, moderate to severe MR, pulmonary artery systolic pressure 50mmhg.  -c/w PO enalapril to 5 mg daily.   -lasix switched to 40mg PO one dose tonight and then 40mg PO qd starting ariana AM

## 2018-08-23 NOTE — PROGRESS NOTE ADULT - PROBLEM SELECTOR PLAN 2
Resolved. Patient met 4/4 SIRS criteria - T 102, RR 25, , WBC 10.8 on admission. Lactate 1.3. RVP negative.  CT PE negative for PE but showed possible RUL consolidation.  - legionella urine antigen negative. Sepsis due to hospital acquired PNA.   -c/w vanc/zosyn   -pneumococcal urine antigen  -afebrile throughout day  - continue to monitor WBCs Resolved. Patient met 4/4 SIRS criteria - T 102, RR 25, , WBC 10.8 on admission. Lactate 1.3. RVP negative.  CT PE negative for PE but showed possible RUL consolidation.  - legionella urine antigen negative. Sepsis due to hospital acquired PNA.   -completed 7 day course vanc/zosyn  -continues to be afebrile  -continue to monitor WBCs  -starting nebulized saline for secretions   -aerobika device

## 2018-08-23 NOTE — PROGRESS NOTE ADULT - PROBLEM SELECTOR PLAN 1
Patient with productive green sputum cough, acute SOB, met 4/4 SIRS criteria on admission T 102, RR 25, , WBC 10.8. Was at [facility] 1 week ago for surgery. Initial concern for PE given acuity of symptoms but CT PE negative. CT demonstrated RUL consolidation consistent with hospital-acquired PNA.   -c/w zosyn/vancomycin  -azythromycin dosed to MWF for anti-inflammatory benefit, atypical coverage not thought to be necessary at this point  -AM vanc trough 26.5. Today is day 7 of tx. Will f/u repeat sputum for further management  -f/u CXR with improvement in pulmonary edema  -negative blood cultures to date  -negative legionella   -negative RVP Patient with productive green sputum cough, acute SOB, met 4/4 SIRS criteria on admission T 102, RR 25, , WBC 10.8. Was at [facility] 1 week ago for surgery. Initial concern for PE given acuity of symptoms but CT PE negative. CT demonstrated RUL consolidation consistent with hospital-acquired PNA.   -finished 7 day course of vanc/zosyn 8/22  -azythromycin dosed to MWF for anti-inflammatory benefit, atypical coverage not thought to be necessary at this point  -f/u CXR with improvement in pulmonary edema  -negative blood cultures to date  -negative legionella   -negative RVP

## 2018-08-23 NOTE — CHART NOTE - NSCHARTNOTEFT_GEN_A_CORE
Admitting Diagnosis:   Patient is a 73y old  Female who presents with a chief complaint of     PAST MEDICAL & SURGICAL HISTORY:  Hypercholesteremia  Parkinsons  History of right shoulder replacement: x2      Current Nutrition Order:  Regular      PO Intake: Good (%) [   ]  Fair (50-75%) [ X  ] Poor (<25%) [   ]    GI Issues: No N/V/C/D reported at this time. +BM    Pain: Pt denies pain at this time     Skin Integrity: Brian 18, surgical wound (shoulder)    Labs:   08-23    144  |  100  |  21  ----------------------------<  115<H>  4.6   |  27  |  0.77    Ca    9.5      23 Aug 2018 08:35  Phos  4.0     08-23  Mg     2.1     08-23      CAPILLARY BLOOD GLUCOSE          Medications:  MEDICATIONS  (STANDING):  ALBUTerol/ipratropium for Nebulization 3 milliLiter(s) Nebulizer every 4 hours  aspirin enteric coated 81 milliGRAM(s) Oral daily  atorvastatin 10 milliGRAM(s) Oral at bedtime  azithromycin   Tablet 500 milliGRAM(s) Oral <User Schedule>  buDESOnide   0.5 milliGRAM(s) Respule 0.5 milliGRAM(s) Inhalation every 12 hours  carbidopa/levodopa  25/100 1.5 Tablet(s) Oral <User Schedule>  carbidopa/levodopa CR 25/100 1 Tablet(s) Oral <User Schedule>  cholecalciferol 1000 Unit(s) Oral daily  cyanocobalamin 1000 MICROGram(s) Oral daily  enalapril 5 milliGRAM(s) Oral daily  furosemide    Tablet 40 milliGRAM(s) Oral once  heparin  Injectable 5000 Unit(s) SubCutaneous every 8 hours  lidocaine   Patch 1 Patch Transdermal daily  predniSONE   Tablet 50 milliGRAM(s) Oral once  primidone 50 milliGRAM(s) Oral <User Schedule>  QUEtiapine 25 milliGRAM(s) Oral <User Schedule>  selegiline Oral Tab/Cap 5 milliGRAM(s) Oral <User Schedule>  sertraline 100 milliGRAM(s) Oral <User Schedule>  sodium chloride 3%  Inhalation 3 milliLiter(s) Inhalation every 6 hours    MEDICATIONS  (PRN):  LORazepam   Injectable 0.5 milliGRAM(s) IV Push every 6 hours PRN Anxiety      Weight: 60.8kg   Daily     Daily     Weight Change: No new weights recorded since admit     Estimated energy needs: ABW used for calculations as pt between % of IBW. Needs estimated for maintenance in older adults. Increased protein for post-surgery wound healing   Calories: 20-25 kcal/kg = 9150-3448 kcal/day  Protein: 1.2-1.4 g/kg = 73-85g protein/day  Fluids: 30-35 mL/kg = 0017-1848 mL/day    Subjective: 72 yo/female with PMHx Parkinsons, HLD, recent R. shoulder surgery, presented from rehab with SOB. Pt found to be tachypneic and hypoxic, likely 2/2 HAP. Pt passed MBS on 8/21 and cleared for regular diet with thin liquids, with high aspiration precautions and only small sips of liquids. Pt seen in room, awake, alert, pleasant, breathing comfortably on NC. She endorses fair appetite and intake- noting ~50-60% intake at breakfast today. No N/V/C/D reported at this time. -Pain. Lytes WNL. Encouraged PO intake. Possibly for DC on Monday per care coordinator note.     Previous Nutrition Diagnosis:  Inadequate energy intake RT current NPO diet order AEB 0% of EER being met at this time    Active [   ]  Resolved [  X ]    If resolved, new PES: Increased protein needs RT increased demand for protein intake AEB s/p shoulder surgery/wound healing     Goal: Pt will meet % of EER per day with good tolerance     Recommendations:  1. Encourage PO intake; maintain aspiration precautions at all times  2. Monitor lytes and replete prn.   3. Ensure pain control    Education: Encourage PO intake    Risk Level: High [   ] Moderate [ X  ] Low [   ]

## 2018-08-23 NOTE — PROGRESS NOTE ADULT - PROBLEM SELECTOR PLAN 5
- c/w home meds. pt had recent shoulder replacement prior to admission. Was complaining of severe R shoulder pain. Shoulder xray 8/22 showed Periprosthetic fracture of the right humerus. Ortho surgeon from [facility] contacted  -hold off on shoulder PT for now  -arm in sling for stabilization  -will f/u with [facility] orthopedist

## 2018-08-23 NOTE — PROGRESS NOTE ADULT - PROBLEM SELECTOR PLAN 7
F - No IVF at this time.  E - replete for K < 4 and Mg < 2  N - Passed barium swallow. c/w regular diet with thin liquids and small sips to drink    #Hypokalemia: patient found to be hypokalemic 3.3 on AM lab then repeat 5PM BMP with K+ of 3.0 despite repletion. Hypokalemia likely 2/2 lasix  -replete PRN  -continue to monitor q8-12hr BMP     Dispo: [facility]    DVT PPx: SCD[facility] c/w liptior

## 2018-08-23 NOTE — PROGRESS NOTE ADULT - PROBLEM SELECTOR PLAN 4
Patient requiring intermittent BiPAP for acute SOB, acute wheezing, and respiratory distress. Patient receives solumedrol, duonebs, placed on BiPAP, and ativan 0.25mg as seems to be related to anxiety. HAP with b/l pleural effusions also likely contributing. Possibly undiagnosed asthma exacerbation  CXR chest demonstrates right upper lobe regions of patchy consolidation and interstitial pulmonary edema throughout the bilateral upper lobes and bilateral lower lobes. Low lung volumes. Normal heart size. Small layering persistent bilateral pleural effusions. Reversed right shoulder arthroplasty.  -Patient improving toady, tolerated NC @5-6L O2 well today  -c/w duonebs q4hrs  -c/w IV solumedrol 40mg TID   -c/w pulmicort 0.5mg BID  -c/w BiPAP PRN  -c/w azithromycin MWF Patient requiring intermittent BiPAP for acute SOB, acute wheezing, and respiratory distress. Patient receives solumedrol, duonebs, placed on BiPAP, and ativan 0.25mg as seems to be related to anxiety. HAP with b/l pleural effusions also likely contributing. Possibly undiagnosed asthma exacerbation  CXR chest demonstrates right upper lobe regions of patchy consolidation and interstitial pulmonary edema throughout the bilateral upper lobes and bilateral lower lobes. Low lung volumes. Normal heart size. Small layering persistent bilateral pleural effusions. Reversed right shoulder arthroplasty.  -Patient improving toady, tolerated NC @5-6L O2 well today  -c/w duonebs q4hrs  -switched to PO prednisone 50 PO/day through 8/27  -c/w pulmicort 0.5mg BID  -c/w BiPAP PRN  -c/w azithromycin MWF

## 2018-08-23 NOTE — PROGRESS NOTE ADULT - ASSESSMENT
72F [facility] Parkinson's Disease, R. shoulder surgery (7da at [facility]) who was at rehab facility and experience acute onset shortness of breath 2/2 HAP PNA.

## 2018-08-23 NOTE — PROGRESS NOTE ADULT - SUBJECTIVE AND OBJECTIVE BOX
INTERVAL HPI/OVERNIGHT EVENTS:  Patient was seen and examined at bedside. As per nurse and patient, no o/n events, patient resting comfortably. No complaints at this time. Patient denies: fever, chills, dizziness, weakness, HA, Changes in vision, CP, palpitations, SOB, cough, N/V/D/C, dysuria, changes in bowel movements, LE edema. ROS otherwise negative.    VITAL SIGNS:  T(F): 98.1 (08-23-18 @ 01:53)  HR: 82 (08-23-18 @ 04:22)  BP: 119/70 (08-23-18 @ 00:55)  RR: 20 (08-23-18 @ 04:22)  SpO2: 96% (08-23-18 @ 04:22)  Wt(kg): --    PHYSICAL EXAM:    Constitutional: WDWN, NAD  HEENT: PERRL, EOMI, sclera non-icteric, neck supple, trachea midline, no masses, no JVD, MMM, good dentition  Respiratory: CTA b/l, good air entry b/l, no wheezing, no rhonchi, no rales, without accessory muscle use and no intercostal retractions  Cardiovascular: RRR, normal S1S2, no M/R/G  Gastrointestinal: soft, NTND, no masses palpable, BS normal  Extremities: Warm, well perfused, pulses equal bilateral upper and lower extremities, no edema, no clubbing. Capillary refill <2 sec  Neurological: AAOx3, CN Grossly intact  Skin: Normal temperature, warm, dry    MEDICATIONS  (STANDING):  ALBUTerol/ipratropium for Nebulization 3 milliLiter(s) Nebulizer every 4 hours  aspirin enteric coated 81 milliGRAM(s) Oral daily  atorvastatin 10 milliGRAM(s) Oral at bedtime  azithromycin   Tablet 500 milliGRAM(s) Oral <User Schedule>  buDESOnide   0.5 milliGRAM(s) Respule 0.5 milliGRAM(s) Inhalation every 12 hours  carbidopa/levodopa  25/100 1.5 Tablet(s) Oral <User Schedule>  carbidopa/levodopa CR 25/100 1 Tablet(s) Oral <User Schedule>  cholecalciferol 1000 Unit(s) Oral daily  cyanocobalamin 1000 MICROGram(s) Oral daily  enalapril 5 milliGRAM(s) Oral daily  furosemide   Injectable 20 milliGRAM(s) IV Push every 12 hours  heparin  Injectable 5000 Unit(s) SubCutaneous every 8 hours  lidocaine   Patch 1 Patch Transdermal daily  methylPREDNISolone sodium succinate Injectable 40 milliGRAM(s) IV Push every 8 hours  primidone 50 milliGRAM(s) Oral <User Schedule>  QUEtiapine 25 milliGRAM(s) Oral <User Schedule>  selegiline Oral Tab/Cap 5 milliGRAM(s) Oral <User Schedule>  sertraline 100 milliGRAM(s) Oral <User Schedule>    MEDICATIONS  (PRN):  LORazepam   Injectable 0.5 milliGRAM(s) IV Push every 6 hours PRN Anxiety      Allergies    No Known Allergies    Intolerances        LABS:                        7.8    11.8  )-----------( 450      ( 22 Aug 2018 06:03 )             25.2     08-22    142  |  100  |  22  ----------------------------<  156<H>  3.5   |  24  |  0.84    Ca    9.3      22 Aug 2018 17:10  Phos  3.4     08-22  Mg     2.2     08-22            RADIOLOGY & ADDITIONAL TESTS:  Reviewed INTERVAL HPI/OVERNIGHT EVENTS:  Patient was seen and examined at bedside. As per nurse and patient, no o/n events, patient resting comfortably. Complains of continued nagging cough    VITAL SIGNS:  T(F): 98.1 (08-23-18 @ 01:53)  HR: 82 (08-23-18 @ 04:22)  BP: 119/70 (08-23-18 @ 00:55)  RR: 20 (08-23-18 @ 04:22)  SpO2: 96% (08-23-18 @ 04:22)  Wt(kg): --    PHYSICAL EXAM:    Constitutional: WDWN, NAD; R arm in sling  HEENT: PERRL, EOMI, sclera non-icteric, neck supple, trachea midline, no masses, no JVD, MMM, good dentition  Respiratory: B/L Rales. No wheezing or rhonchi without accessory muscle use and no intercostal retractions  Cardiovascular: RRR, normal S1S2, no M/R/G  Gastrointestinal: soft, NTND, no masses palpable, BS normal  Extremities: Warm, well perfused, pulses equal bilateral upper and lower extremities, no edema, no clubbing. Capillary refill <2 sec  Neurological: AAOx3, CN Grossly intact  Skin: Normal temperature, warm, dry    MEDICATIONS  (STANDING):  ALBUTerol/ipratropium for Nebulization 3 milliLiter(s) Nebulizer every 4 hours  aspirin enteric coated 81 milliGRAM(s) Oral daily  atorvastatin 10 milliGRAM(s) Oral at bedtime  azithromycin   Tablet 500 milliGRAM(s) Oral <User Schedule>  buDESOnide   0.5 milliGRAM(s) Respule 0.5 milliGRAM(s) Inhalation every 12 hours  carbidopa/levodopa  25/100 1.5 Tablet(s) Oral <User Schedule>  carbidopa/levodopa CR 25/100 1 Tablet(s) Oral <User Schedule>  cholecalciferol 1000 Unit(s) Oral daily  cyanocobalamin 1000 MICROGram(s) Oral daily  enalapril 5 milliGRAM(s) Oral daily  furosemide   Injectable 20 milliGRAM(s) IV Push every 12 hours  heparin  Injectable 5000 Unit(s) SubCutaneous every 8 hours  lidocaine   Patch 1 Patch Transdermal daily  methylPREDNISolone sodium succinate Injectable 40 milliGRAM(s) IV Push every 8 hours  primidone 50 milliGRAM(s) Oral <User Schedule>  QUEtiapine 25 milliGRAM(s) Oral <User Schedule>  selegiline Oral Tab/Cap 5 milliGRAM(s) Oral <User Schedule>  sertraline 100 milliGRAM(s) Oral <User Schedule>    MEDICATIONS  (PRN):  LORazepam   Injectable 0.5 milliGRAM(s) IV Push every 6 hours PRN Anxiety      Allergies    No Known Allergies    Intolerances        LABS:                        7.8    11.8  )-----------( 450      ( 22 Aug 2018 06:03 )             25.2     08-22    142  |  100  |  22  ----------------------------<  156<H>  3.5   |  24  |  0.84    Ca    9.3      22 Aug 2018 17:10  Phos  3.4     08-22  Mg     2.2     08-22            RADIOLOGY & ADDITIONAL TESTS:  Reviewed

## 2018-08-24 ENCOUNTER — TRANSCRIPTION ENCOUNTER (OUTPATIENT)
Age: 73
End: 2018-08-24

## 2018-08-24 DIAGNOSIS — I34.0 NONRHEUMATIC MITRAL (VALVE) INSUFFICIENCY: ICD-10-CM

## 2018-08-24 LAB
ANION GAP SERPL CALC-SCNC: 15 MMOL/L — SIGNIFICANT CHANGE UP (ref 5–17)
BUN SERPL-MCNC: 21 MG/DL — SIGNIFICANT CHANGE UP (ref 7–23)
CALCIUM SERPL-MCNC: 8.8 MG/DL — SIGNIFICANT CHANGE UP (ref 8.4–10.5)
CHLORIDE SERPL-SCNC: 96 MMOL/L — SIGNIFICANT CHANGE UP (ref 96–108)
CO2 SERPL-SCNC: 31 MMOL/L — SIGNIFICANT CHANGE UP (ref 22–31)
CREAT SERPL-MCNC: 0.67 MG/DL — SIGNIFICANT CHANGE UP (ref 0.5–1.3)
GLUCOSE SERPL-MCNC: 150 MG/DL — HIGH (ref 70–99)
HCT VFR BLD CALC: 28 % — LOW (ref 34.5–45)
HGB BLD-MCNC: 8.6 G/DL — LOW (ref 11.5–15.5)
MCHC RBC-ENTMCNC: 29.1 PG — SIGNIFICANT CHANGE UP (ref 27–34)
MCHC RBC-ENTMCNC: 30.7 G/DL — LOW (ref 32–36)
MCV RBC AUTO: 94.6 FL — SIGNIFICANT CHANGE UP (ref 80–100)
PLATELET # BLD AUTO: 518 K/UL — HIGH (ref 150–400)
POTASSIUM SERPL-MCNC: 4.3 MMOL/L — SIGNIFICANT CHANGE UP (ref 3.5–5.3)
POTASSIUM SERPL-SCNC: 4.3 MMOL/L — SIGNIFICANT CHANGE UP (ref 3.5–5.3)
RBC # BLD: 2.96 M/UL — LOW (ref 3.8–5.2)
RBC # FLD: 15 % — SIGNIFICANT CHANGE UP (ref 10.3–16.9)
SODIUM SERPL-SCNC: 142 MMOL/L — SIGNIFICANT CHANGE UP (ref 135–145)
WBC # BLD: 13 K/UL — HIGH (ref 3.8–10.5)
WBC # FLD AUTO: 13 K/UL — HIGH (ref 3.8–10.5)

## 2018-08-24 PROCEDURE — 99233 SBSQ HOSP IP/OBS HIGH 50: CPT | Mod: GC

## 2018-08-24 PROCEDURE — 71045 X-RAY EXAM CHEST 1 VIEW: CPT | Mod: 26

## 2018-08-24 RX ORDER — ACETAMINOPHEN 500 MG
650 TABLET ORAL EVERY 6 HOURS
Qty: 0 | Refills: 0 | Status: DISCONTINUED | OUTPATIENT
Start: 2018-08-24 | End: 2018-08-25

## 2018-08-24 RX ORDER — IPRATROPIUM/ALBUTEROL SULFATE 18-103MCG
3 AEROSOL WITH ADAPTER (GRAM) INHALATION EVERY 4 HOURS
Qty: 0 | Refills: 0 | Status: DISCONTINUED | OUTPATIENT
Start: 2018-08-24 | End: 2018-08-26

## 2018-08-24 RX ORDER — TIOTROPIUM BROMIDE AND OLODATEROL 3.124; 2.736 UG/1; UG/1
2 SPRAY, METERED RESPIRATORY (INHALATION) DAILY
Qty: 0 | Refills: 0 | Status: DISCONTINUED | OUTPATIENT
Start: 2018-08-24 | End: 2018-08-30

## 2018-08-24 RX ORDER — IPRATROPIUM/ALBUTEROL SULFATE 18-103MCG
3 AEROSOL WITH ADAPTER (GRAM) INHALATION ONCE
Qty: 0 | Refills: 0 | Status: DISCONTINUED | OUTPATIENT
Start: 2018-08-24 | End: 2018-08-26

## 2018-08-24 RX ADMIN — SODIUM CHLORIDE 3 MILLILITER(S): 9 INJECTION INTRAMUSCULAR; INTRAVENOUS; SUBCUTANEOUS at 07:05

## 2018-08-24 RX ADMIN — CARBIDOPA AND LEVODOPA 1.5 TABLET(S): 25; 100 TABLET ORAL at 07:08

## 2018-08-24 RX ADMIN — Medication 50 MILLIGRAM(S): at 07:06

## 2018-08-24 RX ADMIN — HEPARIN SODIUM 5000 UNIT(S): 5000 INJECTION INTRAVENOUS; SUBCUTANEOUS at 07:05

## 2018-08-24 RX ADMIN — QUETIAPINE FUMARATE 25 MILLIGRAM(S): 200 TABLET, FILM COATED ORAL at 22:13

## 2018-08-24 RX ADMIN — PRIMIDONE 50 MILLIGRAM(S): 250 TABLET ORAL at 07:06

## 2018-08-24 RX ADMIN — CARBIDOPA AND LEVODOPA 1.5 TABLET(S): 25; 100 TABLET ORAL at 17:13

## 2018-08-24 RX ADMIN — AZITHROMYCIN 500 MILLIGRAM(S): 500 TABLET, FILM COATED ORAL at 11:17

## 2018-08-24 RX ADMIN — ATORVASTATIN CALCIUM 10 MILLIGRAM(S): 80 TABLET, FILM COATED ORAL at 22:13

## 2018-08-24 RX ADMIN — HEPARIN SODIUM 5000 UNIT(S): 5000 INJECTION INTRAVENOUS; SUBCUTANEOUS at 15:25

## 2018-08-24 RX ADMIN — Medication 5 MILLIGRAM(S): at 07:09

## 2018-08-24 RX ADMIN — CARBIDOPA AND LEVODOPA 1.5 TABLET(S): 25; 100 TABLET ORAL at 11:16

## 2018-08-24 RX ADMIN — SODIUM CHLORIDE 3 MILLILITER(S): 9 INJECTION INTRAMUSCULAR; INTRAVENOUS; SUBCUTANEOUS at 18:49

## 2018-08-24 RX ADMIN — SERTRALINE 100 MILLIGRAM(S): 25 TABLET, FILM COATED ORAL at 07:06

## 2018-08-24 RX ADMIN — Medication 3 MILLILITER(S): at 11:17

## 2018-08-24 RX ADMIN — CARBIDOPA AND LEVODOPA 1 TABLET(S): 25; 100 TABLET ORAL at 22:13

## 2018-08-24 RX ADMIN — Medication 0.5 MILLIGRAM(S): at 10:09

## 2018-08-24 RX ADMIN — PREGABALIN 1000 MICROGRAM(S): 225 CAPSULE ORAL at 11:17

## 2018-08-24 RX ADMIN — TIOTROPIUM BROMIDE AND OLODATEROL 2 PUFF(S): 3.124; 2.736 SPRAY, METERED RESPIRATORY (INHALATION) at 11:19

## 2018-08-24 RX ADMIN — Medication 81 MILLIGRAM(S): at 11:17

## 2018-08-24 RX ADMIN — Medication 1200 MILLIGRAM(S): at 17:13

## 2018-08-24 RX ADMIN — Medication 3 MILLILITER(S): at 15:25

## 2018-08-24 RX ADMIN — Medication 650 MILLIGRAM(S): at 22:13

## 2018-08-24 RX ADMIN — Medication 3 MILLILITER(S): at 07:06

## 2018-08-24 RX ADMIN — Medication 40 MILLIGRAM(S): at 07:08

## 2018-08-24 RX ADMIN — Medication 1000 UNIT(S): at 11:17

## 2018-08-24 RX ADMIN — Medication 0.5 MILLIGRAM(S): at 22:13

## 2018-08-24 RX ADMIN — SODIUM CHLORIDE 3 MILLILITER(S): 9 INJECTION INTRAMUSCULAR; INTRAVENOUS; SUBCUTANEOUS at 14:30

## 2018-08-24 RX ADMIN — SELEGILINE HYDROCHLORIDE 5 MILLIGRAM(S): 1.25 TABLET, ORALLY DISINTEGRATING ORAL at 07:27

## 2018-08-24 RX ADMIN — HEPARIN SODIUM 5000 UNIT(S): 5000 INJECTION INTRAVENOUS; SUBCUTANEOUS at 22:13

## 2018-08-24 RX ADMIN — Medication 1200 MILLIGRAM(S): at 07:08

## 2018-08-24 NOTE — SWALLOW FEES ASSESSMENT ADULT - RECOMMENDED FEEDING/EATING TECHNIQUES
allow for swallow between intakes/alternate food with liquid/maintain upright posture during/after eating for 30 mins/oral hygiene/position upright (90 degrees)/small sips/bites

## 2018-08-24 NOTE — DISCHARGE NOTE ADULT - ADDITIONAL INSTRUCTIONS
Please follow up in 2-3 weeks with Dr. Delgado to discuss mitral valve repair  Please follow up with Dr. Smith Please follow up in 2-3 weeks with Dr. Delgado to discuss mitral valve repair  Please follow up with Dr. Smith  Please follow up with Dr. Arechiga.   Please weigh yourself every day. If you have greater than a 10lb weight increase please call Dr. Arechiga's office because you may need to restart lasix. Please go to your appointment with Dr. Arechiga on September 10 at 1pm.   Please follow up in 2-3 weeks with Dr. Delgado to discuss mitral valve repair  Please follow up with Dr. Smith  Please go to your appointment with Dr. Arechiga on September 10 at 1pm.  Please weigh yourself every day. If you have greater than a 10lb weight increase please call Dr. Arechiga's office because you may need to restart lasix.  Please make an appointment with Dr. Delgado in 2-3 weeks after you [facility] at [address] [place] [address] 4th Floor. Office number [phone].  Please make an appointment with Dr. Smith once you leave [facility]. Office number [phone]

## 2018-08-24 NOTE — PROGRESS NOTE ADULT - PROBLEM SELECTOR PLAN 1
Patient with productive green sputum cough, acute SOB, met 4/4 SIRS criteria on admission T 102, RR 25, , WBC 10.8. Was at [facility] 1 week ago for surgery. Initial concern for PE given acuity of symptoms but CT PE negative. CT demonstrated RUL consolidation consistent with hospital-acquired PNA.   -finished 7 day course of vanc/zosyn 8/22  -azythromycin dosed to MWF for anti-inflammatory benefit, atypical coverage not thought to be necessary at this point  -f/u CXR with improvement in pulmonary edema  -negative blood cultures to date  -negative legionella   -negative RVP Patient with productive green sputum cough, acute SOB, met 4/4 SIRS criteria on admission T 102, RR 25, , WBC 10.8. Was at [facility] 1 week before admission for shoulder replacement surgery. Initial concern for PE given acuity of symptoms but CT PE negative. CT demonstrated RUL consolidation consistent with hospital-acquired PNA.   -finished 7 day course of vanc/zosyn 8/22  -azythromycin dosed to MWF for anti-inflammatory benefit, atypical coverage not thought to be necessary at this point  -f/u CXR with improvement in pulmonary edema  -negative blood cultures to date  -negative legionella   -negative RVP

## 2018-08-24 NOTE — CHART NOTE - NSCHARTNOTEFT_GEN_A_CORE
Discussed with Dr. Delgado.  Patient has active PNA, on treatment currently.  Will require medical optimization prior to being considered for surgical intervention.  She is stable from the mitral valve standpoint and can follow up with Dr. Delgado outpatient.  Please have her follow up in 2-3 weeks at [address] [place] [address] 4th floor.  Office number #[phone].    Luisa Lema PA-C

## 2018-08-24 NOTE — PROGRESS NOTE ADULT - PROBLEM SELECTOR PLAN 4
Patient requiring intermittent BiPAP for acute SOB, acute wheezing, and respiratory distress. Patient receives solumedrol, duonebs, placed on BiPAP, and ativan 0.25mg as seems to be related to anxiety. HAP with b/l pleural effusions also likely contributing. Possibly undiagnosed asthma exacerbation  CXR chest demonstrates right upper lobe regions of patchy consolidation and interstitial pulmonary edema throughout the bilateral upper lobes and bilateral lower lobes. Low lung volumes. Normal heart size. Small layering persistent bilateral pleural effusions. Reversed right shoulder arthroplasty.  -Patient improving toady, tolerated NC @5-6L O2 well today  -c/w duonebs q4hrs  -switched to PO prednisone 50 PO/day through 8/27  -c/w pulmicort 0.5mg BID  -c/w BiPAP PRN  -c/w azithromycin MWF Patient requiring intermittent BiPAP for acute SOB, acute wheezing, and respiratory distress. Patient receives solumedrol, duonebs, placed on BiPAP, and ativan 0.25mg as seems to be related to anxiety. HAP with b/l pleural effusions also likely contributing. Possibly undiagnosed asthma exacerbation  CXR chest demonstrates right upper lobe regions of patchy consolidation and interstitial pulmonary edema throughout the bilateral upper lobes and bilateral lower lobes. Low lung volumes. Normal heart size. Small layering persistent bilateral pleural effusions. Reversed right shoulder arthroplasty.  -Patient improving toady, tolerated NC @5-6L O2 well today  -c/w duonebs q4hrs  -c/w PO prednisone 50 PO/day through 8/27  -starting stiolto and c/w pulmicort 0.5mg BID  -inhaler teaching  -c/w BiPAP PRN  -c/w azithromycin MWF  -c/w nebulized saline and mucinex for secretions   -aerobika device and chest PT

## 2018-08-24 NOTE — SWALLOW FEES ASSESSMENT ADULT - DIAGNOSTIC IMPRESSIONS
Patient presents with a mild pharyngeal dysphagia characterized by partially reduced pharyngeal swallow efficiency resulting in mild to moderate diffuse pharyngeal residue with solid > mechanical soft solid consistencies but with ability to clear with cued dry swallows and liquid wash.  Patient with coughing after the initial swallow on solid trial but cough not related to penetration/aspiration event.  No airway protection deficits noted with other consistencies tested.

## 2018-08-24 NOTE — DISCHARGE NOTE ADULT - MEDICATION SUMMARY - MEDICATIONS TO STOP TAKING
I will STOP taking the medications listed below when I get home from the hospital:    selegiline 5 mg oral tablet  -- 1 cap(s) by mouth once a day (in the morning)    enalapril 5 mg oral tablet  -- 1 tab(s) by mouth once a day

## 2018-08-24 NOTE — DISCHARGE NOTE ADULT - PATIENT PORTAL LINK FT
You can access the [facility], offered by [facility], by registering with the following website: [email or web address]

## 2018-08-24 NOTE — DISCHARGE NOTE ADULT - SECONDARY DIAGNOSIS.
Acute respiratory failure with hypoxia Reactive airway disease with acute exacerbation, unspecified asthma severity, unspecified whether persistent Pulmonary hypertension Mitral regurgitation Humeral fracture Parkinsons Orthostatic hypotension

## 2018-08-24 NOTE — PROGRESS NOTE ADULT - PROBLEM SELECTOR PLAN 5
pt had recent shoulder replacement prior to admission. Was complaining of severe R shoulder pain. Shoulder xray 8/22 showed Periprosthetic fracture of the right humerus. Ortho surgeon from [facility] contacted  -hold off on shoulder PT for now  -arm in sling for stabilization  -will f/u with [facility] orthopedist Pt was found to have mod-severe mitral regurg on echo  -c/w enalapril 5mg/day   -as per CT sx, will f/u as outpatient with Dr. Pacheco

## 2018-08-24 NOTE — SWALLOW FEES ASSESSMENT ADULT - PHARYNGEAL PHASE COMMENTS
Initiation of pharyngeal swallow response was generally timely.  Pharyngeal swallow efficiency was partially reduced, which resulted in mild to moderate diffuse pharyngeal residue with solid > mechanical soft solid trial.  Residue cleared with cued dry swallows and liquid wash.  Pt with coughing after the initial swallow for solid trial, however, cough did not appear to be related to penetration/aspiration.  No airway protection deficits noted with other consistencies tested.

## 2018-08-24 NOTE — DISCHARGE NOTE ADULT - PLAN OF CARE
You were found to have moderate to severe mitral regurgitation during this hospitalization. You were seen by the cardiothoracic team who stated that you should be medically optimized and should follow up as an outpatient. You were started on lasix which is a diuretic and enalapril. Please continue with these medications and follow up with Dr. Pacheco You were found to have a periprosthetic fracture of your Right humerus. Follow up with your orthopedist at [facility]. Continue with your home medications Please make an appointment with Dr. Smith's office. You came to the hospital because you weren't breathing well. You were found to have pneumonia and treated with IV antibiotics. Your pneumonia has resolved. Please In the hospital you were found to have reactive airway disease. Please make an appointment with Dr. Smith. Echo in the hospital showed that you have a heart murmur that is due to your history of rheumatic heart disease. You were evaluated by cardiology. Please make appointments with Dr. Delgado and Dr. Dee. You were found to have a periprosthetic fracture of your Right humerus. Follow up with your orthopedist at [facility]. Please continue your home medications. You came to the hospital because you weren't breathing well. You were found to have pneumonia and treated with IV antibiotics. Your pneumonia has resolved. Please follow up with Dr. Smith. Echo in the hospital showed that you have a heart murmur that is due to your history of rheumatic heart disease. You were evaluated by cardiology. Please make appointments with Dr. Delgado and Dr. Dee. Please weigh yourself every day. If you have greater than a 10lb weight increase please call Dr. Arechiga's office because you may need to restart lasix. Please make an appointment with Dr. Smith's office [phone] once you leave [facility]. You came to the hospital because you weren't breathing well. You were found to have pneumonia and treated with IV antibiotics. Your pneumonia has resolved. Please follow up with Dr. Smith once you leave [facility]. Please make an appointment with Dr. Smith's office [phone] once you leave [facility]. In the hospital you were found to have reactive airway disease. Please make an appointment with Dr. Smith once you leave [facility]. Echo in the hospital showed that you have a heart murmur that is due to your history of rheumatic heart disease. You were evaluated by cardiology. Please go to your appointment with cardiologist Dr. Dee on September 10 at 1pm.  If you have greater than a 10lb weight increase please call Dr. Arechiga's office because you may need to restart lasix. Please set up an appointment with cardiothoracic surgeon Dr. Delgado in 2-3 weeks after you leave [facility]. His office number is [phone]. Please follow up with your neurologist once you leave [facility]. You have a history of Parkinson's disease. You had low blood pressure in the hospital that is likely due to your Parkinson's disease. We changed your Parkinson's medications around to help your symptoms. Please take 1.5 tablet carbidopa at 8am, noon and 430pm like you took the medication before your hospital stay. We stopped your long acting cinamet and selegiline that you take at night because we thought it might be contributing to your low blood pressure Please do not take long acting night time cinamet and seligiline anymore until you follow up with your neurologist. You were found to have low blood pressure in the hospital. Your low blood pressure is likely due to your Parkinson's disease. You were started on midodrine 5mg one tablet three times a day. Please continue to take midodrine as prescribed and follow up with your neurologist.

## 2018-08-24 NOTE — PROGRESS NOTE ADULT - PROBLEM SELECTOR PLAN 6
- c/w home meds. pt had recent shoulder replacement prior to admission. Was complaining of severe R shoulder pain. Shoulder xray 8/22 showed Periprosthetic fracture of the right humerus. Ortho surgeon from [facility] contacted  -hold off on shoulder PT for now  -arm in sling for stabilization  -will f/u with [facility] orthopedist

## 2018-08-24 NOTE — DISCHARGE NOTE ADULT - PROVIDER TOKENS
TOKEN:'[id number]:'[id number]' TOKEN:'[id number]:'[id number]:'[id number]'

## 2018-08-24 NOTE — SWALLOW FEES ASSESSMENT ADULT - COMMENTS
Pt seen by our service on 8/21/18 for modified barium swallow study which revealed silent penetration with large sips of thin liquid.  Pt recommended regular diet with thin liquids with thin liquids via small sips only.

## 2018-08-24 NOTE — PROGRESS NOTE ADULT - PROBLEM SELECTOR PLAN 3
Echo 8/20 demonstrated EF 65%, dilated left atrium, moderate to severe MR, pulmonary artery systolic pressure 50mmhg.  -c/w PO enalapril to 5 mg daily.   -lasix switched to 40mg PO one dose tonight and then 40mg PO qd starting ariana AM Echo 8/20 demonstrated EF 65%, dilated left atrium, moderate to severe MR, pulmonary artery systolic pressure 50mmhg.  -c/w PO enalapril to 5 mg daily.   -lasix 40mg PO qd

## 2018-08-24 NOTE — PROGRESS NOTE ADULT - PROBLEM SELECTOR PLAN 2
Resolved. Patient met 4/4 SIRS criteria - T 102, RR 25, , WBC 10.8 on admission. Lactate 1.3. RVP negative.  CT PE negative for PE but showed possible RUL consolidation.  - legionella urine antigen negative. Sepsis due to hospital acquired PNA.   -completed 7 day course vanc/zosyn  -continues to be afebrile  -continue to monitor WBCs  -starting nebulized saline for secretions   -aerobika device Resolved. Patient met 4/4 SIRS criteria - T 102, RR 25, , WBC 10.8 on admission. Lactate 1.3. RVP negative.  CT PE negative for PE but showed possible RUL consolidation.  - legionella urine antigen negative. Sepsis due to hospital acquired PNA.   -completed 7 day course vanc/zosyn  -continues to be afebrile  -continue to monitor WBCs  -c/w nebulized saline and mucinex for secretions   -aerobika device and chest PT

## 2018-08-24 NOTE — DISCHARGE NOTE ADULT - CARE PLAN
Principal Discharge DX:	Sepsis due to pneumonia  Secondary Diagnosis:	Acute respiratory failure with hypoxia  Secondary Diagnosis:	Reactive airway disease with acute exacerbation, unspecified asthma severity, unspecified whether persistent  Secondary Diagnosis:	Pulmonary hypertension  Secondary Diagnosis:	Mitral regurgitation  Assessment and plan of treatment:	You were found to have moderate to severe mitral regurgitation during this hospitalization. You were seen by the cardiothoracic team who stated that you should be medically optimized and should follow up as an outpatient. You were started on lasix which is a diuretic and enalapril. Please continue with these medications and follow up with Dr. Pacheco  Secondary Diagnosis:	Humeral fracture  Assessment and plan of treatment:	You were found to have a periprosthetic fracture of your Right humerus. Follow up with your orthopedist at [facility].  Secondary Diagnosis:	Parkinsons  Assessment and plan of treatment:	Continue with your home medications Principal Discharge DX:	Hospital acquired PNA  Goal:	Please make an appointment with Dr. Smith's office.  Assessment and plan of treatment:	You came to the hospital because you weren't breathing well. You were found to have pneumonia and treated with IV antibiotics. Your pneumonia has resolved. Please  Secondary Diagnosis:	Reactive airway disease with acute exacerbation, unspecified asthma severity, unspecified whether persistent  Goal:	Please make an appointment with Dr. Smith's office.  Assessment and plan of treatment:	In the hospital you were found to have reactive airway disease. Please make an appointment with Dr. Smith.  Secondary Diagnosis:	Mitral regurgitation  Assessment and plan of treatment:	Echo in the hospital showed that you have a heart murmur that is due to your history of rheumatic heart disease. You were evaluated by cardiology. Please make appointments with Dr. Delgado and Dr. Dee.  Secondary Diagnosis:	Humeral fracture  Assessment and plan of treatment:	You were found to have a periprosthetic fracture of your Right humerus. Follow up with your orthopedist at [facility].  Secondary Diagnosis:	Parkinsons  Assessment and plan of treatment:	Please continue your home medications.  Assessment and plan of treatment:	You were found to have a periprosthetic fracture of your Right humerus. Follow up with your orthopedist at [facility]. Principal Discharge DX:	Hospital acquired PNA  Goal:	Please make an appointment with Dr. Smith's office.  Assessment and plan of treatment:	You came to the hospital because you weren't breathing well. You were found to have pneumonia and treated with IV antibiotics. Your pneumonia has resolved. Please follow up with Dr. Smith.  Secondary Diagnosis:	Reactive airway disease with acute exacerbation, unspecified asthma severity, unspecified whether persistent  Goal:	Please make an appointment with Dr. Smith's office.  Assessment and plan of treatment:	In the hospital you were found to have reactive airway disease. Please make an appointment with Dr. Smith.  Secondary Diagnosis:	Mitral regurgitation  Assessment and plan of treatment:	Echo in the hospital showed that you have a heart murmur that is due to your history of rheumatic heart disease. You were evaluated by cardiology. Please make appointments with Dr. Delgado and Dr. Dee. Please weigh yourself every day. If you have greater than a 10lb weight increase please call Dr. Arechiga's office because you may need to restart lasix.  Secondary Diagnosis:	Humeral fracture  Assessment and plan of treatment:	You were found to have a periprosthetic fracture of your Right humerus. Follow up with your orthopedist at [facility].  Secondary Diagnosis:	Parkinsons  Assessment and plan of treatment:	Please continue your home medications.  Assessment and plan of treatment:	You were found to have a periprosthetic fracture of your Right humerus. Follow up with your orthopedist at [facility]. Principal Discharge DX:	Hospital acquired PNA  Goal:	Please make an appointment with Dr. Smith's office [phone] once you leave [facility].  Assessment and plan of treatment:	You came to the hospital because you weren't breathing well. You were found to have pneumonia and treated with IV antibiotics. Your pneumonia has resolved. Please follow up with Dr. Smith once you leave [facility].  Secondary Diagnosis:	Reactive airway disease with acute exacerbation, unspecified asthma severity, unspecified whether persistent  Goal:	Please make an appointment with Dr. Smith's office [phone] once you leave [facility].  Assessment and plan of treatment:	In the hospital you were found to have reactive airway disease. Please make an appointment with Dr. Smith once you leave [facility].  Secondary Diagnosis:	Mitral regurgitation  Assessment and plan of treatment:	Echo in the hospital showed that you have a heart murmur that is due to your history of rheumatic heart disease. You were evaluated by cardiology. Please go to your appointment with cardiologist Dr. Dee on September 10 at 1pm.  If you have greater than a 10lb weight increase please call Dr. Arechiga's office because you may need to restart lasix. Please set up an appointment with cardiothoracic surgeon Dr. Delgado in 2-3 weeks after you leave [facility]. His office number is [phone].  Secondary Diagnosis:	Humeral fracture  Assessment and plan of treatment:	You were found to have a periprosthetic fracture of your Right humerus. Follow up with your orthopedist at [facility].  Secondary Diagnosis:	Parkinsons  Goal:	Please follow up with your neurologist once you leave [facility].  Assessment and plan of treatment:	You have a history of Parkinson's disease. You had low blood pressure in the hospital that is likely due to your Parkinson's disease. We changed your Parkinson's medications around to help your symptoms. Please take 1.5 tablet carbidopa at 8am, noon and 430pm like you took the medication before your hospital stay. We stopped your long acting cinamet and selegiline that you take at night because we thought it might be contributing to your low blood pressure Please do not take long acting night time cinamet and seligiline anymore until you follow up with your neurologist.  Secondary Diagnosis:	Orthostatic hypotension  Goal:	Please follow up with your neurologist once you leave [facility].  Assessment and plan of treatment:	You were found to have low blood pressure in the hospital. Your low blood pressure is likely due to your Parkinson's disease. You were started on midodrine 5mg one tablet three times a day. Please continue to take midodrine as prescribed and follow up with your neurologist.

## 2018-08-24 NOTE — DISCHARGE NOTE ADULT - CARE PROVIDER_API CALL
John Delgado), Cardiology; Interventional Cardiology  [address]  [address] [place] [address]  Phone: [phone]  Fax: [phone]    Piper Smith), Critical Care Medicine; Pulmonary Disease  [address]  [address]  [place] [address]  Phone: [phone]  Fax: [phone] John Delgado), Cardiology; Interventional Cardiology  [address]  [address]th [place] [address]  Phone: [phone]  Fax: [phone]    Piper Smith), Critical Care Medicine; Pulmonary Disease  [address] [address]  Phone: [phone]  Fax: [phone]    Cherie Arechiga), Internal Medicine  [address] [id number]  Phone: [phone]  Fax: [phone]

## 2018-08-24 NOTE — PROGRESS NOTE ADULT - ASSESSMENT
72F [facility] Parkinson's Disease, R. shoulder surgery (5da at [facility]) who was at rehab facility and experience acute onset shortness of breath 2/2 [facility] PNA.

## 2018-08-24 NOTE — DISCHARGE NOTE ADULT - INSTRUCTIONS
Please weigh yourself every day. If you have greater than a 10lb weight increase please call Dr. Arechiga's office because you may need to restart lasix.

## 2018-08-24 NOTE — PROGRESS NOTE ADULT - SUBJECTIVE AND OBJECTIVE BOX
INTERVAL HPI/OVERNIGHT EVENTS:  Yesterday, pt was on 6L NC througout the day. Around 4:30pm, began having CP and SOB. ECG was ordered which showed no acute ST-T wave changes. Pt was placed back on HFNC with positive effect.    VITAL SIGNS:  T(F): 98.1 (08-24-18 @ 06:00)  HR: 84 (08-24-18 @ 05:26)  BP: 114/63 (08-24-18 @ 00:20)  RR: 20 (08-24-18 @ 05:26)  SpO2: 99% (08-24-18 @ 05:26)  Wt(kg): --    PHYSICAL EXAM:    Constitutional: WDWN, NAD; R arm in sling  HEENT: PERRL, EOMI, sclera non-icteric, neck supple, trachea midline, no masses, no JVD, MMM, good dentition  Respiratory: B/L Rales. No wheezing or rhonchi without accessory muscle use and no intercostal retractions  Cardiovascular: RRR, normal S1S2, no M/R/G  Gastrointestinal: soft, NTND, no masses palpable, BS normal  Extremities: Warm, well perfused, pulses equal bilateral upper and lower extremities, no edema, no clubbing. Capillary refill <2 sec  Neurological: AAOx3, CN Grossly intact  Skin: Normal temperature, warm, dry      MEDICATIONS  (STANDING):  ALBUTerol/ipratropium for Nebulization 3 milliLiter(s) Nebulizer every 4 hours  aspirin enteric coated 81 milliGRAM(s) Oral daily  atorvastatin 10 milliGRAM(s) Oral at bedtime  azithromycin   Tablet 500 milliGRAM(s) Oral <User Schedule>  buDESOnide   0.5 milliGRAM(s) Respule 0.5 milliGRAM(s) Inhalation every 12 hours  carbidopa/levodopa  25/100 1.5 Tablet(s) Oral <User Schedule>  carbidopa/levodopa CR 25/100 1 Tablet(s) Oral <User Schedule>  cholecalciferol 1000 Unit(s) Oral daily  cyanocobalamin 1000 MICROGram(s) Oral daily  enalapril 5 milliGRAM(s) Oral daily  furosemide    Tablet 40 milliGRAM(s) Oral daily  guaiFENesin ER 1200 milliGRAM(s) Oral every 12 hours  heparin  Injectable 5000 Unit(s) SubCutaneous every 8 hours  lidocaine   Patch 1 Patch Transdermal daily  predniSONE   Tablet 50 milliGRAM(s) Oral daily  primidone 50 milliGRAM(s) Oral <User Schedule>  QUEtiapine 25 milliGRAM(s) Oral <User Schedule>  selegiline Oral Tab/Cap 5 milliGRAM(s) Oral <User Schedule>  sertraline 100 milliGRAM(s) Oral <User Schedule>  sodium chloride 3%  Inhalation 3 milliLiter(s) Inhalation every 6 hours    MEDICATIONS  (PRN):  LORazepam   Injectable 0.5 milliGRAM(s) IV Push every 6 hours PRN Anxiety      Allergies    No Known Allergies    Intolerances        LABS:                        8.6    13.0  )-----------( 518      ( 24 Aug 2018 05:49 )             28.0     08-24    142  |  96  |  21  ----------------------------<  150<H>  4.3   |  31  |  0.67    Ca    8.8      24 Aug 2018 05:49  Phos  4.0     08-23  Mg     2.1     08-23            RADIOLOGY & ADDITIONAL TESTS:  Reviewed INTERVAL HPI/OVERNIGHT EVENTS:  Yesterday, pt was on 6L NC througout the day. Around 4:30pm, began having CP and SOB. ECG was ordered which showed no acute ST-T wave changes. Pt was placed back on HFNC with positive effect. Today AM pt has no acute complaints. She was on high flow overnight and is hoping to work with PT again today for walking and wants to try nasal cannula again.    VITAL SIGNS:  T(F): 98.1 (08-24-18 @ 06:00)  HR: 84 (08-24-18 @ 05:26)  BP: 114/63 (08-24-18 @ 00:20)  RR: 20 (08-24-18 @ 05:26)  SpO2: 99% (08-24-18 @ 05:26)  Wt(kg): --    PHYSICAL EXAM:    Constitutional: Pt is laying in bed in NAD with HFNC in place  HEENT: PERRL, EOMI, sclera non-icteric, neck supple, trachea midline, no masses, no JVD, MMM, good dentition  Respiratory: B/L Rales. No wheezing or rhonchi without accessory muscle use and no intercostal retractions  Cardiovascular: RRR, normal S1S2, no M/R/G  Gastrointestinal: soft, NTND, no masses palpable, BS normal  Extremities: Warm, well perfused, pulses equal bilateral upper and lower extremities, no edema, no clubbing. Capillary refill <2 sec  Neurological: AAOx3, CN Grossly intact  Skin: Normal temperature, warm, dry      MEDICATIONS  (STANDING):  ALBUTerol/ipratropium for Nebulization 3 milliLiter(s) Nebulizer every 4 hours  aspirin enteric coated 81 milliGRAM(s) Oral daily  atorvastatin 10 milliGRAM(s) Oral at bedtime  azithromycin   Tablet 500 milliGRAM(s) Oral <User Schedule>  buDESOnide   0.5 milliGRAM(s) Respule 0.5 milliGRAM(s) Inhalation every 12 hours  carbidopa/levodopa  25/100 1.5 Tablet(s) Oral <User Schedule>  carbidopa/levodopa CR 25/100 1 Tablet(s) Oral <User Schedule>  cholecalciferol 1000 Unit(s) Oral daily  cyanocobalamin 1000 MICROGram(s) Oral daily  enalapril 5 milliGRAM(s) Oral daily  furosemide    Tablet 40 milliGRAM(s) Oral daily  guaiFENesin ER 1200 milliGRAM(s) Oral every 12 hours  heparin  Injectable 5000 Unit(s) SubCutaneous every 8 hours  lidocaine   Patch 1 Patch Transdermal daily  predniSONE   Tablet 50 milliGRAM(s) Oral daily  primidone 50 milliGRAM(s) Oral <User Schedule>  QUEtiapine 25 milliGRAM(s) Oral <User Schedule>  selegiline Oral Tab/Cap 5 milliGRAM(s) Oral <User Schedule>  sertraline 100 milliGRAM(s) Oral <User Schedule>  sodium chloride 3%  Inhalation 3 milliLiter(s) Inhalation every 6 hours    MEDICATIONS  (PRN):  LORazepam   Injectable 0.5 milliGRAM(s) IV Push every 6 hours PRN Anxiety      Allergies    No Known Allergies    Intolerances        LABS:                        8.6    13.0  )-----------( 518      ( 24 Aug 2018 05:49 )             28.0     08-24    142  |  96  |  21  ----------------------------<  150<H>  4.3   |  31  |  0.67    Ca    8.8      24 Aug 2018 05:49  Phos  4.0     08-23  Mg     2.1     08-23            RADIOLOGY & ADDITIONAL TESTS:  Reviewed

## 2018-08-24 NOTE — SWALLOW FEES ASSESSMENT ADULT - SLP GENERAL OBSERVATIONS
Pt awake and alert, pleasant, followed commands & engaged in discourse. (+) nasal cannula for O2 support, in NAD.  Pt with occasional dry coughing bouts during this visit.  Pt's daughter and private aide at bedside, who reported pt to have coughing with po at times.

## 2018-08-24 NOTE — PROGRESS NOTE ADULT - PROBLEM SELECTOR PLAN 9
1) PCP Contacted on Admission: (Y/N) --> Name & Phone #:  2) Date of Contact with PCP:  3) PCP Contacted at Discharge: (Y/N)  4) Summary of Handoff Given to PCP:   5) Post-Discharge Appointment Date and Location:    Case and plan discussed with primary team F - No IVF at this time.  E - replete for K < 4 and Mg < 2  N - Passed barium swallow. c/w regular diet with thin liquids and small sips to drink    #Hypokalemia: RESOLVED patient found to be hypokalemic 3.3 on AM lab then repeat 5PM BMP with K+ of 3.0 despite repletion. Hypokalemia likely 2/2 lasix  -replete PRN  -continue to monitor     Dispo: [facility]    DVT PPx: SCDs

## 2018-08-24 NOTE — DISCHARGE NOTE ADULT - MEDICATION SUMMARY - MEDICATIONS TO TAKE
I will START or STAY ON the medications listed below when I get home from the hospital:    budesonide 0.5 mg/2 mL inhalation suspension  --  inhaled   -- Indication: For Reactive airway disease    aspirin 81 mg oral delayed release tablet  -- 1 tab(s) by mouth once a day  -- Indication: For Prophylaxis    primidone 50 mg oral tablet  -- 1 tab(s) by mouth   -- Indication: For Parkinsons    sertraline 100 mg oral tablet  -- 1 tab(s) by mouth   -- Indication: For Anxiety    atorvastatin 10 mg oral tablet  -- 1 tab(s) by mouth once a day (at bedtime)  -- Indication: For High cholesterol    carbidopa-levodopa 25 mg-100 mg oral tablet  -- 1.5 tab(s) by mouth   -- Indication: For Parkinsons    QUEtiapine 25 mg oral tablet  -- 1 tab(s) by mouth   -- Indication: For Anxiety    ipratropium-albuterol 0.5 mg-2.5 mg/3 mLinhalation solution  -- 3 milliliter(s) inhaled every 6 hours, As needed, Shortness of Breath and/or Wheezing  -- Indication: For Reactive airway disease    tiotropium-olodaterol 2.5 mcg-2.5 mcg/inh inhalation aerosol  --  inhaled   -- Indication: For Reactive airway disease    lidocaine 5% topical film  --  on skin   -- Indication: For Shoulder pain    guaiFENesin 1200 mg oral tablet, extended release  -- 1 tab(s) by mouth every 12 hours  -- Indication: For Reactive airway disease    azithromycin 500 mg oral tablet  -- 1 tab(s) by mouth   -- Indication: For Reactive airway disease    midodrine 5 mg oral tablet  -- 1 tab(s) by mouth every 8 hours  -- Indication: For Orthostatic hypotension     cholecalciferol oral tablet  -- 1000 unit(s) by mouth once a day  -- Indication: For vitamin    cyanocobalamin 1000 mcg oral tablet  -- 1 tab(s) by mouth once a day  -- Indication: For vitamin I will START or STAY ON the medications listed below when I get home from the hospital:    budesonide 0.5 mg/2 mL inhalation suspension  -- inhaled every 12 hours  -- Indication: For Reactive airway disease    aspirin 81 mg oral delayed release tablet  -- 1 tab(s) by mouth once a day  -- Indication: For Prophylaxis    primidone 50 mg oral tablet  -- 1 tab(s) by mouth once a day  -- Indication: For Parkinsons    sertraline 100 mg oral tablet  -- 1 tab(s) by mouth once a day at 8am  -- Indication: For Anxiety    atorvastatin 10 mg oral tablet  -- 1 tab(s) by mouth once a day (at bedtime)  -- Indication: For High cholesterol    carbidopa-levodopa 25 mg-100 mg oral tablet  -- 1.5 tab(s) by mouth 3 times a day. Please take at 8am, noon, and 4:30pm (total of 4.5 tabs per day)  -- Indication: For Parkinsons    QUEtiapine 25 mg oral tablet  -- 1 tab(s) by mouth once a day at 8am  -- Indication: For Anxiety    tiotropium-olodaterol 2.5 mcg-2.5 mcg/inh inhalation aerosol  -- 2 puff(s) inhaled once a day  -- Indication: For Reactive airway disease    ipratropium-albuterol 0.5 mg-2.5 mg/3 mLinhalation solution  -- 3 milliliter(s) inhaled every 6 hours, As needed, Shortness of Breath and/or Wheezing  -- Indication: For Reactive airway disease    lidocaine 5% topical film  --  on skin   -- Indication: For Shoulder pain    guaiFENesin 1200 mg oral tablet, extended release  -- 1 tab(s) by mouth every 12 hours  -- Indication: For Reactive airway disease    azithromycin 500 mg oral tablet  -- 1 tab(s) by mouth on Monday, Wednseday, Friday  -- Indication: For Reactive airway disease for antiinflammation benefit     midodrine 5 mg oral tablet  -- 1 tab(s) by mouth every 8 hours  -- Indication: For Orthostatic hypotension     cholecalciferol oral tablet  -- 1000 unit(s) by mouth once a day  -- Indication: For vitamin    cyanocobalamin 1000 mcg oral tablet  -- 1 tab(s) by mouth once a day  -- Indication: For vitamin I will START or STAY ON the medications listed below when I get home from the hospital:    budesonide 0.5 mg/2 mL inhalation suspension  -- inhaled every 12 hours  -- Indication: For Reactive airway disease    aspirin 81 mg oral delayed release tablet  -- 1 tab(s) by mouth once a day  -- Indication: For Prophylaxis    primidone 50 mg oral tablet  -- 1 tab(s) by mouth once a day  -- Indication: For Parkinsons    sertraline 100 mg oral tablet  -- 1 tab(s) by mouth once a day at 8am  -- Indication: For Anxiety    atorvastatin 10 mg oral tablet  -- 1 tab(s) by mouth once a day (at bedtime)  -- Indication: For High cholesterol    carbidopa-levodopa 25 mg-100 mg oral tablet  -- 1.5 tab(s) by mouth 3 times a day. Please take at 8am, noon, and 4:30pm (total of 4.5 tabs per day)  -- Indication: For Parkinsons    QUEtiapine 25 mg oral tablet  -- 1 tab(s) by mouth once a day at 10pm  -- Indication: For Sleep    tiotropium-olodaterol 2.5 mcg-2.5 mcg/inh inhalation aerosol  -- 2 puff(s) inhaled once a day  -- Indication: For Reactive airway disease    ipratropium-albuterol 0.5 mg-2.5 mg/3 mLinhalation solution  -- 3 milliliter(s) inhaled every 6 hours, As needed, Shortness of Breath and/or Wheezing  -- Indication: For Reactive airway disease    lidocaine 5% topical film  --  on skin   -- Indication: For Shoulder pain    guaiFENesin 1200 mg oral tablet, extended release  -- 1 tab(s) by mouth every 12 hours  -- Indication: For Reactive airway disease    azithromycin 500 mg oral tablet  -- 1 tab(s) by mouth on Monday, Wednseday, Friday  -- Indication: For Reactive airway disease for antiinflammation benefit     midodrine 5 mg oral tablet  -- 1 tab(s) by mouth every 8 hours  -- Indication: For Orthostatic hypotension     cholecalciferol oral tablet  -- 1000 unit(s) by mouth once a day  -- Indication: For vitamin    cyanocobalamin 1000 mcg oral tablet  -- 1 tab(s) by mouth once a day  -- Indication: For vitamin

## 2018-08-24 NOTE — DISCHARGE NOTE ADULT - CARE PROVIDERS DIRECT ADDRESSES
,[email or web address] Medical [email or web address]

## 2018-08-25 LAB
ANION GAP SERPL CALC-SCNC: 14 MMOL/L — SIGNIFICANT CHANGE UP (ref 5–17)
ANION GAP SERPL CALC-SCNC: 17 MMOL/L — SIGNIFICANT CHANGE UP (ref 5–17)
BUN SERPL-MCNC: 23 MG/DL — SIGNIFICANT CHANGE UP (ref 7–23)
BUN SERPL-MCNC: 25 MG/DL — HIGH (ref 7–23)
CALCIUM SERPL-MCNC: 8.7 MG/DL — SIGNIFICANT CHANGE UP (ref 8.4–10.5)
CALCIUM SERPL-MCNC: 8.8 MG/DL — SIGNIFICANT CHANGE UP (ref 8.4–10.5)
CHLORIDE SERPL-SCNC: 96 MMOL/L — SIGNIFICANT CHANGE UP (ref 96–108)
CHLORIDE SERPL-SCNC: 99 MMOL/L — SIGNIFICANT CHANGE UP (ref 96–108)
CO2 SERPL-SCNC: 28 MMOL/L — SIGNIFICANT CHANGE UP (ref 22–31)
CO2 SERPL-SCNC: 28 MMOL/L — SIGNIFICANT CHANGE UP (ref 22–31)
CREAT SERPL-MCNC: 0.75 MG/DL — SIGNIFICANT CHANGE UP (ref 0.5–1.3)
CREAT SERPL-MCNC: 0.78 MG/DL — SIGNIFICANT CHANGE UP (ref 0.5–1.3)
GLUCOSE SERPL-MCNC: 158 MG/DL — HIGH (ref 70–99)
GLUCOSE SERPL-MCNC: 94 MG/DL — SIGNIFICANT CHANGE UP (ref 70–99)
HCT VFR BLD CALC: 27.2 % — LOW (ref 34.5–45)
HGB BLD-MCNC: 8.3 G/DL — LOW (ref 11.5–15.5)
MCHC RBC-ENTMCNC: 28.6 PG — SIGNIFICANT CHANGE UP (ref 27–34)
MCHC RBC-ENTMCNC: 30.5 G/DL — LOW (ref 32–36)
MCV RBC AUTO: 93.8 FL — SIGNIFICANT CHANGE UP (ref 80–100)
PLATELET # BLD AUTO: 482 K/UL — HIGH (ref 150–400)
POTASSIUM SERPL-MCNC: 3.1 MMOL/L — LOW (ref 3.5–5.3)
POTASSIUM SERPL-MCNC: 3.8 MMOL/L — SIGNIFICANT CHANGE UP (ref 3.5–5.3)
POTASSIUM SERPL-SCNC: 3.1 MMOL/L — LOW (ref 3.5–5.3)
POTASSIUM SERPL-SCNC: 3.8 MMOL/L — SIGNIFICANT CHANGE UP (ref 3.5–5.3)
RBC # BLD: 2.9 M/UL — LOW (ref 3.8–5.2)
RBC # FLD: 14.9 % — SIGNIFICANT CHANGE UP (ref 10.3–16.9)
SODIUM SERPL-SCNC: 141 MMOL/L — SIGNIFICANT CHANGE UP (ref 135–145)
SODIUM SERPL-SCNC: 141 MMOL/L — SIGNIFICANT CHANGE UP (ref 135–145)
WBC # BLD: 11.6 K/UL — HIGH (ref 3.8–10.5)
WBC # FLD AUTO: 11.6 K/UL — HIGH (ref 3.8–10.5)

## 2018-08-25 PROCEDURE — 99232 SBSQ HOSP IP/OBS MODERATE 35: CPT | Mod: GC

## 2018-08-25 RX ORDER — POTASSIUM CHLORIDE 20 MEQ
40 PACKET (EA) ORAL EVERY 4 HOURS
Qty: 0 | Refills: 0 | Status: DISCONTINUED | OUTPATIENT
Start: 2018-08-25 | End: 2018-08-25

## 2018-08-25 RX ORDER — SODIUM CHLORIDE 9 MG/ML
500 INJECTION INTRAMUSCULAR; INTRAVENOUS; SUBCUTANEOUS ONCE
Qty: 0 | Refills: 0 | Status: COMPLETED | OUTPATIENT
Start: 2018-08-25 | End: 2018-08-25

## 2018-08-25 RX ORDER — ACETAMINOPHEN 500 MG
650 TABLET ORAL AT BEDTIME
Qty: 0 | Refills: 0 | Status: DISCONTINUED | OUTPATIENT
Start: 2018-08-25 | End: 2018-08-30

## 2018-08-25 RX ORDER — POTASSIUM CHLORIDE 20 MEQ
40 PACKET (EA) ORAL ONCE
Qty: 0 | Refills: 0 | Status: COMPLETED | OUTPATIENT
Start: 2018-08-25 | End: 2018-08-25

## 2018-08-25 RX ORDER — POTASSIUM CHLORIDE 20 MEQ
20 PACKET (EA) ORAL DAILY
Qty: 0 | Refills: 0 | Status: DISCONTINUED | OUTPATIENT
Start: 2018-08-26 | End: 2018-08-26

## 2018-08-25 RX ADMIN — SODIUM CHLORIDE 3 MILLILITER(S): 9 INJECTION INTRAMUSCULAR; INTRAVENOUS; SUBCUTANEOUS at 14:38

## 2018-08-25 RX ADMIN — HEPARIN SODIUM 5000 UNIT(S): 5000 INJECTION INTRAVENOUS; SUBCUTANEOUS at 06:32

## 2018-08-25 RX ADMIN — Medication 1200 MILLIGRAM(S): at 17:33

## 2018-08-25 RX ADMIN — CARBIDOPA AND LEVODOPA 1.5 TABLET(S): 25; 100 TABLET ORAL at 08:16

## 2018-08-25 RX ADMIN — QUETIAPINE FUMARATE 25 MILLIGRAM(S): 200 TABLET, FILM COATED ORAL at 22:15

## 2018-08-25 RX ADMIN — PREGABALIN 1000 MICROGRAM(S): 225 CAPSULE ORAL at 12:43

## 2018-08-25 RX ADMIN — Medication 1000 UNIT(S): at 12:43

## 2018-08-25 RX ADMIN — CARBIDOPA AND LEVODOPA 1 TABLET(S): 25; 100 TABLET ORAL at 22:15

## 2018-08-25 RX ADMIN — SODIUM CHLORIDE 3 MILLILITER(S): 9 INJECTION INTRAMUSCULAR; INTRAVENOUS; SUBCUTANEOUS at 18:52

## 2018-08-25 RX ADMIN — CARBIDOPA AND LEVODOPA 1.5 TABLET(S): 25; 100 TABLET ORAL at 12:47

## 2018-08-25 RX ADMIN — TIOTROPIUM BROMIDE AND OLODATEROL 2 PUFF(S): 3.124; 2.736 SPRAY, METERED RESPIRATORY (INHALATION) at 12:47

## 2018-08-25 RX ADMIN — Medication 40 MILLIEQUIVALENT(S): at 09:56

## 2018-08-25 RX ADMIN — Medication 40 MILLIGRAM(S): at 06:29

## 2018-08-25 RX ADMIN — Medication 0.5 MILLIGRAM(S): at 22:16

## 2018-08-25 RX ADMIN — SELEGILINE HYDROCHLORIDE 5 MILLIGRAM(S): 1.25 TABLET, ORALLY DISINTEGRATING ORAL at 08:02

## 2018-08-25 RX ADMIN — Medication 650 MILLIGRAM(S): at 23:30

## 2018-08-25 RX ADMIN — Medication 5 MILLIGRAM(S): at 06:32

## 2018-08-25 RX ADMIN — SODIUM CHLORIDE 3 MILLILITER(S): 9 INJECTION INTRAMUSCULAR; INTRAVENOUS; SUBCUTANEOUS at 07:00

## 2018-08-25 RX ADMIN — Medication 40 MILLIEQUIVALENT(S): at 12:43

## 2018-08-25 RX ADMIN — ATORVASTATIN CALCIUM 10 MILLIGRAM(S): 80 TABLET, FILM COATED ORAL at 22:08

## 2018-08-25 RX ADMIN — HEPARIN SODIUM 5000 UNIT(S): 5000 INJECTION INTRAVENOUS; SUBCUTANEOUS at 15:00

## 2018-08-25 RX ADMIN — Medication 650 MILLIGRAM(S): at 22:08

## 2018-08-25 RX ADMIN — PRIMIDONE 50 MILLIGRAM(S): 250 TABLET ORAL at 08:02

## 2018-08-25 RX ADMIN — Medication 650 MILLIGRAM(S): at 00:20

## 2018-08-25 RX ADMIN — Medication 0.5 MILLIGRAM(S): at 09:55

## 2018-08-25 RX ADMIN — SODIUM CHLORIDE 3 MILLILITER(S): 9 INJECTION INTRAMUSCULAR; INTRAVENOUS; SUBCUTANEOUS at 01:21

## 2018-08-25 RX ADMIN — Medication 50 MILLIGRAM(S): at 06:30

## 2018-08-25 RX ADMIN — SODIUM CHLORIDE 1000 MILLILITER(S): 9 INJECTION INTRAMUSCULAR; INTRAVENOUS; SUBCUTANEOUS at 01:14

## 2018-08-25 RX ADMIN — Medication 81 MILLIGRAM(S): at 12:43

## 2018-08-25 RX ADMIN — SERTRALINE 100 MILLIGRAM(S): 25 TABLET, FILM COATED ORAL at 08:02

## 2018-08-25 RX ADMIN — HEPARIN SODIUM 5000 UNIT(S): 5000 INJECTION INTRAVENOUS; SUBCUTANEOUS at 22:16

## 2018-08-25 RX ADMIN — CARBIDOPA AND LEVODOPA 1.5 TABLET(S): 25; 100 TABLET ORAL at 17:34

## 2018-08-25 RX ADMIN — Medication 1200 MILLIGRAM(S): at 06:29

## 2018-08-25 NOTE — PROGRESS NOTE ADULT - PROBLEM SELECTOR PLAN 6
pt had recent shoulder replacement prior to admission. Was complaining of severe R shoulder pain. Shoulder xray 8/22 showed Periprosthetic fracture of the right humerus. Ortho surgeon from [facility] contacted  -hold off on shoulder PT for now  -arm in sling for stabilization  -will f/u with [facility] orthopedist

## 2018-08-25 NOTE — PROGRESS NOTE ADULT - PROBLEM SELECTOR PLAN 3
Echo 8/20 demonstrated EF 65%, dilated left atrium, moderate to severe MR, pulmonary artery systolic pressure 50mmhg.  -c/w PO enalapril to 5 mg daily.   -lasix 40mg PO qd Echo 8/20 demonstrated EF 65%, dilated left atrium, moderate to severe MR, pulmonary artery systolic pressure 50mmhg.  -c/w PO enalapril to 5 mg daily.   -c/w lasix 40mg PO qd

## 2018-08-25 NOTE — PROGRESS NOTE ADULT - SUBJECTIVE AND OBJECTIVE BOX
INTERVAL HPI/OVERNIGHT EVENTS:  Patient was seen and examined at bedside. As per nurse and patient, no o/n events, patient resting comfortably. No complaints at this time. Patient denies: fever, chills, dizziness, weakness, HA, Changes in vision, CP, palpitations, SOB, cough, N/V/D/C, dysuria, changes in bowel movements, LE edema. ROS otherwise negative.    VITAL SIGNS:  T(F): 97.6 (08-25-18 @ 02:00)  HR: 72 (08-25-18 @ 05:36)  BP: 100/60 (08-25-18 @ 05:36)  RR: 17 (08-25-18 @ 05:36)  SpO2: 94% (08-25-18 @ 05:36)  Wt(kg): --    PHYSICAL EXAM:    Constitutional: WDWN, NAD  HEENT: PERRL, EOMI, sclera non-icteric, neck supple, trachea midline, no masses, no JVD, MMM, good dentition  Respiratory: CTA b/l, good air entry b/l, no wheezing, no rhonchi, no rales, without accessory muscle use and no intercostal retractions  Cardiovascular: RRR, normal S1S2, no M/R/G  Gastrointestinal: soft, NTND, no masses palpable, BS normal  Extremities: Warm, well perfused, pulses equal bilateral upper and lower extremities, no edema, no clubbing. Capillary refill <2 sec  Neurological: AAOx3, CN Grossly intact  Skin: Normal temperature, warm, dry    MEDICATIONS  (STANDING):  aspirin enteric coated 81 milliGRAM(s) Oral daily  atorvastatin 10 milliGRAM(s) Oral at bedtime  azithromycin   Tablet 500 milliGRAM(s) Oral <User Schedule>  buDESOnide   0.5 milliGRAM(s) Respule 0.5 milliGRAM(s) Inhalation every 12 hours  carbidopa/levodopa  25/100 1.5 Tablet(s) Oral <User Schedule>  carbidopa/levodopa CR 25/100 1 Tablet(s) Oral <User Schedule>  cholecalciferol 1000 Unit(s) Oral daily  cyanocobalamin 1000 MICROGram(s) Oral daily  enalapril 5 milliGRAM(s) Oral daily  furosemide    Tablet 40 milliGRAM(s) Oral daily  guaiFENesin ER 1200 milliGRAM(s) Oral every 12 hours  heparin  Injectable 5000 Unit(s) SubCutaneous every 8 hours  lidocaine   Patch 1 Patch Transdermal daily  predniSONE   Tablet 50 milliGRAM(s) Oral daily  primidone 50 milliGRAM(s) Oral <User Schedule>  QUEtiapine 25 milliGRAM(s) Oral <User Schedule>  selegiline Oral Tab/Cap 5 milliGRAM(s) Oral <User Schedule>  sertraline 100 milliGRAM(s) Oral <User Schedule>  sodium chloride 3%  Inhalation 3 milliLiter(s) Inhalation every 6 hours  tiotropium 2.5 MICROgram(s)/olodaterol 2.5 MICROgram(s) (STIOLTO) Inhaler 2 Puff(s) Inhalation daily    MEDICATIONS  (PRN):  acetaminophen    Suspension. 650 milliGRAM(s) Oral every 6 hours PRN Moderate Pain (4 - 6)  ALBUTerol/ipratropium for Nebulization 3 milliLiter(s) Nebulizer every 4 hours PRN Shortness of Breath and/or Wheezing  ALBUTerol/ipratropium for Nebulization. 3 milliLiter(s) Nebulizer once PRN Bronchospasm  LORazepam   Injectable 0.5 milliGRAM(s) IV Push every 6 hours PRN Anxiety      Allergies    No Known Allergies    Intolerances        LABS:                        8.6    13.0  )-----------( 518      ( 24 Aug 2018 05:49 )             28.0     08-24    142  |  96  |  21  ----------------------------<  150<H>  4.3   |  31  |  0.67    Ca    8.8      24 Aug 2018 05:49  Phos  4.0     08-23  Mg     2.1     08-23            RADIOLOGY & ADDITIONAL TESTS:  Reviewed INTERVAL HPI/OVERNIGHT EVENTS:  Patient was seen and examined at bedside. Was stable on 6L NC throughout yesterday day and night. Was found to be hypotensive to SBP 89 overnight and was given 500ml bolus of NS with positive effect. BP now 108/54. No acute complaints at this time.    VITAL SIGNS:  T(F): 97.6 (08-25-18 @ 02:00)  HR: 72 (08-25-18 @ 05:36)  BP: 100/60 (08-25-18 @ 05:36)  RR: 17 (08-25-18 @ 05:36)  SpO2: 94% (08-25-18 @ 05:36)  Wt(kg): --    PHYSICAL EXAM:    Constitutional: Pt is laying in bed in NAD on 6L NC  HEENT: PERRL, EOMI, sclera non-icteric, neck supple, trachea midline, no masses, no JVD, MMM, good dentition  Respiratory: No wheezing rales or rhonchi without accessory muscle use and no intercostal retractions  Cardiovascular: RRR, normal S1S2, no M/R/G  Gastrointestinal: soft, NTND, no masses palpable, BS normal  Extremities: Warm, well perfused, pulses equal bilateral upper and lower extremities, no edema, no clubbing. Capillary refill <2 sec  Neurological: AAOx3, CN Grossly intact  Skin: Normal temperature, warm, dry    MEDICATIONS  (STANDING):  aspirin enteric coated 81 milliGRAM(s) Oral daily  atorvastatin 10 milliGRAM(s) Oral at bedtime  azithromycin   Tablet 500 milliGRAM(s) Oral <User Schedule>  buDESOnide   0.5 milliGRAM(s) Respule 0.5 milliGRAM(s) Inhalation every 12 hours  carbidopa/levodopa  25/100 1.5 Tablet(s) Oral <User Schedule>  carbidopa/levodopa CR 25/100 1 Tablet(s) Oral <User Schedule>  cholecalciferol 1000 Unit(s) Oral daily  cyanocobalamin 1000 MICROGram(s) Oral daily  enalapril 5 milliGRAM(s) Oral daily  furosemide    Tablet 40 milliGRAM(s) Oral daily  guaiFENesin ER 1200 milliGRAM(s) Oral every 12 hours  heparin  Injectable 5000 Unit(s) SubCutaneous every 8 hours  lidocaine   Patch 1 Patch Transdermal daily  predniSONE   Tablet 50 milliGRAM(s) Oral daily  primidone 50 milliGRAM(s) Oral <User Schedule>  QUEtiapine 25 milliGRAM(s) Oral <User Schedule>  selegiline Oral Tab/Cap 5 milliGRAM(s) Oral <User Schedule>  sertraline 100 milliGRAM(s) Oral <User Schedule>  sodium chloride 3%  Inhalation 3 milliLiter(s) Inhalation every 6 hours  tiotropium 2.5 MICROgram(s)/olodaterol 2.5 MICROgram(s) (STIOLTO) Inhaler 2 Puff(s) Inhalation daily    MEDICATIONS  (PRN):  acetaminophen    Suspension. 650 milliGRAM(s) Oral every 6 hours PRN Moderate Pain (4 - 6)  ALBUTerol/ipratropium for Nebulization 3 milliLiter(s) Nebulizer every 4 hours PRN Shortness of Breath and/or Wheezing  ALBUTerol/ipratropium for Nebulization. 3 milliLiter(s) Nebulizer once PRN Bronchospasm  LORazepam   Injectable 0.5 milliGRAM(s) IV Push every 6 hours PRN Anxiety      Allergies    No Known Allergies    Intolerances        LABS:                        8.6    13.0  )-----------( 518      ( 24 Aug 2018 05:49 )             28.0     08-24    142  |  96  |  21  ----------------------------<  150<H>  4.3   |  31  |  0.67    Ca    8.8      24 Aug 2018 05:49  Phos  4.0     08-23  Mg     2.1     08-23            RADIOLOGY & ADDITIONAL TESTS:  Reviewed INTERVAL HPI/OVERNIGHT EVENTS:  Patient was seen and examined at bedside. Was stable on 6L NC throughout yesterday day and night. Was found to be hypotensive to SBP 89 overnight and was given 500ml bolus of NS with positive effect. BP now 108/54. No acute complaints at this time.    VITAL SIGNS:  T(F): 97.6 (08-25-18 @ 02:00)  HR: 72 (08-25-18 @ 05:36)  BP: 100/60 (08-25-18 @ 05:36)  RR: 17 (08-25-18 @ 05:36)  SpO2: 94% (08-25-18 @ 05:36)  Wt(kg): --    PHYSICAL EXAM:    Constitutional: Pt is laying in bed in NAD on 6L NC  HEENT: PERRL, EOMI, sclera non-icteric, neck supple, trachea midline, no masses, no JVD, MMM, good dentition  Respiratory: No wheezing rales or rhonchi without accessory muscle use and no intercostal retractions. Prolonged expiratory phase.  Cardiovascular: RRR, normal S1S2, no M/R/G  Gastrointestinal: soft, NTND, no masses palpable, BS normal  Extremities: Warm, well perfused, pulses equal bilateral upper and lower extremities, no edema, no clubbing. Capillary refill <2 sec  Neurological: AAOx3, CN Grossly intact  Skin: Normal temperature, warm, dry    MEDICATIONS  (STANDING):  aspirin enteric coated 81 milliGRAM(s) Oral daily  atorvastatin 10 milliGRAM(s) Oral at bedtime  azithromycin   Tablet 500 milliGRAM(s) Oral <User Schedule>  buDESOnide   0.5 milliGRAM(s) Respule 0.5 milliGRAM(s) Inhalation every 12 hours  carbidopa/levodopa  25/100 1.5 Tablet(s) Oral <User Schedule>  carbidopa/levodopa CR 25/100 1 Tablet(s) Oral <User Schedule>  cholecalciferol 1000 Unit(s) Oral daily  cyanocobalamin 1000 MICROGram(s) Oral daily  enalapril 5 milliGRAM(s) Oral daily  furosemide    Tablet 40 milliGRAM(s) Oral daily  guaiFENesin ER 1200 milliGRAM(s) Oral every 12 hours  heparin  Injectable 5000 Unit(s) SubCutaneous every 8 hours  lidocaine   Patch 1 Patch Transdermal daily  predniSONE   Tablet 50 milliGRAM(s) Oral daily  primidone 50 milliGRAM(s) Oral <User Schedule>  QUEtiapine 25 milliGRAM(s) Oral <User Schedule>  selegiline Oral Tab/Cap 5 milliGRAM(s) Oral <User Schedule>  sertraline 100 milliGRAM(s) Oral <User Schedule>  sodium chloride 3%  Inhalation 3 milliLiter(s) Inhalation every 6 hours  tiotropium 2.5 MICROgram(s)/olodaterol 2.5 MICROgram(s) (STIOLTO) Inhaler 2 Puff(s) Inhalation daily    MEDICATIONS  (PRN):  acetaminophen    Suspension. 650 milliGRAM(s) Oral every 6 hours PRN Moderate Pain (4 - 6)  ALBUTerol/ipratropium for Nebulization 3 milliLiter(s) Nebulizer every 4 hours PRN Shortness of Breath and/or Wheezing  ALBUTerol/ipratropium for Nebulization. 3 milliLiter(s) Nebulizer once PRN Bronchospasm  LORazepam   Injectable 0.5 milliGRAM(s) IV Push every 6 hours PRN Anxiety      Allergies    No Known Allergies    Intolerances        LABS:                        8.6    13.0  )-----------( 518      ( 24 Aug 2018 05:49 )             28.0     08-24    142  |  96  |  21  ----------------------------<  150<H>  4.3   |  31  |  0.67    Ca    8.8      24 Aug 2018 05:49  Phos  4.0     08-23  Mg     2.1     08-23            RADIOLOGY & ADDITIONAL TESTS:  Reviewed INTERVAL HPI/OVERNIGHT EVENTS:  Patient was seen and examined at bedside. Was stable on 6L NC throughout yesterday day and night. Was found to be hypotensive to SBP 89 overnight and was given 500ml bolus of NS with positive effect. BP now 108/54. No acute complaints at this time.    VITAL SIGNS:  T(F): 97.6 (08-25-18 @ 02:00)  HR: 72 (08-25-18 @ 05:36)  BP: 100/60 (08-25-18 @ 05:36)  RR: 17 (08-25-18 @ 05:36)  SpO2: 94% (08-25-18 @ 05:36)  Wt(kg): --    PHYSICAL EXAM:    Constitutional: Pt is laying in bed in NAD on 6L NC  HEENT: PERRL, EOMI, sclera non-icteric, neck supple, trachea midline, no masses, no JVD, MMM, good dentition  Respiratory: No wheezing rales or rhonchi without accessory muscle use and no intercostal retractions. Prolonged expiratory phase.  Cardiovascular: RRR, normal S1S2, no M/R/G  Gastrointestinal: soft, NTND, no masses palpable, BS normal  Extremities: Warm, well perfused, pulses equal bilateral upper and lower extremities, no edema, no clubbing. Capillary refill <2 sec  Neurological: AAOx3, CN Grossly intact  Skin: Normal temperature, warm, dry  Vasc: 2+ peripheral pulses  MSK: no joint swelling    MEDICATIONS  (STANDING):  aspirin enteric coated 81 milliGRAM(s) Oral daily  atorvastatin 10 milliGRAM(s) Oral at bedtime  azithromycin   Tablet 500 milliGRAM(s) Oral <User Schedule>  buDESOnide   0.5 milliGRAM(s) Respule 0.5 milliGRAM(s) Inhalation every 12 hours  carbidopa/levodopa  25/100 1.5 Tablet(s) Oral <User Schedule>  carbidopa/levodopa CR 25/100 1 Tablet(s) Oral <User Schedule>  cholecalciferol 1000 Unit(s) Oral daily  cyanocobalamin 1000 MICROGram(s) Oral daily  enalapril 5 milliGRAM(s) Oral daily  furosemide    Tablet 40 milliGRAM(s) Oral daily  guaiFENesin ER 1200 milliGRAM(s) Oral every 12 hours  heparin  Injectable 5000 Unit(s) SubCutaneous every 8 hours  lidocaine   Patch 1 Patch Transdermal daily  predniSONE   Tablet 50 milliGRAM(s) Oral daily  primidone 50 milliGRAM(s) Oral <User Schedule>  QUEtiapine 25 milliGRAM(s) Oral <User Schedule>  selegiline Oral Tab/Cap 5 milliGRAM(s) Oral <User Schedule>  sertraline 100 milliGRAM(s) Oral <User Schedule>  sodium chloride 3%  Inhalation 3 milliLiter(s) Inhalation every 6 hours  tiotropium 2.5 MICROgram(s)/olodaterol 2.5 MICROgram(s) (STIOLTO) Inhaler 2 Puff(s) Inhalation daily    MEDICATIONS  (PRN):  acetaminophen    Suspension. 650 milliGRAM(s) Oral every 6 hours PRN Moderate Pain (4 - 6)  ALBUTerol/ipratropium for Nebulization 3 milliLiter(s) Nebulizer every 4 hours PRN Shortness of Breath and/or Wheezing  ALBUTerol/ipratropium for Nebulization. 3 milliLiter(s) Nebulizer once PRN Bronchospasm  LORazepam   Injectable 0.5 milliGRAM(s) IV Push every 6 hours PRN Anxiety      Allergies    No Known Allergies    Intolerances        LABS:                        8.6    13.0  )-----------( 518      ( 24 Aug 2018 05:49 )             28.0     08-24    142  |  96  |  21  ----------------------------<  150<H>  4.3   |  31  |  0.67    Ca    8.8      24 Aug 2018 05:49  Phos  4.0     08-23  Mg     2.1     08-23            RADIOLOGY & ADDITIONAL TESTS:  Reviewed

## 2018-08-25 NOTE — PROGRESS NOTE ADULT - PROBLEM SELECTOR PLAN 1
Patient with productive green sputum cough, acute SOB, met 4/4 SIRS criteria on admission T 102, RR 25, , WBC 10.8. Was at [facility] 1 week before admission for shoulder replacement surgery. Initial concern for PE given acuity of symptoms but CT PE negative. CT demonstrated RUL consolidation consistent with hospital-acquired PNA.   -finished 7 day course of vanc/zosyn 8/22  -azythromycin dosed to MWF for anti-inflammatory benefit, atypical coverage not thought to be necessary at this point  -f/u CXR with improvement in pulmonary edema  -negative blood cultures to date  -negative legionella   -negative RVP

## 2018-08-25 NOTE — PROGRESS NOTE ADULT - PROBLEM SELECTOR PLAN 2
Resolved. Patient met 4/4 SIRS criteria - T 102, RR 25, , WBC 10.8 on admission. Lactate 1.3. RVP negative.  CT PE negative for PE but showed possible RUL consolidation.  - legionella urine antigen negative. Sepsis due to hospital acquired PNA.   -completed 7 day course vanc/zosyn  -continues to be afebrile  -continue to monitor WBCs  -c/w nebulized saline and mucinex for secretions   -aerobika device and chest PT

## 2018-08-25 NOTE — PROGRESS NOTE ADULT - PROBLEM SELECTOR PLAN 5
Pt was found to have mod-severe mitral regurg on echo  -c/w enalapril 5mg/day   -as per CT sx, will f/u as outpatient with Dr. Pacheco

## 2018-08-25 NOTE — PROGRESS NOTE ADULT - ASSESSMENT
72F [facility] Parkinson's Disease, R. shoulder surgery (7da at [facility]) who was at rehab facility and experience acute onset shortness of breath 2/2 HAP PNA. 72F [facility] Parkinson's Disease, R. shoulder surgery (7da at [facility]) who was at rehab facility and experience acute onset shortness of breath 2/2 HAP PNA and bronchospasm.

## 2018-08-25 NOTE — PROGRESS NOTE ADULT - PROBLEM SELECTOR PLAN 7
- c/w home meds. - c/w home meds.    #hypotension  - pt had episode of hypotension overnight with SBP 89. Was given 500cc bolus of NS. BP now 108/54 (baseline)  - continue to monitor

## 2018-08-25 NOTE — PROGRESS NOTE ADULT - PROBLEM SELECTOR PLAN 4
Patient requiring intermittent BiPAP for acute SOB, acute wheezing, and respiratory distress. Patient receives solumedrol, duonebs, placed on BiPAP, and ativan 0.25mg as seems to be related to anxiety. HAP with b/l pleural effusions also likely contributing. Possibly undiagnosed asthma exacerbation  CXR chest demonstrates right upper lobe regions of patchy consolidation and interstitial pulmonary edema throughout the bilateral upper lobes and bilateral lower lobes. Low lung volumes. Normal heart size. Small layering persistent bilateral pleural effusions. Reversed right shoulder arthroplasty.  -Patient improving toady, tolerated NC @5-6L O2 well today  -c/w duonebs q4hrs  -c/w PO prednisone 50 PO/day through 8/27  -starting stiolto and c/w pulmicort 0.5mg BID  -inhaler teaching  -c/w BiPAP PRN  -c/w azithromycin MWF  -c/w nebulized saline and mucinex for secretions   -aerobika device and chest PT Patient requiring intermittent BiPAP for acute SOB, acute wheezing, and respiratory distress. Patient receives solumedrol, duonebs, placed on BiPAP, and ativan 0.25mg as seems to be related to anxiety. HAP with b/l pleural effusions also likely contributing. Possibly undiagnosed asthma exacerbation  CXR chest demonstrates right upper lobe regions of patchy consolidation and interstitial pulmonary edema throughout the bilateral upper lobes and bilateral lower lobes. Low lung volumes. Normal heart size. Small layering persistent bilateral pleural effusions. Reversed right shoulder arthroplasty.  -Patient improving toady, tolerated NC @5-6L O2 well today  -duonebs now PRN from standing  -c/w PO prednisone 50 PO/day through 8/27  -c/w stiolto and pulmicort 0.5mg BID  -inhaler teaching  -c/w BiPAP PRN  -c/w azithromycin MWF  -c/w nebulized saline and mucinex for secretions   -aerobika device and chest PT

## 2018-08-25 NOTE — PROGRESS NOTE ADULT - PROBLEM SELECTOR PLAN 9
F - No IVF at this time.  E - replete for K < 4 and Mg < 2  N - Passed barium swallow. c/w regular diet with thin liquids and small sips to drink    #Hypokalemia: RESOLVED patient found to be hypokalemic 3.3 on AM lab then repeat 5PM BMP with K+ of 3.0 despite repletion. Hypokalemia likely 2/2 lasix  -replete PRN  -continue to monitor     Dispo: [facility]    DVT PPx: [facility] F - No IVF at this time.  E - replete for K < 4 and Mg < 2  N - Passed barium swallow. c/w regular diet with thin liquids and small sips to drink    #Hypokalemia: patient found to be hypokalemic 3.1 on AM lab. Repletion of 80mg K+ orally. Will recheck 2pm BMP. Hypokalemia likely 2/2 lasix  -replete PRN  -continue to monitor     Dispo: [facility]    DVT PPx: [facility] F - No IVF at this time.  E - replete for K < 4 and Mg < 2  N - Passed barium swallow. c/w regular diet with thin liquids and small sips to drink    #Hypokalemia: patient found to be hypokalemic 3.1 on AM lab. Repletion of 80mg K+ orally. Will recheck 2pm BMP. Hypokalemia likely 2/2 lasix.  -will start daily potassium 20mg  -continue to monitor     Dispo: [facility]    DVT PPx: [facility]

## 2018-08-26 LAB
ANION GAP SERPL CALC-SCNC: 14 MMOL/L — SIGNIFICANT CHANGE UP (ref 5–17)
BUN SERPL-MCNC: 24 MG/DL — HIGH (ref 7–23)
CALCIUM SERPL-MCNC: 8.4 MG/DL — SIGNIFICANT CHANGE UP (ref 8.4–10.5)
CHLORIDE SERPL-SCNC: 97 MMOL/L — SIGNIFICANT CHANGE UP (ref 96–108)
CO2 SERPL-SCNC: 27 MMOL/L — SIGNIFICANT CHANGE UP (ref 22–31)
CREAT SERPL-MCNC: 0.74 MG/DL — SIGNIFICANT CHANGE UP (ref 0.5–1.3)
GLUCOSE SERPL-MCNC: 95 MG/DL — SIGNIFICANT CHANGE UP (ref 70–99)
HCT VFR BLD CALC: 26.5 % — LOW (ref 34.5–45)
HGB BLD-MCNC: 8 G/DL — LOW (ref 11.5–15.5)
MAGNESIUM SERPL-MCNC: 1.7 MG/DL — SIGNIFICANT CHANGE UP (ref 1.6–2.6)
MCHC RBC-ENTMCNC: 28.5 PG — SIGNIFICANT CHANGE UP (ref 27–34)
MCHC RBC-ENTMCNC: 30.2 G/DL — LOW (ref 32–36)
MCV RBC AUTO: 94.3 FL — SIGNIFICANT CHANGE UP (ref 80–100)
PHOSPHATE SERPL-MCNC: 3.7 MG/DL — SIGNIFICANT CHANGE UP (ref 2.5–4.5)
PLATELET # BLD AUTO: 504 K/UL — HIGH (ref 150–400)
POTASSIUM SERPL-MCNC: 3.3 MMOL/L — LOW (ref 3.5–5.3)
POTASSIUM SERPL-SCNC: 3.3 MMOL/L — LOW (ref 3.5–5.3)
RBC # BLD: 2.81 M/UL — LOW (ref 3.8–5.2)
RBC # FLD: 15.4 % — SIGNIFICANT CHANGE UP (ref 10.3–16.9)
SODIUM SERPL-SCNC: 138 MMOL/L — SIGNIFICANT CHANGE UP (ref 135–145)
WBC # BLD: 10.4 K/UL — SIGNIFICANT CHANGE UP (ref 3.8–10.5)
WBC # FLD AUTO: 10.4 K/UL — SIGNIFICANT CHANGE UP (ref 3.8–10.5)

## 2018-08-26 PROCEDURE — 99232 SBSQ HOSP IP/OBS MODERATE 35: CPT | Mod: GC

## 2018-08-26 RX ORDER — MAGNESIUM SULFATE 500 MG/ML
1 VIAL (ML) INJECTION ONCE
Qty: 0 | Refills: 0 | Status: COMPLETED | OUTPATIENT
Start: 2018-08-26 | End: 2018-08-26

## 2018-08-26 RX ORDER — IPRATROPIUM/ALBUTEROL SULFATE 18-103MCG
3 AEROSOL WITH ADAPTER (GRAM) INHALATION EVERY 6 HOURS
Qty: 0 | Refills: 0 | Status: DISCONTINUED | OUTPATIENT
Start: 2018-08-26 | End: 2018-08-30

## 2018-08-26 RX ORDER — POTASSIUM CHLORIDE 20 MEQ
20 PACKET (EA) ORAL DAILY
Qty: 0 | Refills: 0 | Status: DISCONTINUED | OUTPATIENT
Start: 2018-08-27 | End: 2018-08-27

## 2018-08-26 RX ORDER — POTASSIUM CHLORIDE 20 MEQ
40 PACKET (EA) ORAL ONCE
Qty: 0 | Refills: 0 | Status: COMPLETED | OUTPATIENT
Start: 2018-08-26 | End: 2018-08-26

## 2018-08-26 RX ORDER — POTASSIUM CHLORIDE 20 MEQ
40 PACKET (EA) ORAL ONCE
Qty: 0 | Refills: 0 | Status: DISCONTINUED | OUTPATIENT
Start: 2018-08-26 | End: 2018-08-26

## 2018-08-26 RX ADMIN — Medication 3 MILLILITER(S): at 01:06

## 2018-08-26 RX ADMIN — CARBIDOPA AND LEVODOPA 1 TABLET(S): 25; 100 TABLET ORAL at 21:21

## 2018-08-26 RX ADMIN — SERTRALINE 100 MILLIGRAM(S): 25 TABLET, FILM COATED ORAL at 08:04

## 2018-08-26 RX ADMIN — SELEGILINE HYDROCHLORIDE 5 MILLIGRAM(S): 1.25 TABLET, ORALLY DISINTEGRATING ORAL at 08:04

## 2018-08-26 RX ADMIN — CARBIDOPA AND LEVODOPA 1.5 TABLET(S): 25; 100 TABLET ORAL at 16:13

## 2018-08-26 RX ADMIN — Medication 40 MILLIGRAM(S): at 06:16

## 2018-08-26 RX ADMIN — CARBIDOPA AND LEVODOPA 1.5 TABLET(S): 25; 100 TABLET ORAL at 08:03

## 2018-08-26 RX ADMIN — Medication 0.5 MILLIGRAM(S): at 09:56

## 2018-08-26 RX ADMIN — HEPARIN SODIUM 5000 UNIT(S): 5000 INJECTION INTRAVENOUS; SUBCUTANEOUS at 21:20

## 2018-08-26 RX ADMIN — SODIUM CHLORIDE 3 MILLILITER(S): 9 INJECTION INTRAMUSCULAR; INTRAVENOUS; SUBCUTANEOUS at 01:13

## 2018-08-26 RX ADMIN — Medication 1200 MILLIGRAM(S): at 17:20

## 2018-08-26 RX ADMIN — Medication 1200 MILLIGRAM(S): at 06:15

## 2018-08-26 RX ADMIN — PREGABALIN 1000 MICROGRAM(S): 225 CAPSULE ORAL at 11:40

## 2018-08-26 RX ADMIN — HEPARIN SODIUM 5000 UNIT(S): 5000 INJECTION INTRAVENOUS; SUBCUTANEOUS at 06:17

## 2018-08-26 RX ADMIN — CARBIDOPA AND LEVODOPA 1.5 TABLET(S): 25; 100 TABLET ORAL at 11:40

## 2018-08-26 RX ADMIN — SODIUM CHLORIDE 3 MILLILITER(S): 9 INJECTION INTRAMUSCULAR; INTRAVENOUS; SUBCUTANEOUS at 06:21

## 2018-08-26 RX ADMIN — QUETIAPINE FUMARATE 25 MILLIGRAM(S): 200 TABLET, FILM COATED ORAL at 21:20

## 2018-08-26 RX ADMIN — Medication 650 MILLIGRAM(S): at 22:56

## 2018-08-26 RX ADMIN — Medication 0.5 MILLIGRAM(S): at 21:20

## 2018-08-26 RX ADMIN — SODIUM CHLORIDE 3 MILLILITER(S): 9 INJECTION INTRAMUSCULAR; INTRAVENOUS; SUBCUTANEOUS at 14:23

## 2018-08-26 RX ADMIN — TIOTROPIUM BROMIDE AND OLODATEROL 2 PUFF(S): 3.124; 2.736 SPRAY, METERED RESPIRATORY (INHALATION) at 11:47

## 2018-08-26 RX ADMIN — LIDOCAINE 1 PATCH: 4 CREAM TOPICAL at 11:40

## 2018-08-26 RX ADMIN — PRIMIDONE 50 MILLIGRAM(S): 250 TABLET ORAL at 08:04

## 2018-08-26 RX ADMIN — ATORVASTATIN CALCIUM 10 MILLIGRAM(S): 80 TABLET, FILM COATED ORAL at 21:21

## 2018-08-26 RX ADMIN — Medication 1000 UNIT(S): at 11:40

## 2018-08-26 RX ADMIN — Medication 40 MILLIEQUIVALENT(S): at 07:37

## 2018-08-26 RX ADMIN — Medication 50 MILLIGRAM(S): at 06:14

## 2018-08-26 RX ADMIN — SODIUM CHLORIDE 3 MILLILITER(S): 9 INJECTION INTRAMUSCULAR; INTRAVENOUS; SUBCUTANEOUS at 18:58

## 2018-08-26 RX ADMIN — Medication 100 GRAM(S): at 07:27

## 2018-08-26 RX ADMIN — HEPARIN SODIUM 5000 UNIT(S): 5000 INJECTION INTRAVENOUS; SUBCUTANEOUS at 14:23

## 2018-08-26 RX ADMIN — Medication 650 MILLIGRAM(S): at 21:20

## 2018-08-26 RX ADMIN — Medication 81 MILLIGRAM(S): at 11:44

## 2018-08-26 RX ADMIN — Medication 5 MILLIGRAM(S): at 06:16

## 2018-08-26 NOTE — PROGRESS NOTE ADULT - SUBJECTIVE AND OBJECTIVE BOX
INTERVAL HPI/OVERNIGHT EVENTS:  Patient was seen and examined at bedside. Saturating 95% on 6LNC. Denies any SOB, CP, fever, change in urinary or bowel habits.        VITAL SIGNS:  T(F): 98.4 (08-26-18 @ 09:04)  HR: 88 (08-26-18 @ 08:11)  BP: 111/59 (08-26-18 @ 08:11)  RR: 20 (08-26-18 @ 10:34)  SpO2: 96% (08-26-18 @ 10:34)  Wt(kg): --    PHYSICAL EXAM:    Constitutional: NAD on 6L NC, comfortable, conversational  HEENT: PERRL, EOMI, sclera non-icteric, neck supple, trachea midline, no masses, no JVD, MMM, good dentition  Respiratory: No wheezing rales or rhonchi without accessory muscle use and no intercostal retractions. Prolonged expiratory phase.  Cardiovascular: RRR, normal S1S2, no M/R/G  Gastrointestinal: soft, NTND, no masses palpable, BS normal  Extremities: Warm, well perfused, pulses equal bilateral upper and lower extremities, no edema, no clubbing. Capillary refill <2 sec  Neurological: AAOx3, CN Grossly intact  Skin: Normal temperature, warm, dry  Vasc: 2+ peripheral pulses  MSK: no joint swelling    MEDICATIONS  (STANDING):  acetaminophen   Tablet. 650 milliGRAM(s) Oral at bedtime  aspirin enteric coated 81 milliGRAM(s) Oral daily  atorvastatin 10 milliGRAM(s) Oral at bedtime  azithromycin   Tablet 500 milliGRAM(s) Oral <User Schedule>  buDESOnide   0.5 milliGRAM(s) Respule 0.5 milliGRAM(s) Inhalation every 12 hours  carbidopa/levodopa  25/100 1.5 Tablet(s) Oral <User Schedule>  carbidopa/levodopa CR 25/100 1 Tablet(s) Oral <User Schedule>  cholecalciferol 1000 Unit(s) Oral daily  cyanocobalamin 1000 MICROGram(s) Oral daily  enalapril 5 milliGRAM(s) Oral daily  furosemide    Tablet 40 milliGRAM(s) Oral daily  guaiFENesin ER 1200 milliGRAM(s) Oral every 12 hours  heparin  Injectable 5000 Unit(s) SubCutaneous every 8 hours  lidocaine   Patch 1 Patch Transdermal daily  predniSONE   Tablet 50 milliGRAM(s) Oral daily  primidone 50 milliGRAM(s) Oral <User Schedule>  QUEtiapine 25 milliGRAM(s) Oral <User Schedule>  selegiline Oral Tab/Cap 5 milliGRAM(s) Oral <User Schedule>  sertraline 100 milliGRAM(s) Oral <User Schedule>  sodium chloride 3%  Inhalation 3 milliLiter(s) Inhalation every 6 hours  tiotropium 2.5 MICROgram(s)/olodaterol 2.5 MICROgram(s) (STIOLTO) Inhaler 2 Puff(s) Inhalation daily    MEDICATIONS  (PRN):  ALBUTerol/ipratropium for Nebulization 3 milliLiter(s) Nebulizer every 6 hours PRN Shortness of Breath and/or Wheezing  LORazepam   Injectable 0.5 milliGRAM(s) IV Push every 6 hours PRN Anxiety      Allergies    No Known Allergies    Intolerances        LABS:                        8.0    10.4  )-----------( 504      ( 26 Aug 2018 05:59 )             26.5     08-26    138  |  97  |  24<H>  ----------------------------<  95  3.3<L>   |  27  |  0.74    Ca    8.4      26 Aug 2018 05:59  Phos  3.7     08-26  Mg     1.7     08-26            RADIOLOGY & ADDITIONAL TESTS:  Reviewed INTERVAL HPI/OVERNIGHT EVENTS:  Patient was seen and examined at bedside. Overnight patient desaturated to 87-88%, given duonebs and returned to high 90%.  Saturating 95% on 6LNC. Denies any SOB, CP, fever, change in urinary or bowel habits.    Switched duonebs from q4 to q6 PRN. Potassium K 3.3 today, will give 40meQ K today and start 20meQ po daily in setting of lasix.    VITAL SIGNS:  T(F): 98.4 (08-26-18 @ 09:04)  HR: 88 (08-26-18 @ 08:11)  BP: 111/59 (08-26-18 @ 08:11)  RR: 20 (08-26-18 @ 10:34)  SpO2: 96% (08-26-18 @ 10:34)  Wt(kg): --    PHYSICAL EXAM:    Constitutional: NAD on 6L NC, comfortable, conversational  HEENT: PERRL, EOMI, sclera non-icteric, neck supple, trachea midline, no masses, no JVD, MMM, good dentition  Respiratory: No wheezing rales or rhonchi without accessory muscle use and no intercostal retractions. Prolonged expiratory phase.  Cardiovascular: RRR, normal S1S2, no M/R/G  Gastrointestinal: soft, NTND, no masses palpable, BS normal  Extremities: Warm, well perfused, pulses equal bilateral upper and lower extremities, no edema, no clubbing. Capillary refill <2 sec  Neurological: AAOx3, CN Grossly intact  Skin: Normal temperature, warm, dry  Vasc: 2+ peripheral pulses  MSK: no joint swelling    MEDICATIONS  (STANDING):  acetaminophen   Tablet. 650 milliGRAM(s) Oral at bedtime  aspirin enteric coated 81 milliGRAM(s) Oral daily  atorvastatin 10 milliGRAM(s) Oral at bedtime  azithromycin   Tablet 500 milliGRAM(s) Oral <User Schedule>  buDESOnide   0.5 milliGRAM(s) Respule 0.5 milliGRAM(s) Inhalation every 12 hours  carbidopa/levodopa  25/100 1.5 Tablet(s) Oral <User Schedule>  carbidopa/levodopa CR 25/100 1 Tablet(s) Oral <User Schedule>  cholecalciferol 1000 Unit(s) Oral daily  cyanocobalamin 1000 MICROGram(s) Oral daily  enalapril 5 milliGRAM(s) Oral daily  furosemide    Tablet 40 milliGRAM(s) Oral daily  guaiFENesin ER 1200 milliGRAM(s) Oral every 12 hours  heparin  Injectable 5000 Unit(s) SubCutaneous every 8 hours  lidocaine   Patch 1 Patch Transdermal daily  predniSONE   Tablet 50 milliGRAM(s) Oral daily  primidone 50 milliGRAM(s) Oral <User Schedule>  QUEtiapine 25 milliGRAM(s) Oral <User Schedule>  selegiline Oral Tab/Cap 5 milliGRAM(s) Oral <User Schedule>  sertraline 100 milliGRAM(s) Oral <User Schedule>  sodium chloride 3%  Inhalation 3 milliLiter(s) Inhalation every 6 hours  tiotropium 2.5 MICROgram(s)/olodaterol 2.5 MICROgram(s) (STIOLTO) Inhaler 2 Puff(s) Inhalation daily    MEDICATIONS  (PRN):  ALBUTerol/ipratropium for Nebulization 3 milliLiter(s) Nebulizer every 6 hours PRN Shortness of Breath and/or Wheezing  LORazepam   Injectable 0.5 milliGRAM(s) IV Push every 6 hours PRN Anxiety      Allergies    No Known Allergies    Intolerances        LABS:                        8.0    10.4  )-----------( 504      ( 26 Aug 2018 05:59 )             26.5     08-26    138  |  97  |  24<H>  ----------------------------<  95  3.3<L>   |  27  |  0.74    Ca    8.4      26 Aug 2018 05:59  Phos  3.7     08-26  Mg     1.7     08-26            RADIOLOGY & ADDITIONAL TESTS:  Reviewed

## 2018-08-26 NOTE — PROGRESS NOTE ADULT - PROBLEM SELECTOR PLAN 4
Patient requiring intermittent BiPAP for acute SOB, acute wheezing, and respiratory distress. Patient receives solumedrol, duonebs, placed on BiPAP, and ativan 0.25mg as seems to be related to anxiety. HAP with b/l pleural effusions also likely contributing. Possibly undiagnosed asthma exacerbation  CXR chest demonstrates right upper lobe regions of patchy consolidation and interstitial pulmonary edema throughout the bilateral upper lobes and bilateral lower lobes. Low lung volumes. Normal heart size. Small layering persistent bilateral pleural effusions. Reversed right shoulder arthroplasty.  -Patient improving, tolerated NC @5-6L O2 well today  -duonebs now PRN from standing  -c/w PO prednisone 50 PO/day through 8/27  -c/w stiolto and pulmicort 0.5mg BID  -inhaler teaching  -c/w BiPAP PRN  -c/w azithromycin MWF  -c/w nebulized saline and mucinex for secretions   -aerobika device and chest PT

## 2018-08-26 NOTE — PROGRESS NOTE ADULT - ASSESSMENT
72F [facility] Parkinson's Disease, R. shoulder surgery (5da at [facility]) who was at rehab facility and experience acute onset shortness of breath 2/2 HAP PNA and bronchospasm.

## 2018-08-26 NOTE — PROGRESS NOTE ADULT - PROBLEM SELECTOR PLAN 6
pt had recent shoulder replacement prior to admission. Was complaining of severe R shoulder pain. Shoulder xray 8/22 showed Periprosthetic fracture of the right humerus. Ortho surgeon from [facility] contacted  -hold off on shoulder PT for now  -arm in sling for stabilization  -will f/u with [facility] orthopedist

## 2018-08-26 NOTE — PROGRESS NOTE ADULT - PROBLEM SELECTOR PLAN 3
Echo 8/20 demonstrated EF 65%, dilated left atrium, moderate to severe MR, pulmonary artery systolic pressure 50mmhg.  -c/w PO enalapril to 5 mg daily.   -c/w lasix 40mg PO qd

## 2018-08-26 NOTE — PROGRESS NOTE ADULT - PROBLEM SELECTOR PLAN 1
Patient with productive green sputum cough, acute SOB, met 4/4 SIRS criteria on admission T 102, RR 25, , WBC 10.8. Was at [facility] 1 week before admission for shoulder replacement surgery. Initial concern for PE given acuity of symptoms but CT PE negative. CT demonstrated RUL consolidation consistent with hospital-acquired PNA.   -finished 7 day course of vanc/zosyn 8/22  -azythromycin dosed to MWF for anti-inflammatory benefit, atypical coverage not thought to be necessary at this point  -f/u CXR with improvement in pulmonary edema  -negative blood cultures to date  -negative legionella   -negative RVP

## 2018-08-26 NOTE — PROGRESS NOTE ADULT - PROBLEM SELECTOR PLAN 9
F - No IVF at this time.  E - replete for K < 4 and Mg < 2  N - Passed barium swallow. c/w regular diet with thin liquids and small sips to drink    #Hypokalemia: patient found to be hypokalemic 3.1 on AM lab. Repletion of 80mg K+ orally. Will recheck 2pm BMP. Hypokalemia likely 2/2 lasix.  -will start daily potassium 20mg  -continue to monitor     Dispo: [facility]    DVT PPx: SCDs

## 2018-08-26 NOTE — PROGRESS NOTE ADULT - PROBLEM SELECTOR PLAN 7
- c/w home meds.    #hypotension  - pt had episode of hypotension overnight with SBP 89. Was given 500cc bolus of NS. BP now 108/54 (baseline)  - continue to monitor

## 2018-08-27 DIAGNOSIS — J45.901 UNSPECIFIED ASTHMA WITH (ACUTE) EXACERBATION: ICD-10-CM

## 2018-08-27 LAB
ANION GAP SERPL CALC-SCNC: 13 MMOL/L — SIGNIFICANT CHANGE UP (ref 5–17)
BASOPHILS NFR BLD AUTO: 0.1 % — SIGNIFICANT CHANGE UP (ref 0–2)
BUN SERPL-MCNC: 24 MG/DL — HIGH (ref 7–23)
CALCIUM SERPL-MCNC: 8.9 MG/DL — SIGNIFICANT CHANGE UP (ref 8.4–10.5)
CHLORIDE SERPL-SCNC: 97 MMOL/L — SIGNIFICANT CHANGE UP (ref 96–108)
CO2 SERPL-SCNC: 26 MMOL/L — SIGNIFICANT CHANGE UP (ref 22–31)
CREAT SERPL-MCNC: 0.79 MG/DL — SIGNIFICANT CHANGE UP (ref 0.5–1.3)
EOSINOPHIL NFR BLD AUTO: 2.2 % — SIGNIFICANT CHANGE UP (ref 0–6)
GLUCOSE SERPL-MCNC: 115 MG/DL — HIGH (ref 70–99)
HCT VFR BLD CALC: 27.8 % — LOW (ref 34.5–45)
HGB BLD-MCNC: 8.5 G/DL — LOW (ref 11.5–15.5)
LYMPHOCYTES # BLD AUTO: 9.7 % — LOW (ref 13–44)
MAGNESIUM SERPL-MCNC: 2.3 MG/DL — SIGNIFICANT CHANGE UP (ref 1.6–2.6)
MCHC RBC-ENTMCNC: 28.6 PG — SIGNIFICANT CHANGE UP (ref 27–34)
MCHC RBC-ENTMCNC: 30.6 G/DL — LOW (ref 32–36)
MCV RBC AUTO: 93.6 FL — SIGNIFICANT CHANGE UP (ref 80–100)
MONOCYTES NFR BLD AUTO: 6.1 % — SIGNIFICANT CHANGE UP (ref 2–14)
NEUTROPHILS NFR BLD AUTO: 81.9 % — HIGH (ref 43–77)
PLATELET # BLD AUTO: 490 K/UL — HIGH (ref 150–400)
POTASSIUM SERPL-MCNC: 3.6 MMOL/L — SIGNIFICANT CHANGE UP (ref 3.5–5.3)
POTASSIUM SERPL-SCNC: 3.6 MMOL/L — SIGNIFICANT CHANGE UP (ref 3.5–5.3)
RBC # BLD: 2.97 M/UL — LOW (ref 3.8–5.2)
RBC # FLD: 15 % — SIGNIFICANT CHANGE UP (ref 10.3–16.9)
SODIUM SERPL-SCNC: 136 MMOL/L — SIGNIFICANT CHANGE UP (ref 135–145)
WBC # BLD: 9.2 K/UL — SIGNIFICANT CHANGE UP (ref 3.8–10.5)
WBC # FLD AUTO: 9.2 K/UL — SIGNIFICANT CHANGE UP (ref 3.8–10.5)

## 2018-08-27 PROCEDURE — 99233 SBSQ HOSP IP/OBS HIGH 50: CPT | Mod: GC

## 2018-08-27 PROCEDURE — 99232 SBSQ HOSP IP/OBS MODERATE 35: CPT

## 2018-08-27 PROCEDURE — 99233 SBSQ HOSP IP/OBS HIGH 50: CPT

## 2018-08-27 RX ORDER — SODIUM CHLORIDE 9 MG/ML
500 INJECTION INTRAMUSCULAR; INTRAVENOUS; SUBCUTANEOUS ONCE
Qty: 0 | Refills: 0 | Status: COMPLETED | OUTPATIENT
Start: 2018-08-27 | End: 2018-08-27

## 2018-08-27 RX ORDER — POTASSIUM CHLORIDE 20 MEQ
40 PACKET (EA) ORAL DAILY
Qty: 0 | Refills: 0 | Status: DISCONTINUED | OUTPATIENT
Start: 2018-08-27 | End: 2018-08-27

## 2018-08-27 RX ORDER — POTASSIUM CHLORIDE 20 MEQ
40 PACKET (EA) ORAL DAILY
Qty: 0 | Refills: 0 | Status: DISCONTINUED | OUTPATIENT
Start: 2018-08-27 | End: 2018-08-30

## 2018-08-27 RX ADMIN — HEPARIN SODIUM 5000 UNIT(S): 5000 INJECTION INTRAVENOUS; SUBCUTANEOUS at 21:12

## 2018-08-27 RX ADMIN — Medication 40 MILLIGRAM(S): at 06:32

## 2018-08-27 RX ADMIN — Medication 1200 MILLIGRAM(S): at 18:42

## 2018-08-27 RX ADMIN — SODIUM CHLORIDE 2000 MILLILITER(S): 9 INJECTION INTRAMUSCULAR; INTRAVENOUS; SUBCUTANEOUS at 14:24

## 2018-08-27 RX ADMIN — PRIMIDONE 50 MILLIGRAM(S): 250 TABLET ORAL at 07:43

## 2018-08-27 RX ADMIN — SELEGILINE HYDROCHLORIDE 5 MILLIGRAM(S): 1.25 TABLET, ORALLY DISINTEGRATING ORAL at 07:43

## 2018-08-27 RX ADMIN — Medication 40 MILLIEQUIVALENT(S): at 13:40

## 2018-08-27 RX ADMIN — Medication 5 MILLIGRAM(S): at 06:32

## 2018-08-27 RX ADMIN — Medication 0.5 MILLIGRAM(S): at 09:24

## 2018-08-27 RX ADMIN — Medication 1000 UNIT(S): at 11:12

## 2018-08-27 RX ADMIN — AZITHROMYCIN 500 MILLIGRAM(S): 500 TABLET, FILM COATED ORAL at 09:09

## 2018-08-27 RX ADMIN — PREGABALIN 1000 MICROGRAM(S): 225 CAPSULE ORAL at 11:13

## 2018-08-27 RX ADMIN — CARBIDOPA AND LEVODOPA 1.5 TABLET(S): 25; 100 TABLET ORAL at 11:12

## 2018-08-27 RX ADMIN — CARBIDOPA AND LEVODOPA 1.5 TABLET(S): 25; 100 TABLET ORAL at 07:43

## 2018-08-27 RX ADMIN — HEPARIN SODIUM 5000 UNIT(S): 5000 INJECTION INTRAVENOUS; SUBCUTANEOUS at 06:32

## 2018-08-27 RX ADMIN — Medication 1200 MILLIGRAM(S): at 06:31

## 2018-08-27 RX ADMIN — Medication 0.5 MILLIGRAM(S): at 21:11

## 2018-08-27 RX ADMIN — SERTRALINE 100 MILLIGRAM(S): 25 TABLET, FILM COATED ORAL at 07:43

## 2018-08-27 RX ADMIN — QUETIAPINE FUMARATE 25 MILLIGRAM(S): 200 TABLET, FILM COATED ORAL at 21:11

## 2018-08-27 RX ADMIN — HEPARIN SODIUM 5000 UNIT(S): 5000 INJECTION INTRAVENOUS; SUBCUTANEOUS at 13:22

## 2018-08-27 RX ADMIN — ATORVASTATIN CALCIUM 10 MILLIGRAM(S): 80 TABLET, FILM COATED ORAL at 21:11

## 2018-08-27 RX ADMIN — CARBIDOPA AND LEVODOPA 1 TABLET(S): 25; 100 TABLET ORAL at 21:42

## 2018-08-27 RX ADMIN — SODIUM CHLORIDE 3 MILLILITER(S): 9 INJECTION INTRAMUSCULAR; INTRAVENOUS; SUBCUTANEOUS at 06:32

## 2018-08-27 RX ADMIN — CARBIDOPA AND LEVODOPA 1.5 TABLET(S): 25; 100 TABLET ORAL at 15:36

## 2018-08-27 RX ADMIN — TIOTROPIUM BROMIDE AND OLODATEROL 2 PUFF(S): 3.124; 2.736 SPRAY, METERED RESPIRATORY (INHALATION) at 11:13

## 2018-08-27 RX ADMIN — Medication 650 MILLIGRAM(S): at 21:11

## 2018-08-27 RX ADMIN — Medication 50 MILLIGRAM(S): at 06:31

## 2018-08-27 RX ADMIN — Medication 81 MILLIGRAM(S): at 11:15

## 2018-08-27 NOTE — PROGRESS NOTE ADULT - PROBLEM SELECTOR PLAN 4
Patient requiring intermittent BiPAP for acute SOB, acute wheezing, and respiratory distress. Patient receives solumedrol, duonebs, placed on BiPAP, and ativan 0.25mg as seems to be related to anxiety. HAP with b/l pleural effusions also likely contributing. Possibly undiagnosed asthma exacerbation  CXR chest demonstrates right upper lobe regions of patchy consolidation and interstitial pulmonary edema throughout the bilateral upper lobes and bilateral lower lobes. Low lung volumes. Normal heart size. Small layering persistent bilateral pleural effusions. Reversed right shoulder arthroplasty.  -Patient improving, tolerated NC @5-6L O2 well today  -duonebs now PRN from standing  -c/w PO prednisone 50 PO/day through 8/27  -c/w stiolto and pulmicort 0.5mg BID  -inhaler teaching  -c/w BiPAP PRN  -c/w azithromycin MWF  -c/w nebulized saline and mucinex for secretions   -aerobika device and chest PT Possible undiagnosed asthma vs COPD exacerbation  CXR chest demonstrates right upper lobe regions of patchy consolidation and interstitial pulmonary edema throughout the bilateral upper lobes and bilateral lower lobes. Low lung volumes. Normal heart size. Small layering persistent bilateral pleural effusions. Reversed right shoulder arthroplasty.  -Patient improving, tolerated NC @5-6L O2 well today  -duonebs now PRN from standing  -c/w PO prednisone 50 PO/day through 8/27  -c/w stiolto and pulmicort 0.5mg BID  -inhaler teaching  -c/w BiPAP PRN  -c/w azithromycin MWF  -c/w nebulized saline and mucinex for secretions   -aerobika device and chest PT Possible undiagnosed asthma vs COPD exacerbation  CXR chest demonstrated right upper lobe regions of patchy consolidation and interstitial pulmonary edema throughout the bilateral upper lobes and bilateral lower lobes  -Patient improving, tolerated NC @5-6L O2 well today  -duonebs now PRN from standing  -c/w PO prednisone 50 PO/day through 8/27  -c/w stiolto and pulmicort 0.5mg BID  -inhaler teaching  -c/w azithromycin MWF  -c/w mucinex for secretions   -aerobika device and chest PT

## 2018-08-27 NOTE — PROGRESS NOTE ADULT - PROBLEM SELECTOR PLAN 3
Echo 8/20 demonstrated EF 65%, dilated left atrium, moderate to severe MR, pulmonary artery systolic pressure 50mmhg.  -c/w PO enalapril to 5 mg daily.   -c/w lasix 40mg PO qd Echo 8/20 demonstrated EF 65%, dilated left atrium, moderate to severe MR, pulmonary artery systolic pressure 50mmhg.  -c/w PO enalapril to 5 mg daily.   -c/w lasix 40mg PO qd w/ 20mg PO potassium chloride qd

## 2018-08-27 NOTE — PROGRESS NOTE ADULT - SUBJECTIVE AND OBJECTIVE BOX
Patient is a 73y old  Female who presents with a chief complaint of Shortness of breath (24 Aug 2018 11:08)        INTERVAL HPI/OVERNIGHT EVENTS: went back on high flow last night, desat not docuemnted to 87%    SYMPTOMS better this am, still feels week, denies SOB    DRIPS none        ICU Vital Signs Last 24 Hrs  T(C): 36.5 (27 Aug 2018 09:45), Max: 37.1 (27 Aug 2018 05:34)  T(F): 97.7 (27 Aug 2018 09:45), Max: 98.7 (27 Aug 2018 05:34)  HR: 80 (27 Aug 2018 08:11) (70 - 86)  BP: 114/56 (27 Aug 2018 08:11) (98/55 - 114/56)  BP(mean): 79 (27 Aug 2018 08:11) (71 - 81)  ABP: --  ABP(mean): --  RR: 15 (27 Aug 2018 08:11) (14 - 20)  SpO2: 99% (27 Aug 2018 08:11) (95% - 100%)      I&O's Summary    26 Aug 2018 07:01  -  27 Aug 2018 07:00  --------------------------------------------------------  IN: 0 mL / OUT: 775 mL / NET: -775 mL    27 Aug 2018 07:01  -  27 Aug 2018 10:59  --------------------------------------------------------  IN: 0 mL / OUT: 1300 mL / NET: -1300 mL        EXAM    Chest few ronchi    Heart rr, LISSETTE ii/vi    Abdomen soft nontender with bs    Extremities wihtout edema    Neuro awkae, alert      LABS:                            8.5    9.2   )-----------( 490      ( 27 Aug 2018 06:03 )             27.8     08-27    136  |  97  |  24<H>  ----------------------------<  115<H>  3.6   |  26  |  0.79    Ca    8.9      27 Aug 2018 06:03  Phos  3.7     08-26  Mg     2.3     08-27                RADIOLOGY & ADDITIONAL STUDIES:    CRITICAL CARE TIME SPENT:

## 2018-08-27 NOTE — PROGRESS NOTE ADULT - PROBLEM SELECTOR PLAN 7
- c/w home meds.    #hypotension  - RESOLVED. Pt had episode of hypotension overnight 8/25 with SBP 89. Was given 500cc bolus of NS. BP now stable  - continue to monitor - c/w home meds.    #hypotension  - Pt had episode of hypotension overnight 8/25 with SBP 89. Was given 500cc bolus of NS and BP stabilized  -Today SBP was 90 and pt felt dizzy and weak. again given 500cc bolus and SBP stabilized.   - continue to monitor

## 2018-08-27 NOTE — PROGRESS NOTE ADULT - PROBLEM SELECTOR PLAN 10
1) PCP Contacted on Admission: (Y/N) --> Name & Phone #: Dr. Smith  2) Date of Contact with PCP:  3) PCP Contacted at Discharge: (Y/N)  4) Summary of Handoff Given to PCP:   5) Post-Discharge Appointment Date and Location:    Case and plan discussed with primary team

## 2018-08-27 NOTE — PROGRESS NOTE ADULT - PROBLEM SELECTOR PLAN 9
F - No IVF at this time.  E - replete for K < 4 and Mg < 2  N - Passed barium swallow. c/w regular diet with thin liquids and small sips to drink    #Hypokalemia: Likely 2/2 lasix. Patient continues to be hypokalemic despite 20mg PO potassium qd. Will increase to 40 today  - increased potassium to 40 PO daily  - continue to monitor w/ BMPs every other day    Dispo: [facility]    DVT PPx: SCDs

## 2018-08-27 NOTE — PROGRESS NOTE ADULT - ASSESSMENT
72F [facility] Parkinson's Disease, R. shoulder surgery (5da at [facility]) who was at rehab facility and experience acute onset shortness of breath 2/2 HAP PNA and bronchospasm. 72F [facility] Parkinson's Disease, R. shoulder surgery (5da at [facility]) who was at rehab facility and experience acute onset shortness of breath admitted for HAP PNA and reactive airway disease.

## 2018-08-27 NOTE — PROGRESS NOTE ADULT - PROBLEM SELECTOR PLAN 6
pt had recent shoulder replacement prior to admission. Was complaining of severe R shoulder pain. Shoulder xray 8/22 showed Periprosthetic fracture of the right humerus. Ortho surgeon from [facility] contacted  -hold off on shoulder PT for now  -arm in sling for stabilization  -will f/u with [facility] orthopedist

## 2018-08-27 NOTE — PROGRESS NOTE ADULT - SUBJECTIVE AND OBJECTIVE BOX
Transfer Acceptance Note: [facility] to [facility]  Hospital Course:  72F [facility] Parkinson's Disease, R. shoulder surgery 1 wk prior to admission who was at rehab facility and experienced acute onset shortness of breath for which she was brought to [facility]. She was tachypneic and hypoxic and was placed on BiPAP. Additionally, she met 4/4 SIRS criteria (Tmax 102.9R HR:115 RR:44, WBC) and was started on Vanc+Zosyn, steroids. ICU was consulted and pt was admitted to [facility] for acute hypoxic respiratory failure 2/2 HAP.     On [facility], pt was noted to have wheezing and respiratory distress likely 2/2 reactive airway disease. Steroids were continued and pt began nebulizers. Pt began improving clinically and was deescalated from BIPAP to HFNC and eventually to 6L NC with decreasing O2 requirements. Steroids were tapered (last dose 8/27). 8/23 pt was complaining of thick secretions and mucinex and chest PT were added with + effect. Inhalers were optimized with stiolto and pulmicort standing and nebulizers PRN. Of note, pt was complaining of continued shoulder pain 8/21 and shoulder xray was performed which showed periprosthetic fx of the R humerus. [facility] orthopedist was contacted and said NTD, rest w/o PT and f/u with him as an outpatient.    SUBJECTIVE / INTERVAL HPI: Patient seen and examined at bedside. Patient felt dizzy and lightheaded when stood up. Otherwise no complaints. Denies fever/chills. Has some CP with inspiration, no SOB, abdominal pain, n/v.  R shoulder in sling.     VITAL SIGNS:  Vital Signs Last 24 Hrs  T(C): 36.5 (27 Aug 2018 15:00), Max: 37.1 (27 Aug 2018 05:34)  T(F): 97.7 (27 Aug 2018 15:00), Max: 98.7 (27 Aug 2018 05:34)  HR: 78 (27 Aug 2018 15:00) (70 - 86)  BP: 100/62 (27 Aug 2018 15:00) (90/52 - 137/62)  BP(mean): 89 (27 Aug 2018 12:14) (74 - 89)  RR: 17 (27 Aug 2018 15:00) (14 - 20)  SpO2: 95% (27 Aug 2018 15:00) (94% - 100%)    PHYSICAL EXAM:    General: NAD  HEENT: NCAT; PERRL, anicteric sclera  Neck: supple, trachea midline  Cardiovascular: S1, S2 normal; RRR, no M/G/R  Respiratory: diffuse wheezes b/l.  bibasilar crackles.   Gastrointestinal: soft, nontender, nondistended. bowel sounds present.  Skin: no ulcerations or visible rashes appreciated  Extremities: WWP; no edema, clubbing or cyanosis  Vascular: 2+ radial, DP/PT pulses B/L  Neurological: AAOx3; no focal deficits    MEDICATIONS:  MEDICATIONS  (STANDING):  acetaminophen   Tablet. 650 milliGRAM(s) Oral at bedtime  aspirin enteric coated 81 milliGRAM(s) Oral daily  atorvastatin 10 milliGRAM(s) Oral at bedtime  azithromycin   Tablet 500 milliGRAM(s) Oral <User Schedule>  buDESOnide   0.5 milliGRAM(s) Respule 0.5 milliGRAM(s) Inhalation every 12 hours  carbidopa/levodopa  25/100 1.5 Tablet(s) Oral <User Schedule>  carbidopa/levodopa CR 25/100 1 Tablet(s) Oral <User Schedule>  cholecalciferol 1000 Unit(s) Oral daily  cyanocobalamin 1000 MICROGram(s) Oral daily  enalapril 5 milliGRAM(s) Oral daily  furosemide    Tablet 40 milliGRAM(s) Oral daily  guaiFENesin ER 1200 milliGRAM(s) Oral every 12 hours  heparin  Injectable 5000 Unit(s) SubCutaneous every 8 hours  lidocaine   Patch 1 Patch Transdermal daily  potassium chloride   Powder 40 milliEquivalent(s) Oral daily  primidone 50 milliGRAM(s) Oral <User Schedule>  QUEtiapine 25 milliGRAM(s) Oral <User Schedule>  selegiline Oral Tab/Cap 5 milliGRAM(s) Oral <User Schedule>  sertraline 100 milliGRAM(s) Oral <User Schedule>  tiotropium 2.5 MICROgram(s)/olodaterol 2.5 MICROgram(s) (STIOLTO) Inhaler 2 Puff(s) Inhalation daily    MEDICATIONS  (PRN):  ALBUTerol/ipratropium for Nebulization 3 milliLiter(s) Nebulizer every 6 hours PRN Shortness of Breath and/or Wheezing  LORazepam   Injectable 0.5 milliGRAM(s) IV Push every 6 hours PRN Anxiety      ALLERGIES:  Allergies    No Known Allergies    Intolerances        LABS:                        8.5    9.2   )-----------( 490      ( 27 Aug 2018 06:03 )             27.8     08-27    136  |  97  |  24<H>  ----------------------------<  115<H>  3.6   |  26  |  0.79    Ca    8.9      27 Aug 2018 06:03  Phos  3.7     08-26  Mg     2.3     08-27          CAPILLARY BLOOD GLUCOSE          RADIOLOGY & ADDITIONAL TESTS: Reviewed.

## 2018-08-27 NOTE — PROGRESS NOTE ADULT - ASSESSMENT
72F [facility] Parkinson's Disease, [facility] who was at rehab facility and experience acute onset shortness of breath admitted for HAP PNA and reactive airway disease.

## 2018-08-27 NOTE — PROGRESS NOTE ADULT - ASSESSMENT
A - obstructive  airways disease/ mitral regurgitation/respiratory failure/parkisonins/sp pneumonia    Suggest:  continue lasix, enalapril -no increase in doses  taper nc as tolerated  continue off ABX  last dose of steroids today  mobilize, RT to work with  no change in Parkinsons meds  consider transfer to floor

## 2018-08-27 NOTE — PROGRESS NOTE ADULT - PROBLEM SELECTOR PLAN 10
1) PCP Contacted on Admission: (Y/N) --> Name & Phone #:  2) Date of Contact with PCP:  3) PCP Contacted at Discharge: (Y/N)  4) Summary of Handoff Given to PCP:   5) Post-Discharge Appointment Date and Location:    Case and plan discussed with primary team 1) PCP Contacted on Admission: (Y/N) --> Name & Phone #: Dr. Smith  2) Date of Contact with PCP:  3) PCP Contacted at Discharge: (Y/N)  4) Summary of Handoff Given to PCP:   5) Post-Discharge Appointment Date and Location:    Case and plan discussed with primary team

## 2018-08-27 NOTE — PROGRESS NOTE ADULT - PROBLEM SELECTOR PLAN 9
F - No IVF at this time.  E - replete for K < 4 and Mg < 2  N - Passed barium swallow. c/w regular diet with thin liquids and small sips to drink    #Hypokalemia: patient found to be hypokalemic 3.1 on AM lab. Repletion of 80mg K+ orally. Will recheck 2pm BMP. Hypokalemia likely 2/2 lasix.  -will start daily potassium 20mg  -continue to monitor     Dispo: [facility]    DVT PPx: SCDs F - No IVF at this time.  E - replete for K < 4 and Mg < 2  N - Passed barium swallow. c/w regular diet with thin liquids and small sips to drink    #Hypokalemia: Likely 2/2 lasix. Patient continues to be hypokalemic despite 20mg PO potassium qd. Will increase to 40 today  - increased potassium to 40 PO daily  - continue to monitor w/ BMPs every other day    Dispo: [facility]    DVT PPx: SCDs

## 2018-08-27 NOTE — PROGRESS NOTE ADULT - PROBLEM SELECTOR PLAN 2
Resolved. Patient met 4/4 SIRS criteria - T 102, RR 25, , WBC 10.8 on admission. Lactate 1.3. RVP negative.  CT PE negative for PE but showed possible RUL consolidation.  - legionella urine antigen negative. Sepsis due to hospital acquired PNA.   -completed 7 day course vanc/zosyn  -continues to be afebrile  -continue to monitor WBCs  -c/w nebulized saline and mucinex for secretions   -aerobika device and chest PT Resolved. Patient met 4/4 SIRS criteria - T 102, RR 25, , WBC 10.8 on admission. Lactate 1.3. RVP negative.  CT PE negative for PE but showed possible RUL consolidation.  - legionella urine antigen negative. Sepsis due to hospital acquired PNA.   -completed 7 day course vanc/zosyn  -continues to be afebrile  -continue to monitor WBCs  -c/w mucinex for secretions   -aerobika device and chest PT

## 2018-08-27 NOTE — PROGRESS NOTE ADULT - SUBJECTIVE AND OBJECTIVE BOX
Interval Events:  Patient seen and examined at bedside.    Patient known to pulmonary service and to Dr. Smith who was the primary provider last week.     Overnight patient had a mild desaturation event to 87% while on nasal cannula and was placed briefly on HFNC for roughly an hour before transitioning back to NC on 3L. Diet was restricted. Patient continues to have frequent cough with sputum production, however that is improving. Has been afebrile. She complains today of feeling weaker than usual. no other complaints.     MEDICATIONS:  Pulmonary:  ALBUTerol/ipratropium for Nebulization 3 milliLiter(s) Nebulizer every 6 hours PRN  buDESOnide   0.5 milliGRAM(s) Respule 0.5 milliGRAM(s) Inhalation every 12 hours  guaiFENesin ER 1200 milliGRAM(s) Oral every 12 hours  sodium chloride 3%  Inhalation 3 milliLiter(s) Inhalation every 6 hours  tiotropium 2.5 MICROgram(s)/olodaterol 2.5 MICROgram(s) (STIOLTO) Inhaler 2 Puff(s) Inhalation daily    Antimicrobials:  azithromycin   Tablet 500 milliGRAM(s) Oral <User Schedule>    Anticoagulants:  aspirin enteric coated 81 milliGRAM(s) Oral daily  heparin  Injectable 5000 Unit(s) SubCutaneous every 8 hours    Cardiac:  enalapril 5 milliGRAM(s) Oral daily  furosemide    Tablet 40 milliGRAM(s) Oral daily    Endocrine:  atorvastatin 10 milliGRAM(s) Oral at bedtime    Allergies  No Known Allergies    Vital Signs Last 24 Hrs  T(C): 36.5 (27 Aug 2018 09:45), Max: 37.1 (27 Aug 2018 05:34)  T(F): 97.7 (27 Aug 2018 09:45), Max: 98.7 (27 Aug 2018 05:34)  HR: 80 (27 Aug 2018 08:11) (70 - 86)  BP: 114/56 (27 Aug 2018 08:11) (98/55 - 114/56)  BP(mean): 79 (27 Aug 2018 08:11) (71 - 81)  RR: 15 (27 Aug 2018 08:11) (14 - 20)  SpO2: 99% (27 Aug 2018 08:11) (95% - 100%)    08-26 @ 07:01  -  08-27 @ 07:00  --------------------------------------------------------  IN: 0 mL / OUT: 775 mL / NET: -775 mL    08-27 @ 07:01  -  08-27 @ 09:53  --------------------------------------------------------  IN: 0 mL / OUT: 1000 mL / NET: -1000 mL    Physical Exam:  General: NAD, AAO x3  HEENT: No icterus,. Moist mucous membranes  Neck: No JVD noted. Supple, no meningismus  Cardio: S1, S2 noted, RRR. No murmurs, rubs or gallops  Resp: Bibasilar crackles that improve with cough. scattered rhonchi throughout. no wheezing.   Abdo: Soft, NT, bowel sounds present. No organomegaly  Extremities: No edema noted. Pulses present b/l  Neuro: AAO x3, grossly normal motor strength.    LABS:  CBC Full  -  ( 27 Aug 2018 06:03 )  WBC Count : 9.2 K/uL  Hemoglobin : 8.5 g/dL  Hematocrit : 27.8 %  Platelet Count - Automated : 490 K/uL  Mean Cell Volume : 93.6 fL  Mean Cell Hemoglobin : 28.6 pg  Mean Cell Hemoglobin Concentration : 30.6 g/dL  Auto Neutrophil % : 81.9 %  Auto Lymphocyte % : 9.7 %  Auto Monocyte % : 6.1 %  Auto Eosinophil % : 2.2 %  Auto Basophil % : 0.1 %    08-27    136  |  97  |  24<H>  ----------------------------<  115<H>  3.6   |  26  |  0.79    Ca    8.9      27 Aug 2018 06:03  Phos  3.7     08-26  Mg     2.3     08-27    RADIOLOGY & ADDITIONAL STUDIES   Reviewed.

## 2018-08-27 NOTE — PROGRESS NOTE ADULT - PROBLEM SELECTOR PLAN 2
Resolved. Patient met 4/4 SIRS criteria - T 102, RR 25, , WBC 10.8 on admission. Lactate 1.3. RVP negative.  CT PE negative for PE but showed possible RUL consolidation.  - legionella urine antigen negative. Sepsis due to hospital acquired PNA.   -completed 7 day course vanc/zosyn  -continues to be afebrile  -continue to monitor WBCs  -c/w mucinex for secretions   -aerobika device and chest PT

## 2018-08-27 NOTE — PROGRESS NOTE ADULT - PROBLEM SELECTOR PLAN 2
Unclear if patient had underlying reactive airway disease. She was placed on short course of prednisone which finishes today.     Recommendations:  - F/u outpatient with Dr. Smith for formal PFT testing +/- methacholine challenge  - cont. Chad.   - Unclear benefit from triple inhaler therapy given uncertain diagnosis, however would continue for now.

## 2018-08-27 NOTE — PROGRESS NOTE ADULT - SUBJECTIVE AND OBJECTIVE BOX
INTERVAL HPI/OVERNIGHT EVENTS:  Patient was seen and examined at bedside. As per nurse and patient, no o/n events, patient resting comfortably. No complaints at this time. Patient denies: fever, chills, dizziness, weakness, HA, Changes in vision, CP, palpitations, SOB, cough, N/V/D/C, dysuria, changes in bowel movements, LE edema. ROS otherwise negative.    VITAL SIGNS:  T(F): 98.7 (08-27-18 @ 05:34)  HR: 78 (08-27-18 @ 06:44)  BP: 111/62 (08-27-18 @ 04:11)  RR: 14 (08-27-18 @ 06:44)  SpO2: 100% (08-27-18 @ 06:44)  Wt(kg): --    PHYSICAL EXAM:    Constitutional: Pt is laying in bed in NAD on 6L NC  HEENT: PERRL, EOMI, sclera non-icteric, neck supple, trachea midline, no masses, no JVD, MMM, good dentition  Respiratory: No wheezing rales or rhonchi without accessory muscle use and no intercostal retractions. Prolonged expiratory phase.  Cardiovascular: RRR, normal S1S2, no M/R/G  Gastrointestinal: soft, NTND, no masses palpable, BS normal  Extremities: Warm, well perfused, pulses equal bilateral upper and lower extremities, no edema, no clubbing. Capillary refill <2 sec  Neurological: AAOx3, CN Grossly intact  Skin: Normal temperature, warm, dry  Vasc: 2+ peripheral pulses  MSK: no joint swelling    MEDICATIONS  (STANDING):  acetaminophen   Tablet. 650 milliGRAM(s) Oral at bedtime  aspirin enteric coated 81 milliGRAM(s) Oral daily  atorvastatin 10 milliGRAM(s) Oral at bedtime  azithromycin   Tablet 500 milliGRAM(s) Oral <User Schedule>  buDESOnide   0.5 milliGRAM(s) Respule 0.5 milliGRAM(s) Inhalation every 12 hours  carbidopa/levodopa  25/100 1.5 Tablet(s) Oral <User Schedule>  carbidopa/levodopa CR 25/100 1 Tablet(s) Oral <User Schedule>  cholecalciferol 1000 Unit(s) Oral daily  cyanocobalamin 1000 MICROGram(s) Oral daily  enalapril 5 milliGRAM(s) Oral daily  furosemide    Tablet 40 milliGRAM(s) Oral daily  guaiFENesin ER 1200 milliGRAM(s) Oral every 12 hours  heparin  Injectable 5000 Unit(s) SubCutaneous every 8 hours  lidocaine   Patch 1 Patch Transdermal daily  potassium chloride    Tablet ER 20 milliEquivalent(s) Oral daily  primidone 50 milliGRAM(s) Oral <User Schedule>  QUEtiapine 25 milliGRAM(s) Oral <User Schedule>  selegiline Oral Tab/Cap 5 milliGRAM(s) Oral <User Schedule>  sertraline 100 milliGRAM(s) Oral <User Schedule>  sodium chloride 3%  Inhalation 3 milliLiter(s) Inhalation every 6 hours  tiotropium 2.5 MICROgram(s)/olodaterol 2.5 MICROgram(s) (STIOLTO) Inhaler 2 Puff(s) Inhalation daily    MEDICATIONS  (PRN):  ALBUTerol/ipratropium for Nebulization 3 milliLiter(s) Nebulizer every 6 hours PRN Shortness of Breath and/or Wheezing  LORazepam   Injectable 0.5 milliGRAM(s) IV Push every 6 hours PRN Anxiety      Allergies    No Known Allergies    Intolerances        LABS:                        8.5    9.2   )-----------( 490      ( 27 Aug 2018 06:03 )             27.8     08-27    136  |  97  |  24<H>  ----------------------------<  115<H>  3.6   |  26  |  0.79    Ca    8.9      27 Aug 2018 06:03  Phos  3.7     08-26  Mg     2.3     08-27            RADIOLOGY & ADDITIONAL TESTS:  Reviewed INTERVAL HPI/OVERNIGHT EVENTS:  Patient was seen and examined at bedside. Overnight pt was placed on HFNC for several hours but was returned to 5L NC today AM. She states that she is feeling better overall    VITAL SIGNS:  T(F): 98.7 (08-27-18 @ 05:34)  HR: 78 (08-27-18 @ 06:44)  BP: 111/62 (08-27-18 @ 04:11)  RR: 14 (08-27-18 @ 06:44)  SpO2: 100% (08-27-18 @ 06:44)  Wt(kg): --    PHYSICAL EXAM:    Constitutional: Pt is laying in bed in NAD on 5L NC  HEENT: PERRL, EOMI, sclera non-icteric, neck supple, trachea midline, no masses, no JVD, MMM, good dentition  Respiratory: No wheezing rales or rhonchi without accessory muscle use and no intercostal retractions. Prolonged expiratory phase.  Cardiovascular: RRR, normal S1S2, no M/R/G  Gastrointestinal: soft, NTND, no masses palpable, BS normal  Extremities: Warm, well perfused, pulses equal bilateral upper and lower extremities, no edema, no clubbing. Capillary refill <2 sec  Neurological: AAOx3, CN Grossly intact  Skin: Normal temperature, warm, dry  Vasc: 2+ peripheral pulses  MSK: no joint swelling    MEDICATIONS  (STANDING):  acetaminophen   Tablet. 650 milliGRAM(s) Oral at bedtime  aspirin enteric coated 81 milliGRAM(s) Oral daily  atorvastatin 10 milliGRAM(s) Oral at bedtime  azithromycin   Tablet 500 milliGRAM(s) Oral <User Schedule>  buDESOnide   0.5 milliGRAM(s) Respule 0.5 milliGRAM(s) Inhalation every 12 hours  carbidopa/levodopa  25/100 1.5 Tablet(s) Oral <User Schedule>  carbidopa/levodopa CR 25/100 1 Tablet(s) Oral <User Schedule>  cholecalciferol 1000 Unit(s) Oral daily  cyanocobalamin 1000 MICROGram(s) Oral daily  enalapril 5 milliGRAM(s) Oral daily  furosemide    Tablet 40 milliGRAM(s) Oral daily  guaiFENesin ER 1200 milliGRAM(s) Oral every 12 hours  heparin  Injectable 5000 Unit(s) SubCutaneous every 8 hours  lidocaine   Patch 1 Patch Transdermal daily  potassium chloride    Tablet ER 20 milliEquivalent(s) Oral daily  primidone 50 milliGRAM(s) Oral <User Schedule>  QUEtiapine 25 milliGRAM(s) Oral <User Schedule>  selegiline Oral Tab/Cap 5 milliGRAM(s) Oral <User Schedule>  sertraline 100 milliGRAM(s) Oral <User Schedule>  sodium chloride 3%  Inhalation 3 milliLiter(s) Inhalation every 6 hours  tiotropium 2.5 MICROgram(s)/olodaterol 2.5 MICROgram(s) (STIOLTO) Inhaler 2 Puff(s) Inhalation daily    MEDICATIONS  (PRN):  ALBUTerol/ipratropium for Nebulization 3 milliLiter(s) Nebulizer every 6 hours PRN Shortness of Breath and/or Wheezing  LORazepam   Injectable 0.5 milliGRAM(s) IV Push every 6 hours PRN Anxiety      Allergies    No Known Allergies    Intolerances        LABS:                        8.5    9.2   )-----------( 490      ( 27 Aug 2018 06:03 )             27.8     08-27    136  |  97  |  24<H>  ----------------------------<  115<H>  3.6   |  26  |  0.79    Ca    8.9      27 Aug 2018 06:03  Phos  3.7     08-26  Mg     2.3     08-27            RADIOLOGY & ADDITIONAL TESTS:  Reviewed INTERVAL HPI/OVERNIGHT EVENTS:  Patient was seen and examined at bedside. Overnight pt was placed on HFNC for several hours but was returned to 5L NC today AM. She states that she is feeling better overall    VITAL SIGNS:  T(F): 98.7 (08-27-18 @ 05:34)  HR: 78 (08-27-18 @ 06:44)  BP: 111/62 (08-27-18 @ 04:11)  RR: 14 (08-27-18 @ 06:44)  SpO2: 100% (08-27-18 @ 06:44)  Wt(kg): --    PHYSICAL EXAM:    Constitutional: Pt is laying in bed in NAD on NC w R arm in sling  HEENT: PERRL, EOMI, sclera non-icteric, neck supple, trachea midline, no masses, no JVD, MMM, good dentition  Respiratory: No wheezing rales or rhonchi without accessory muscle use and no intercostal retractions. Prolonged expiratory phase.  Cardiovascular: RRR, normal S1S2, no M/R/G  Gastrointestinal: soft, NTND, no masses palpable, BS normal  Extremities: Warm, well perfused, pulses equal bilateral upper and lower extremities, no edema, no clubbing. Capillary refill <2 sec  Neurological: AAOx3, CN Grossly intact  Skin: Normal temperature, warm, dry  Vasc: 2+ peripheral pulses  MSK: no joint swelling    MEDICATIONS  (STANDING):  acetaminophen   Tablet. 650 milliGRAM(s) Oral at bedtime  aspirin enteric coated 81 milliGRAM(s) Oral daily  atorvastatin 10 milliGRAM(s) Oral at bedtime  azithromycin   Tablet 500 milliGRAM(s) Oral <User Schedule>  buDESOnide   0.5 milliGRAM(s) Respule 0.5 milliGRAM(s) Inhalation every 12 hours  carbidopa/levodopa  25/100 1.5 Tablet(s) Oral <User Schedule>  carbidopa/levodopa CR 25/100 1 Tablet(s) Oral <User Schedule>  cholecalciferol 1000 Unit(s) Oral daily  cyanocobalamin 1000 MICROGram(s) Oral daily  enalapril 5 milliGRAM(s) Oral daily  furosemide    Tablet 40 milliGRAM(s) Oral daily  guaiFENesin ER 1200 milliGRAM(s) Oral every 12 hours  heparin  Injectable 5000 Unit(s) SubCutaneous every 8 hours  lidocaine   Patch 1 Patch Transdermal daily  potassium chloride    Tablet ER 20 milliEquivalent(s) Oral daily  primidone 50 milliGRAM(s) Oral <User Schedule>  QUEtiapine 25 milliGRAM(s) Oral <User Schedule>  selegiline Oral Tab/Cap 5 milliGRAM(s) Oral <User Schedule>  sertraline 100 milliGRAM(s) Oral <User Schedule>  sodium chloride 3%  Inhalation 3 milliLiter(s) Inhalation every 6 hours  tiotropium 2.5 MICROgram(s)/olodaterol 2.5 MICROgram(s) (STIOLTO) Inhaler 2 Puff(s) Inhalation daily    MEDICATIONS  (PRN):  ALBUTerol/ipratropium for Nebulization 3 milliLiter(s) Nebulizer every 6 hours PRN Shortness of Breath and/or Wheezing  LORazepam   Injectable 0.5 milliGRAM(s) IV Push every 6 hours PRN Anxiety      Allergies    No Known Allergies    Intolerances        LABS:                        8.5    9.2   )-----------( 490      ( 27 Aug 2018 06:03 )             27.8     08-27    136  |  97  |  24<H>  ----------------------------<  115<H>  3.6   |  26  |  0.79    Ca    8.9      27 Aug 2018 06:03  Phos  3.7     08-26  Mg     2.3     08-27            RADIOLOGY & ADDITIONAL TESTS:  Reviewed Hospital Course:  72F [facility] Parkinson's Disease, R. shoulder surgery 1 wk prior to admission who was at rehab facility and experienced acute onset shortness of breath for which she was brought to [facility]. She was tachypneic and hypoxic and was placed on BiPAP. Additionally, she met 4/4 SIRS criteria (Tmax 102.9R HR:115 RR:44, WBC) and was started on Vanc+Zosyn, steroids. ICU was consulted and pt was admitted to [facility] for acute hypoxic respiratory failure 2/2 HAP.     On [facility], pt was noted to have wheezing and respiratory distress likely 2/2 reactive airway disease. Steroids were continued and pt began nebulizers. Pt began improving clinically and was deescalated from BIPAP to HFNC and eventually to 6L NC with decreasing O2 requirements. Steroids were tapered (last dose 8/27). 8/23 pt was complaining of thick secretions and mucinex and chest PT were added with + effect. Of note, pt was complaining of continued shoulder pain 8/21 and shoulder xray was performed which showed periprosthetic fx of the R humerus. [facility] orthopedist was contacted     INTERVAL HPI/OVERNIGHT EVENTS:  Patient was seen and examined at bedside. Overnight pt was placed on HFNC for several hours but was returned to 5L NC today AM. She states that she is feeling better overall    VITAL SIGNS:  T(F): 98.7 (08-27-18 @ 05:34)  HR: 78 (08-27-18 @ 06:44)  BP: 111/62 (08-27-18 @ 04:11)  RR: 14 (08-27-18 @ 06:44)  SpO2: 100% (08-27-18 @ 06:44)  Wt(kg): --    PHYSICAL EXAM:    Constitutional: Pt is laying in bed in NAD on NC w R arm in sling  HEENT: PERRL, EOMI, sclera non-icteric, neck supple, trachea midline, no masses, no JVD, MMM, good dentition  Respiratory: No wheezing rales or rhonchi without accessory muscle use and no intercostal retractions. Prolonged expiratory phase.  Cardiovascular: RRR, normal S1S2, no M/R/G  Gastrointestinal: soft, NTND, no masses palpable, BS normal  Extremities: Warm, well perfused, pulses equal bilateral upper and lower extremities, no edema, no clubbing. Capillary refill <2 sec  Neurological: AAOx3, CN Grossly intact  Skin: Normal temperature, warm, dry  Vasc: 2+ peripheral pulses  MSK: no joint swelling    MEDICATIONS  (STANDING):  acetaminophen   Tablet. 650 milliGRAM(s) Oral at bedtime  aspirin enteric coated 81 milliGRAM(s) Oral daily  atorvastatin 10 milliGRAM(s) Oral at bedtime  azithromycin   Tablet 500 milliGRAM(s) Oral <User Schedule>  buDESOnide   0.5 milliGRAM(s) Respule 0.5 milliGRAM(s) Inhalation every 12 hours  carbidopa/levodopa  25/100 1.5 Tablet(s) Oral <User Schedule>  carbidopa/levodopa CR 25/100 1 Tablet(s) Oral <User Schedule>  cholecalciferol 1000 Unit(s) Oral daily  cyanocobalamin 1000 MICROGram(s) Oral daily  enalapril 5 milliGRAM(s) Oral daily  furosemide    Tablet 40 milliGRAM(s) Oral daily  guaiFENesin ER 1200 milliGRAM(s) Oral every 12 hours  heparin  Injectable 5000 Unit(s) SubCutaneous every 8 hours  lidocaine   Patch 1 Patch Transdermal daily  potassium chloride    Tablet ER 20 milliEquivalent(s) Oral daily  primidone 50 milliGRAM(s) Oral <User Schedule>  QUEtiapine 25 milliGRAM(s) Oral <User Schedule>  selegiline Oral Tab/Cap 5 milliGRAM(s) Oral <User Schedule>  sertraline 100 milliGRAM(s) Oral <User Schedule>  sodium chloride 3%  Inhalation 3 milliLiter(s) Inhalation every 6 hours  tiotropium 2.5 MICROgram(s)/olodaterol 2.5 MICROgram(s) (STIOLTO) Inhaler 2 Puff(s) Inhalation daily    MEDICATIONS  (PRN):  ALBUTerol/ipratropium for Nebulization 3 milliLiter(s) Nebulizer every 6 hours PRN Shortness of Breath and/or Wheezing  LORazepam   Injectable 0.5 milliGRAM(s) IV Push every 6 hours PRN Anxiety      Allergies    No Known Allergies    Intolerances        LABS:                        8.5    9.2   )-----------( 490      ( 27 Aug 2018 06:03 )             27.8     08-27    136  |  97  |  24<H>  ----------------------------<  115<H>  3.6   |  26  |  0.79    Ca    8.9      27 Aug 2018 06:03  Phos  3.7     08-26  Mg     2.3     08-27            RADIOLOGY & ADDITIONAL TESTS:  Reviewed Hospital Course:  72F [facility] Parkinson's Disease, R. shoulder surgery 1 wk prior to admission who was at rehab facility and experienced acute onset shortness of breath for which she was brought to [facility]. She was tachypneic and hypoxic and was placed on BiPAP. Additionally, she met 4/4 SIRS criteria (Tmax 102.9R HR:115 RR:44, WBC) and was started on Vanc+Zosyn, steroids. ICU was consulted and pt was admitted to [facility] for acute hypoxic respiratory failure 2/2 HAP.     On [facility], pt was noted to have wheezing and respiratory distress likely 2/2 reactive airway disease. Steroids were continued and pt began nebulizers. Pt began improving clinically and was deescalated from BIPAP to HFNC and eventually to 6L NC with decreasing O2 requirements. Steroids were tapered (last dose 8/27). 8/23 pt was complaining of thick secretions and mucinex and chest PT were added with + effect. Inhalers were optimized with stiolto and pulmicort standing and nebulizers PRN. Of note, pt was complaining of continued shoulder pain 8/21 and shoulder xray was performed which showed periprosthetic fx of the R humerus. [facility] orthopedist was contacted and said NTD, rest w/o PT and f/u with him as an outpatient.    INTERVAL HPI/OVERNIGHT EVENTS:  Patient was seen and examined at bedside. Overnight pt was placed on HFNC for several hours but was returned to 5L NC today AM. She states that she is feeling better overall    VITAL SIGNS:  T(F): 98.7 (08-27-18 @ 05:34)  HR: 78 (08-27-18 @ 06:44)  BP: 111/62 (08-27-18 @ 04:11)  RR: 14 (08-27-18 @ 06:44)  SpO2: 100% (08-27-18 @ 06:44)  Wt(kg): --    PHYSICAL EXAM:    Constitutional: Pt is laying in bed in NAD on NC w R arm in sling  HEENT: PERRL, EOMI, sclera non-icteric, neck supple, trachea midline, no masses, no JVD, MMM, good dentition  Respiratory: No wheezing rales or rhonchi without accessory muscle use and no intercostal retractions. Prolonged expiratory phase.  Cardiovascular: RRR, normal S1S2, no M/R/G  Gastrointestinal: soft, NTND, no masses palpable, BS normal  Extremities: Warm, well perfused, pulses equal bilateral upper and lower extremities, no edema, no clubbing. Capillary refill <2 sec  Neurological: AAOx3, CN Grossly intact  Skin: Normal temperature, warm, dry  Vasc: 2+ peripheral pulses  MSK: no joint swelling    MEDICATIONS  (STANDING):  acetaminophen   Tablet. 650 milliGRAM(s) Oral at bedtime  aspirin enteric coated 81 milliGRAM(s) Oral daily  atorvastatin 10 milliGRAM(s) Oral at bedtime  azithromycin   Tablet 500 milliGRAM(s) Oral <User Schedule>  buDESOnide   0.5 milliGRAM(s) Respule 0.5 milliGRAM(s) Inhalation every 12 hours  carbidopa/levodopa  25/100 1.5 Tablet(s) Oral <User Schedule>  carbidopa/levodopa CR 25/100 1 Tablet(s) Oral <User Schedule>  cholecalciferol 1000 Unit(s) Oral daily  cyanocobalamin 1000 MICROGram(s) Oral daily  enalapril 5 milliGRAM(s) Oral daily  furosemide    Tablet 40 milliGRAM(s) Oral daily  guaiFENesin ER 1200 milliGRAM(s) Oral every 12 hours  heparin  Injectable 5000 Unit(s) SubCutaneous every 8 hours  lidocaine   Patch 1 Patch Transdermal daily  potassium chloride    Tablet ER 20 milliEquivalent(s) Oral daily  primidone 50 milliGRAM(s) Oral <User Schedule>  QUEtiapine 25 milliGRAM(s) Oral <User Schedule>  selegiline Oral Tab/Cap 5 milliGRAM(s) Oral <User Schedule>  sertraline 100 milliGRAM(s) Oral <User Schedule>  sodium chloride 3%  Inhalation 3 milliLiter(s) Inhalation every 6 hours  tiotropium 2.5 MICROgram(s)/olodaterol 2.5 MICROgram(s) (STIOLTO) Inhaler 2 Puff(s) Inhalation daily    MEDICATIONS  (PRN):  ALBUTerol/ipratropium for Nebulization 3 milliLiter(s) Nebulizer every 6 hours PRN Shortness of Breath and/or Wheezing  LORazepam   Injectable 0.5 milliGRAM(s) IV Push every 6 hours PRN Anxiety      Allergies    No Known Allergies    Intolerances        LABS:                        8.5    9.2   )-----------( 490      ( 27 Aug 2018 06:03 )             27.8     08-27    136  |  97  |  24<H>  ----------------------------<  115<H>  3.6   |  26  |  0.79    Ca    8.9      27 Aug 2018 06:03  Phos  3.7     08-26  Mg     2.3     08-27            RADIOLOGY & ADDITIONAL TESTS:  Reviewed

## 2018-08-27 NOTE — PROGRESS NOTE ADULT - PROBLEM SELECTOR PLAN 7
- c/w home meds.    #hypotension  - pt had episode of hypotension overnight with SBP 89. Was given 500cc bolus of NS. BP now 108/54 (baseline)  - continue to monitor - c/w home meds.    #hypotension  - RESOLVED. Pt had episode of hypotension overnight 8/25 with SBP 89. Was given 500cc bolus of NS. BP now stable  - continue to monitor

## 2018-08-27 NOTE — PROGRESS NOTE ADULT - PROBLEM SELECTOR PLAN 3
Echo 8/20 demonstrated EF 65%, dilated left atrium, moderate to severe MR, pulmonary artery systolic pressure 50mmhg.  -c/w PO enalapril to 5 mg daily.   -c/w lasix 40mg PO qd w/ 20mg PO potassium chloride qd Echo 8/20 demonstrated EF 65%, dilated left atrium, moderate to severe MR, pulmonary artery systolic pressure 50mmhg.  -c/w PO enalapril to 5 mg daily.   -discontinue lasix 40mg PO qd

## 2018-08-27 NOTE — PROGRESS NOTE ADULT - ASSESSMENT
72 year old female with pmh of parkinson's disease who presented with severe pneumonia after recent orthopedic surgery. Now improving.

## 2018-08-27 NOTE — PROGRESS NOTE ADULT - PROBLEM SELECTOR PLAN 1
Patient with productive green sputum cough, acute SOB, met 4/4 SIRS criteria on admission T 102, RR 25, , WBC 10.8. Was at [facility] 1 week before admission for shoulder replacement surgery. Initial concern for PE given acuity of symptoms but CT PE negative. CT demonstrated RUL consolidation consistent with hospital-acquired PNA.   -finished 7 day course of vanc/zosyn 8/22  -azythromycin dosed to MWF for anti-inflammatory benefit, atypical coverage not thought to be necessary at this point  -f/u CXR with improvement in pulmonary edema  -negative blood cultures to date  -negative legionella   -negative RVP

## 2018-08-27 NOTE — PROGRESS NOTE ADULT - PROBLEM SELECTOR PLAN 4
Possible undiagnosed asthma vs COPD exacerbation  CXR chest demonstrated right upper lobe regions of patchy consolidation and interstitial pulmonary edema throughout the bilateral upper lobes and bilateral lower lobes  -Patient improving, tolerated NC @5-6L O2 well today  -duonebs now PRN from standing  -c/w PO prednisone 50 PO/day through 8/27  -c/w stiolto and pulmicort 0.5mg BID  -inhaler teaching  -c/w azithromycin MWF  -c/w mucinex for secretions   -aerobika device and chest PT

## 2018-08-27 NOTE — PROGRESS NOTE ADULT - PROBLEM SELECTOR PLAN 6
pt had recent shoulder replacement prior to admission. Was complaining of severe R shoulder pain. Shoulder xray 8/22 showed Periprosthetic fracture of the right humerus. Ortho surgeon from [facility] contacted  -hold off on shoulder PT for now  -arm in sling for stabilization  -will f/u with [facility] orthopedist

## 2018-08-27 NOTE — PROGRESS NOTE ADULT - PROBLEM SELECTOR PLAN 1
Severe Bilateral multilobar pneumonia. Now completed antibiotic course. Afebrile now. Continues to require oxygen although decreasing. Now stable on nasal cannula.   - Hypoxia likely primarily secondary to atelectasis at this point  - Cont. Incentive spirometer  - Cont. Areobika device  - OOB as tolerated  - PT as tolerated  - Agree with nebulized saline and Mucinex.

## 2018-08-28 DIAGNOSIS — I95.1 ORTHOSTATIC HYPOTENSION: ICD-10-CM

## 2018-08-28 DIAGNOSIS — J96.91 RESPIRATORY FAILURE, UNSPECIFIED WITH HYPOXIA: ICD-10-CM

## 2018-08-28 LAB
ANION GAP SERPL CALC-SCNC: 12 MMOL/L — SIGNIFICANT CHANGE UP (ref 5–17)
BASOPHILS NFR BLD AUTO: 0.2 % — SIGNIFICANT CHANGE UP (ref 0–2)
BUN SERPL-MCNC: 28 MG/DL — HIGH (ref 7–23)
CALCIUM SERPL-MCNC: 9.1 MG/DL — SIGNIFICANT CHANGE UP (ref 8.4–10.5)
CHLORIDE SERPL-SCNC: 101 MMOL/L — SIGNIFICANT CHANGE UP (ref 96–108)
CO2 SERPL-SCNC: 27 MMOL/L — SIGNIFICANT CHANGE UP (ref 22–31)
CREAT SERPL-MCNC: 0.93 MG/DL — SIGNIFICANT CHANGE UP (ref 0.5–1.3)
EOSINOPHIL NFR BLD AUTO: 4.2 % — SIGNIFICANT CHANGE UP (ref 0–6)
GLUCOSE SERPL-MCNC: 91 MG/DL — SIGNIFICANT CHANGE UP (ref 70–99)
HCT VFR BLD CALC: 30.5 % — LOW (ref 34.5–45)
HGB BLD-MCNC: 9.4 G/DL — LOW (ref 11.5–15.5)
LYMPHOCYTES # BLD AUTO: 17.7 % — SIGNIFICANT CHANGE UP (ref 13–44)
MAGNESIUM SERPL-MCNC: 2.2 MG/DL — SIGNIFICANT CHANGE UP (ref 1.6–2.6)
MCHC RBC-ENTMCNC: 29.2 PG — SIGNIFICANT CHANGE UP (ref 27–34)
MCHC RBC-ENTMCNC: 30.8 G/DL — LOW (ref 32–36)
MCV RBC AUTO: 94.7 FL — SIGNIFICANT CHANGE UP (ref 80–100)
MONOCYTES NFR BLD AUTO: 7.4 % — SIGNIFICANT CHANGE UP (ref 2–14)
NEUTROPHILS NFR BLD AUTO: 70.5 % — SIGNIFICANT CHANGE UP (ref 43–77)
PLATELET # BLD AUTO: 493 K/UL — HIGH (ref 150–400)
POTASSIUM SERPL-MCNC: 4.2 MMOL/L — SIGNIFICANT CHANGE UP (ref 3.5–5.3)
POTASSIUM SERPL-SCNC: 4.2 MMOL/L — SIGNIFICANT CHANGE UP (ref 3.5–5.3)
RBC # BLD: 3.22 M/UL — LOW (ref 3.8–5.2)
RBC # FLD: 15.2 % — SIGNIFICANT CHANGE UP (ref 10.3–16.9)
SODIUM SERPL-SCNC: 140 MMOL/L — SIGNIFICANT CHANGE UP (ref 135–145)
WBC # BLD: 9.1 K/UL — SIGNIFICANT CHANGE UP (ref 3.8–10.5)
WBC # FLD AUTO: 9.1 K/UL — SIGNIFICANT CHANGE UP (ref 3.8–10.5)

## 2018-08-28 PROCEDURE — 99222 1ST HOSP IP/OBS MODERATE 55: CPT

## 2018-08-28 PROCEDURE — 71045 X-RAY EXAM CHEST 1 VIEW: CPT | Mod: 26

## 2018-08-28 PROCEDURE — 99233 SBSQ HOSP IP/OBS HIGH 50: CPT | Mod: GC

## 2018-08-28 PROCEDURE — 99232 SBSQ HOSP IP/OBS MODERATE 35: CPT

## 2018-08-28 RX ORDER — SERTRALINE 25 MG/1
1 TABLET, FILM COATED ORAL
Qty: 0 | Refills: 0 | COMMUNITY
Start: 2018-08-28

## 2018-08-28 RX ORDER — PRIMIDONE 250 MG/1
1 TABLET ORAL
Qty: 0 | Refills: 0 | COMMUNITY
Start: 2018-08-28

## 2018-08-28 RX ORDER — QUETIAPINE FUMARATE 200 MG/1
1 TABLET, FILM COATED ORAL
Qty: 0 | Refills: 0 | COMMUNITY
Start: 2018-08-28

## 2018-08-28 RX ORDER — CARBIDOPA AND LEVODOPA 25; 100 MG/1; MG/1
1 TABLET ORAL
Qty: 0 | Refills: 0 | Status: DISCONTINUED | OUTPATIENT
Start: 2018-08-28 | End: 2018-08-29

## 2018-08-28 RX ORDER — SELEGILINE HYDROCHLORIDE 1.25 MG/1
1 TABLET, ORALLY DISINTEGRATING ORAL
Qty: 0 | Refills: 0 | COMMUNITY

## 2018-08-28 RX ORDER — ACETAMINOPHEN 500 MG
650 TABLET ORAL ONCE
Qty: 0 | Refills: 0 | Status: COMPLETED | OUTPATIENT
Start: 2018-08-28 | End: 2018-08-28

## 2018-08-28 RX ORDER — SELEGILINE HYDROCHLORIDE 1.25 MG/1
1 TABLET, ORALLY DISINTEGRATING ORAL
Qty: 0 | Refills: 0 | COMMUNITY
Start: 2018-08-28

## 2018-08-28 RX ADMIN — Medication 650 MILLIGRAM(S): at 21:46

## 2018-08-28 RX ADMIN — Medication 0.5 MILLIGRAM(S): at 10:02

## 2018-08-28 RX ADMIN — Medication 650 MILLIGRAM(S): at 14:50

## 2018-08-28 RX ADMIN — PREGABALIN 1000 MICROGRAM(S): 225 CAPSULE ORAL at 11:38

## 2018-08-28 RX ADMIN — SERTRALINE 100 MILLIGRAM(S): 25 TABLET, FILM COATED ORAL at 07:14

## 2018-08-28 RX ADMIN — HEPARIN SODIUM 5000 UNIT(S): 5000 INJECTION INTRAVENOUS; SUBCUTANEOUS at 05:52

## 2018-08-28 RX ADMIN — HEPARIN SODIUM 5000 UNIT(S): 5000 INJECTION INTRAVENOUS; SUBCUTANEOUS at 13:45

## 2018-08-28 RX ADMIN — Medication 1200 MILLIGRAM(S): at 18:21

## 2018-08-28 RX ADMIN — PRIMIDONE 50 MILLIGRAM(S): 250 TABLET ORAL at 07:14

## 2018-08-28 RX ADMIN — Medication 1200 MILLIGRAM(S): at 05:57

## 2018-08-28 RX ADMIN — SELEGILINE HYDROCHLORIDE 5 MILLIGRAM(S): 1.25 TABLET, ORALLY DISINTEGRATING ORAL at 07:15

## 2018-08-28 RX ADMIN — Medication 81 MILLIGRAM(S): at 11:38

## 2018-08-28 RX ADMIN — Medication 40 MILLIEQUIVALENT(S): at 11:43

## 2018-08-28 RX ADMIN — Medication 0.5 MILLIGRAM(S): at 21:16

## 2018-08-28 RX ADMIN — HEPARIN SODIUM 5000 UNIT(S): 5000 INJECTION INTRAVENOUS; SUBCUTANEOUS at 21:16

## 2018-08-28 RX ADMIN — CARBIDOPA AND LEVODOPA 1 TABLET(S): 25; 100 TABLET ORAL at 18:20

## 2018-08-28 RX ADMIN — Medication 650 MILLIGRAM(S): at 21:16

## 2018-08-28 RX ADMIN — Medication 5 MILLIGRAM(S): at 05:51

## 2018-08-28 RX ADMIN — ATORVASTATIN CALCIUM 10 MILLIGRAM(S): 80 TABLET, FILM COATED ORAL at 21:16

## 2018-08-28 RX ADMIN — Medication 650 MILLIGRAM(S): at 13:44

## 2018-08-28 RX ADMIN — CARBIDOPA AND LEVODOPA 1.5 TABLET(S): 25; 100 TABLET ORAL at 11:39

## 2018-08-28 RX ADMIN — Medication 1000 UNIT(S): at 11:38

## 2018-08-28 RX ADMIN — QUETIAPINE FUMARATE 25 MILLIGRAM(S): 200 TABLET, FILM COATED ORAL at 21:16

## 2018-08-28 RX ADMIN — CARBIDOPA AND LEVODOPA 1.5 TABLET(S): 25; 100 TABLET ORAL at 07:14

## 2018-08-28 RX ADMIN — TIOTROPIUM BROMIDE AND OLODATEROL 2 PUFF(S): 3.124; 2.736 SPRAY, METERED RESPIRATORY (INHALATION) at 11:37

## 2018-08-28 NOTE — CONSULT NOTE ADULT - SUBJECTIVE AND OBJECTIVE BOX
Vascular Neurology Consultation    Reason for consult:    HPI:  72F PMH Parkinson's Disease on sinemet x 2 years with baseline SBP in 100 -105 range s/p R. shoulder surgery (5da at [facility]) who was at rehab facility and experience acute onset shortness of breath for which she was brought to [facility]. In the ED she was found to be tachypneic and hypoxic for which she was placed on BiPAP. Additionally, she met 4/4 SIRS criteria (Tmax 102.9R HR:115 RR:44, WBC). She was given Vanc+Zosyn, Solumedrol 125mg IVx1. ICU consulted for acute hypoxic respiratory failure. At the bedside patient comfortable on BiPAP. She states that SOB started acutely earlier today. No clear inciting event. No associated symptoms including no CP, Palp, Leg pain, Arm pain, Dizziness. Over the past few days days she reports that she was feeling well w/ no complaints except for improving right shoulder pain from surgery. Denies urinary symptoms, cough, fever/chills, n/v/d.  being evaluated by structural heart for mitral valve repair.     Patient has dyskinesia when she takes her current dose of levodopa throughout the day. She has been bed bound during her stay at [facility]- and when she is stood up her orthostatic BP is in the 80s and she feels dizzy and light headed.       PAST MEDICAL & SURGICAL HISTORY:  Hypercholesteremia  Parkinsons  History of right shoulder replacement: x2      FAMILY HISTORY:      Social History:    Review of Systems:  Constitutional: No fever, weight loss or fatigue  Eyes: No eye pain, visual disturbances, or discharge  ENMT:  No difficulty hearing, tinnitus, vertigo; No sinus or throat pain  Neck: No pain or stiffness  Respiratory: No cough, wheezing, chills or hemoptysis  Cardiovascular: No chest pain, palpitations, shortness of breath, dizziness or leg swelling  Gastrointestinal: No abdominal pain. No nausea, vomiting or hematemesis; No diarrhea or constipation. Nohematochezia.  Genitourinary: No dysuria, frequency, hematuria or incontinence  Neurological: As per HPI  Skin: No itching, burning, rashes or lesions   Endocrine: No heat or cold intolerance; No hair loss  Musculoskeletal: No joint pain or swelling; No muscle, back or extremity pain  Psychiatric: No depression, anxiety, mood swings or difficulty sleeping  Heme/Lymph: No easy bruising or bleeding gums    MEDICATIONS  (STANDING):  acetaminophen   Tablet. 650 milliGRAM(s) Oral at bedtime  aspirin enteric coated 81 milliGRAM(s) Oral daily  atorvastatin 10 milliGRAM(s) Oral at bedtime  azithromycin   Tablet 500 milliGRAM(s) Oral <User Schedule>  buDESOnide   0.5 milliGRAM(s) Respule 0.5 milliGRAM(s) Inhalation every 12 hours  carbidopa/levodopa  25/100 1 Tablet(s) Oral <User Schedule>  cholecalciferol 1000 Unit(s) Oral daily  cyanocobalamin 1000 MICROGram(s) Oral daily  guaiFENesin ER 1200 milliGRAM(s) Oral every 12 hours  heparin  Injectable 5000 Unit(s) SubCutaneous every 8 hours  lidocaine   Patch 1 Patch Transdermal daily  potassium chloride   Powder 40 milliEquivalent(s) Oral daily  primidone 50 milliGRAM(s) Oral <User Schedule>  QUEtiapine 25 milliGRAM(s) Oral <User Schedule>  selegiline Oral Tab/Cap 5 milliGRAM(s) Oral <User Schedule>  sertraline 100 milliGRAM(s) Oral <User Schedule>  tiotropium 2.5 MICROgram(s)/olodaterol 2.5 MICROgram(s) (STIOLTO) Inhaler 2 Puff(s) Inhalation daily    MEDICATIONS  (PRN):  ALBUTerol/ipratropium for Nebulization 3 milliLiter(s) Nebulizer every 6 hours PRN Shortness of Breath and/or Wheezing  LORazepam   Injectable 0.5 milliGRAM(s) IV Push every 6 hours PRN Anxiety      Allergies    No Known Allergies    Intolerances        Vital Signs Last 24 Hrs  T(C): 36.3 (28 Aug 2018 20:37), Max: 36.9 (28 Aug 2018 08:48)  T(F): 97.3 (28 Aug 2018 20:37), Max: 98.5 (28 Aug 2018 08:48)  HR: 82 (28 Aug 2018 20:37) (76 - 82)  BP: 90/57 (28 Aug 2018 20:37) (90/57 - 105/67)  BP(mean): --  RR: 18 (28 Aug 2018 20:37) (18 - 18)  SpO2: 97% (28 Aug 2018 20:37) (94% - 98%)    Neurologic Exam:  Gen: intact facial expression and nl speech  CV: carotid arteries- no bruits, reg rate and rhythm, no murmurs  Neuro:  Mental status: Awake, alert and oriented x3.    language intact  CN 2-12 intact  motor - dyskinesias of her hands and feet  tone  - mild rigidity  resting tremor  gait difficulties        Labs:                        9.4    9.1   )-----------( 493      ( 28 Aug 2018 06:57 )             30.5     08-28    140  |  101  |  28<H>  ----------------------------<  91  4.2   |  27  |  0.93    Ca    9.1      28 Aug 2018 06:57  Mg     2.2     08-28                Radiology and Additional Studies:      Assessment & Plan:    Parkinsons patinet with orthostatic BP probably secondary to medications and deconditioning and cardiac meds    Decrease levodopa to 1 TID and stop sinemet CR .  repeat orthostatic BP and if stable get OOB and start mobilizing.  will consider midodrine once effect of lower levodopa is seen.

## 2018-08-28 NOTE — PROGRESS NOTE ADULT - SUBJECTIVE AND OBJECTIVE BOX
OVERNIGHT EVENTS: ILENE    SUBJECTIVE / INTERVAL HPI: Patient seen and examined at bedside. Patient felt slightly dizzy in bed like she is spinning. Dizziness went away when she closed her eyes. Has a little bit of SOB. Otherwise no complaints. Denies any headache, cough, CP, heart palpitations, no abd valdez, no n.v. Last BM was last night. Was soft. No dysuria.     VITAL SIGNS:  Vital Signs Last 24 Hrs  T(C): 36.3 (28 Aug 2018 15:55), Max: 36.9 (28 Aug 2018 08:48)  T(F): 97.4 (28 Aug 2018 15:55), Max: 98.5 (28 Aug 2018 08:48)  HR: 81 (28 Aug 2018 15:55) (76 - 81)  BP: 105/67 (28 Aug 2018 15:55) (95/63 - 115/73)  BP(mean): --  RR: 18 (28 Aug 2018 15:55) (17 - 18)  SpO2: 98% (28 Aug 2018 15:55) (94% - 98%)    PHYSICAL EXAM:    General: NAD  HEENT: NCAT; PERRL, anicteric sclera  Neck: supple, trachea midline  Cardiovascular: S1, S2 normal; RRR, no M/G/R  Respiratory: diffuse wheezes b/l. no rales/crackles/rhonci.   Gastrointestinal: soft, nontender, nondistended. bowel sounds present.  Skin: no ulcerations or visible rashes appreciated  Extremities: WWP; no edema, clubbing or cyanosis  Vascular: 2+ radial, DP/PT pulses B/L  Neurological: AAOx3; no focal deficits    MEDICATIONS:  MEDICATIONS  (STANDING):  acetaminophen   Tablet. 650 milliGRAM(s) Oral at bedtime  aspirin enteric coated 81 milliGRAM(s) Oral daily  atorvastatin 10 milliGRAM(s) Oral at bedtime  azithromycin   Tablet 500 milliGRAM(s) Oral <User Schedule>  buDESOnide   0.5 milliGRAM(s) Respule 0.5 milliGRAM(s) Inhalation every 12 hours  carbidopa/levodopa  25/100 1 Tablet(s) Oral <User Schedule>  cholecalciferol 1000 Unit(s) Oral daily  cyanocobalamin 1000 MICROGram(s) Oral daily  enalapril 5 milliGRAM(s) Oral daily  guaiFENesin ER 1200 milliGRAM(s) Oral every 12 hours  heparin  Injectable 5000 Unit(s) SubCutaneous every 8 hours  lidocaine   Patch 1 Patch Transdermal daily  potassium chloride   Powder 40 milliEquivalent(s) Oral daily  primidone 50 milliGRAM(s) Oral <User Schedule>  QUEtiapine 25 milliGRAM(s) Oral <User Schedule>  selegiline Oral Tab/Cap 5 milliGRAM(s) Oral <User Schedule>  sertraline 100 milliGRAM(s) Oral <User Schedule>  tiotropium 2.5 MICROgram(s)/olodaterol 2.5 MICROgram(s) (STIOLTO) Inhaler 2 Puff(s) Inhalation daily    MEDICATIONS  (PRN):  ALBUTerol/ipratropium for Nebulization 3 milliLiter(s) Nebulizer every 6 hours PRN Shortness of Breath and/or Wheezing  LORazepam   Injectable 0.5 milliGRAM(s) IV Push every 6 hours PRN Anxiety      ALLERGIES:  Allergies    No Known Allergies    Intolerances        LABS:                        9.4    9.1   )-----------( 493      ( 28 Aug 2018 06:57 )             30.5     08-28    140  |  101  |  28<H>  ----------------------------<  91  4.2   |  27  |  0.93    Ca    9.1      28 Aug 2018 06:57  Mg     2.2     08-28          CAPILLARY BLOOD GLUCOSE          RADIOLOGY & ADDITIONAL TESTS: Reviewed.

## 2018-08-28 NOTE — PROGRESS NOTE ADULT - PROBLEM SELECTOR PLAN 9
1) PCP Contacted on Admission Y--> Name & Phone #: Dr. Smith  2) Date of Contact with PCP:  3) PCP Contacted at Discharge: (Y/N)  4) Summary of Handoff Given to PCP:   5) Post-Discharge Appointment Date and Location:    Case and plan discussed with primary team

## 2018-08-28 NOTE — PROGRESS NOTE ADULT - PROBLEM SELECTOR PLAN 3
Patient was orthostatic today. BP lying down 105/65, HR 76, o2 sat 93 RA, sitting 95/60, HR 80, O2 85 RA, standing 85/49 HR 85, O2 94 on 2L. Possibly 2/2 Parkinson's disease vs cardiac-mitral regurg  -Neuro consulted. Recs appreciated. Carbidopa/levodopa may be contributing to hypotension so decreased carbidopa levadopa 25-100mg 1.5 tablet TID to 1 tablet TID and discontinued the nighttime longacting carbidopa  -will reassess orthostatics in the AM, if persistently orthostatic will consider midodrine

## 2018-08-28 NOTE — PROGRESS NOTE ADULT - PROBLEM SELECTOR PLAN 6
pt had recent shoulder replacement prior to admission. Was complaining of severe R shoulder pain. Shoulder xray 8/22 showed Periprosthetic fracture of the right humerus. Ortho surgeon from [facility] contacted  -hold off on shoulder PT for now  -arm in sling for stabilization  -will f/u with [facility] orthopedist    #HLD  -c/w Lipitor

## 2018-08-28 NOTE — PROGRESS NOTE ADULT - ATTENDING COMMENTS
1- hypoxic respiratory failure/ PNA- presumed gram negative origin- s/p abx course  2- Severe mitral regurgitation/Pulm HTN- type II, dilated RV- pt with hx rheumatic fever as child- all likely sequela  euvolemic- lasix discontinued  outpt fu with Dr. Pacheco for valve  3- Orthostatic hypotension- potentially 2/2 diuretics vs parkinson's  lasix stopped yesterday  if persistently orthostatic will consider midodrine, neuro eval  4-parkinson's disease- c/w current meds     out of bed today  check saturation off oxygen  dc planning- MYKEL
Patient seen and examined with house-staff during bedside rounds.  Resident note read, including vitals, physical findings, laboratory data, and radiological reports.   Revisions included below.  Direct personal management at bed side and extensive interpretation of the data.  Plan was outlined and discussed in details with the housestaff.  Decision making of high complexity  Action taken for acute disease activity to reflect the level of care provided:  - medication reconciliation  - review laboratory data  The patient was seen and examined several times. I discussed case with the family extensively a few times. The over the weekend events were reviewed. The chest x-ray from today revealed some improvement in the interstitial infiltrate. Echocardiogram revealed moderate to severe MR. Patient was started on diuretics and afterload reduction. Continue antibiotic. Continue diuretics. Continue IV steroids. Patient with high flow. She still having bronchospasm
Patient seen and examined with house-staff during bedside rounds.  Resident note read, including vitals, physical findings, laboratory data, and radiological reports.   Revisions included below.  Direct personal management at bed side and extensive interpretation of the data.  Plan was outlined and discussed in details with the housestaff.  Decision making of high complexity  Action taken for acute disease activity to reflect the level of care provided:  - medication reconciliation  - review laboratory data  The patient was seen several.times during the day. I discussed the case extensively with the family several times. Patient clinically improved compared to yesterday. She was able to be transitioned to nasal cannula. Her oxygen saturation is adequate on nasal cannula. High flow and night. Hold BiPAP. Echocardiogram was discussed with interventional cardiology and medical management with afterload reduction and diuresis at this point. Patient is on antibiotic and will discontinue azithromycin. Continue bronchodilators and inhaled steroids. Patient had barium swallow and no aspiration. Patient tolerated diet. Patient is to be start physical therapy and and probably will require subacute rehab
Patient seen and examined with house-staff during bedside rounds.  Resident note read, including vitals, physical findings, laboratory data, and radiological reports.   Revisions included below.  Direct personal management at bed side and extensive interpretation of the data.  Plan was outlined and discussed in details with the housestaff.  Decision making of high complexity  Action taken for acute disease activity to reflect the level of care provided:  - medication reconciliation  - review laboratory data  the patient was seen several times and case was discussed with multiple family members.  The patient initially improved and was able to be weaned from BiPAP to NC and was doing well,   The cultures were negative.  leg. antigen negative.   She developed sudden onset of Respiratory distress and bronchospasm. She required noninvasive ventilation. The chest x-ray with was consistent right upper lobe infiltrate. Patient was given one dose of IV steroids and bronchodilators and improved. Patient is awaiting echocardiogram.
episode of hypotension upon standing.  likely orthostatic has parkinson's also  dc lasix  monitor BP  no evidence of new sepsis    will consider VQ scan tomorrow given pulm htn and dilated RV of uncertain etiology
Patient seen and examined with house-staff during bedside rounds.  Resident note read, including vitals, physical findings, laboratory data, and radiological reports.   Revisions included below.  Direct personal management at bed side and extensive interpretation of the data.  Plan was outlined and discussed in details with the housestaff.  Decision making of high complexity  Action taken for acute disease activity to reflect the level of care provided:  - medication reconciliation  - review laboratory data  I discussed the case extensively with the family. The clinical picture is slowly improving. Patient has been treated for pneumonia, severe bronchospasms, and mitral regurg. I discussed the case with structural heart and they will follow on the mitral regurg as an outpatient. Medical management for her currently. Continue steroids bronchodilators antibiotics diuretics and afterload reduction. Patient was weaned to nasal cannula during the day. Patient was out of bed in chair. Patient was tolerating diet with normal aspiration.
Patient seen and examined with house-staff during bedside rounds.  Resident note read, including vitals, physical findings, laboratory data, and radiological reports.   Revisions included below.  Direct personal management at bed side and extensive interpretation of the data.  Plan was outlined and discussed in details with the housestaff.  Decision making of high complexity  Action taken for acute disease activity to reflect the level of care provided:  - medication reconciliation  - review laboratory data  The patient was clinically improving. I was decreased as to which. Patient is out of bed in chair. Patient was weaned to nasal cannula. Did not use of CPAP +5 and will discontinue. Continue bronchodilators. Continue diuretics and afterload reduction. Patient is off antibiotics. All cultures were negative to date. I discussed the case with the orthopedic surgeon after obtaining x-ray of the right humerus and there is periprosthetic fracture and his recommendation is to have on physical therapy at this point. I discussed the case with the , , family,. Patient was started on 3% saline nebulizer and [employer] to mobilize her secretions
Patient seen and examined with house-staff during bedside rounds.  Resident note read, including vitals, physical findings, laboratory data, and radiological reports.   Revisions included below.  Direct personal management at bed side and extensive interpretation of the data.  Plan was outlined and discussed in details with the housestaff.  Decision making of high complexity  Action taken for acute disease activity to reflect the level of care provided:  - medication reconciliation  - review laboratory data  The patient was seen several times during the day and I discussed the case extensively with the family multiple times. Patient is clinically stable on the current regimen. She's out of bed and chair and tolerated the nasal cannula. The patient is off BiPAP on high flow oxygen. Patient did physical therapy. Patient is not coughing as much. Continue on the current regimen on  stioltoand Pulmicort inhaler. Continue oral steroids

## 2018-08-28 NOTE — PROGRESS NOTE ADULT - PROBLEM SELECTOR PLAN 7
Neuro consulted today due to orthostatic hypotension that may be due to Parkinson's dz.  -Carbidopa/levodopa may be contributing to orthoasis so decreased carbidopa levadopa 25-100mg 1.5 tablet TID to 1 tablet TID and discontinued the nighttime long acting carbidopa  -will continue to monitor

## 2018-08-28 NOTE — PROGRESS NOTE ADULT - PROBLEM SELECTOR PLAN 1
Resolved. Patient met 4/4 SIRS criteria - T 102, RR 25, , WBC 10.8 on admission. Lactate 1.3. RVP negative.  CT PE negative for PE but showed possible RUL consolidation.  - legionella urine antigen negative. Sepsis due to hospital acquired PNA.   -completed 7 day course vanc/zosyn  -continues to be afebrile  -continue to monitor WBCs  -c/w mucinex for secretions   -aerobika device and chest PT    #hypoxia: likely 2/2 atelectasis with resolving pna  -will retest patient's o2 requirement tmrw with ambulation to determine whether short term home o2 is required.  -low to no suspicion for PE per pulm recs  -continue incentive spirometer, aerobika device, OOB as tolerated, PT as tolerated,   -c/w nebulized saline, mucinex

## 2018-08-28 NOTE — PROGRESS NOTE ADULT - ASSESSMENT
72F [facility] Parkinson's Disease, R. shoulder surgery (7da at [facility]) who was at rehab facility and experience acute onset shortness of breath admitted for HAP PNA and reactive airway disease.

## 2018-08-28 NOTE — PROGRESS NOTE ADULT - PROBLEM SELECTOR PLAN 2
Patient with productive green sputum cough, acute SOB, met 4/4 SIRS criteria on admission T 102, RR 25, , WBC 10.8. Was at [facility] 1 week before admission for shoulder replacement surgery. Initial concern for PE given acuity of symptoms but CT PE negative. CT demonstrated RUL consolidation consistent with hospital-acquired PNA.   -finished 7 day course of vanc/zosyn 8/22  -azythromycin dosed to MWF for anti-inflammatory benefit, atypical coverage not thought to be necessary at this point  -f/u CXR with improvement in pulmonary edema  -negative blood cultures to date  -negative legionella   -negative RVP

## 2018-08-28 NOTE — PROGRESS NOTE ADULT - SUBJECTIVE AND OBJECTIVE BOX
Interval Events:  Patient seen and examined at bedside.    Patient found to be orthostatic yesterday. Mildly hypotensive this AM. Possibly due to overdiuresis. Lasix held. This AM patient feels overall better. only requiring 2L NC with O2 sats above 95%. Resting SpO2 88% on room air which improved with deep respiration. Continues to have productive cough.      Family at bedside mentions that they spoke to her PMD who recommended VQ scan to rule out "clot. however, pulm team with low suspicion for PE. I explained to the family that we do not think that is needed at this time.     MEDICATIONS:  Pulmonary:  ALBUTerol/ipratropium for Nebulization 3 milliLiter(s) Nebulizer every 6 hours PRN  buDESOnide   0.5 milliGRAM(s) Respule 0.5 milliGRAM(s) Inhalation every 12 hours  guaiFENesin ER 1200 milliGRAM(s) Oral every 12 hours  tiotropium 2.5 MICROgram(s)/olodaterol 2.5 MICROgram(s) (STIOLTO) Inhaler 2 Puff(s) Inhalation daily    Antimicrobials:  azithromycin   Tablet 500 milliGRAM(s) Oral <User Schedule>    Anticoagulants:  aspirin enteric coated 81 milliGRAM(s) Oral daily  heparin  Injectable 5000 Unit(s) SubCutaneous every 8 hours    Cardiac:  enalapril 5 milliGRAM(s) Oral daily    Endocrine:  atorvastatin 10 milliGRAM(s) Oral at bedtime    Allergies  No Known Allergies    Vital Signs Last 24 Hrs  T(C): 36.9 (28 Aug 2018 08:48), Max: 36.9 (28 Aug 2018 08:48)  T(F): 98.5 (28 Aug 2018 08:48), Max: 98.5 (28 Aug 2018 08:48)  HR: 80 (28 Aug 2018 08:48) (76 - 84)  BP: 102/63 (28 Aug 2018 08:48) (90/52 - 137/62)  BP(mean): 89 (27 Aug 2018 12:14) (89 - 89)  RR: 18 (28 Aug 2018 08:48) (17 - 18)  SpO2: 94% (28 Aug 2018 08:48) (94% - 97%)    08-27 @ 07:01  -  08-28 @ 07:00  --------------------------------------------------------  IN: 0 mL / OUT: 1300 mL / NET: -1300 mL      Physical Exam:  General: NAD, AAO x3  HEENT: No icterus,. Moist mucous membranes  Neck: No JVD noted. Supple, no meningismus  Cardio: S1, S2 noted, RRR. No murmurs, rubs or gallops  Resp: Clear to auscultation b/l. No adventitious sounds  Abdo: Soft, NT, bowel sounds present. No organomegaly  Extremities: No edema noted. Pulses present b/l  Neuro: AAO x3, grossly normal motor strength.  Lymphnodes: no lymphadenopathy identified.  Skin: Dry, no rashes    LABS:    CBC Full  -  ( 28 Aug 2018 06:57 )  WBC Count : 9.1 K/uL  Hemoglobin : 9.4 g/dL  Hematocrit : 30.5 %  Platelet Count - Automated : 493 K/uL  Mean Cell Volume : 94.7 fL  Mean Cell Hemoglobin : 29.2 pg  Mean Cell Hemoglobin Concentration : 30.8 g/dL  Auto Neutrophil % : 70.5 %  Auto Lymphocyte % : 17.7 %  Auto Monocyte % : 7.4 %  Auto Eosinophil % : 4.2 %  Auto Basophil % : 0.2 %    08-28    140  |  101  |  28<H>  ----------------------------<  91  4.2   |  27  |  0.93    Ca    9.1      28 Aug 2018 06:57  Mg     2.2     08-28      RADIOLOGY & ADDITIONAL STUDIES (The following images were personally reviewed):    My interpretation:    Markedly improved aeration and resolving opacities. R>L

## 2018-08-28 NOTE — PROGRESS NOTE ADULT - SUBJECTIVE AND OBJECTIVE BOX
improved dyspnea and cough.    [ X ] ROS otherwise negative      Vital Signs Vital Signs Last 24 Hrs  T(C): 36.9 (28 Aug 2018 08:48), Max: 36.9 (28 Aug 2018 08:48)  T(F): 98.5 (28 Aug 2018 08:48), Max: 98.5 (28 Aug 2018 08:48)  HR: 80 (28 Aug 2018 08:48) (76 - 84)  BP: 102/63 (28 Aug 2018 08:48) (90/52 - 137/62)  BP(mean): 89 (27 Aug 2018 12:14) (89 - 89)  RR: 18 (28 Aug 2018 08:48) (17 - 18)  SpO2: 94% (28 Aug 2018 08:48) (94% - 97%)            General: well appearing   HEENT: [X  ]Nonicteric [ X ]EDUARD [  ]Other:  CV: RRR, murmur heard throughout precordium   Lungs: bibasilar rales-  mild   ABD: soft, NT positive BS  Ext: [X  ]2+Pulses   Skin: [X  ] Warm and Dry    Neuro: [X  ] AO x 3

## 2018-08-28 NOTE — PROGRESS NOTE ADULT - PROBLEM SELECTOR PLAN 5
Possible undiagnosed asthma vs COPD exacerbation  CXR chest demonstrated right upper lobe regions of patchy consolidation and interstitial pulmonary edema throughout the bilateral upper lobes and bilateral lower lobes. s/p prednisone 50mg PO last day 8/27  -c/w duonebs prn  -will f/u with Dr Smith as outpatient for formal PFT testing +/- methacholine challenge  -c/w Stioloto+ pulmiocrt for now    -c/w stiolto and pulmicort 0.5mg BID  -inhaler teaching  -c/w azithromycin MWF  -c/w mucinex for secretions   -aerobika device and chest PT

## 2018-08-28 NOTE — PROGRESS NOTE ADULT - PROBLEM SELECTOR PLAN 1
Severe Bilateral multilobar pneumonia. Now completed antibiotic course. Afebrile now. Continues to require oxygen although decreasing. Now stable on nasal cannula.     Hypoxia likely primarily secondary to atelectasis at this point , with additional component of resolving pneumonia. Low to No suspicion for PE.     Recommendations :  - Cont. Incentive spirometer  - Cont. Areobika device  - OOB as tolerated  - PT as tolerated  - Agree with nebulized saline and Mucinex.  - Would test patient's oxygen requirement with ambulation to determine whether short term home oxygen may be required.

## 2018-08-28 NOTE — PROGRESS NOTE ADULT - PROBLEM SELECTOR PLAN 4
Pt was found to have mod-severe mitral regurg on echo with EF>60%. Has history of RF as child.  -c/w enalapril 5mg/day   -as per CT sx, will f/u as outpatient with Dr. Pacheco  -Dr. Arechiga following. Recs appreciated. Low criteria for PE-no need for V/q  -Will f/u as outpt with Dr. Dee to assess volume status.  -lasix discontinued yesterday 8/27    #pulm htn  Echo 8/20 demonstrated EF 65%, dilated left atrium, moderate to severe MR, pulmonary artery systolic pressure 50mmhg. type II  -c/w PO enalapril to 5 mg daily.  -see above

## 2018-08-28 NOTE — CONSULT NOTE ADULT - SUBJECTIVE AND OBJECTIVE BOX
REASON FOR CONSULT:    HISTORY OF PRESENT ILLNESS:    72 year old female with family history of premature cardiac death in both parents (54 in mother from malignant HTN, and 50 in father from MI) but with no family history of valvular disease and medical history of Parkinson's disease, recent right shoulder surgery at [facility] on 8/9/18, and rheumatic fever as a child who initially presented to [facility] ED with sudden onset acute SOB, cough, and wheeze.  In ED, she was noted to be tachypneic and hypoxic with Tmax 102.9F, HR 115bpm, RR 44, and with leukocytosis.  She was started on empiric Vancomycin/Zosyn and given Solumedrol for SIRS and admitted to ICU for medical management of acute hypoxic respiratory failure.  Patient reports progressive SOB at rest and HANSON over the last year with intermittent non-productive cough and wheeze without LE edema or orthopnea.  During her hospital course, she underwent ECHO on 8/20/18 demonstrated dilated LA, mild MV thickening with moderate to severe MR, mild TR, PASP 50mmHg, dilated RV, and EF 65%.  Dr. Delgado, Structural Heart, was consulted for evaluation of moderate to severe MR for possible procedural intervention.  On evaluation, pt continues to endorse profound SOB at rest which is improved somewhat with 6L NC and has improved dramatically since her admission but otherwise denies HA, AMS, CP, palpitations, orthopnea, PND, n/v/d, fevers, chills.       PAST MEDICAL & SURGICAL HISTORY:  Hypercholesteremia  Parkinsons  History of right shoulder replacement: x2      [ ] Diabetes   [ ] Hypertension  [ ] Hyperlipidemia  [ ] CAD  [ ] PCI  [ ] CABG    PREVIOUS DIAGNOSTIC TESTING:    [ ] Echocardiogram:  [ ]  Catheterization:  [ ] Stress Test:  	    MEDICATIONS:  enalapril 5 milliGRAM(s) Oral daily    azithromycin   Tablet 500 milliGRAM(s) Oral <User Schedule>    ALBUTerol/ipratropium for Nebulization 3 milliLiter(s) Nebulizer every 6 hours PRN  buDESOnide   0.5 milliGRAM(s) Respule 0.5 milliGRAM(s) Inhalation every 12 hours  guaiFENesin ER 1200 milliGRAM(s) Oral every 12 hours  tiotropium 2.5 MICROgram(s)/olodaterol 2.5 MICROgram(s) (STIOLTO) Inhaler 2 Puff(s) Inhalation daily    acetaminophen   Tablet. 650 milliGRAM(s) Oral at bedtime  carbidopa/levodopa  25/100 1.5 Tablet(s) Oral <User Schedule>  carbidopa/levodopa CR 25/100 1 Tablet(s) Oral <User Schedule>  LORazepam   Injectable 0.5 milliGRAM(s) IV Push every 6 hours PRN  primidone 50 milliGRAM(s) Oral <User Schedule>  QUEtiapine 25 milliGRAM(s) Oral <User Schedule>  selegiline Oral Tab/Cap 5 milliGRAM(s) Oral <User Schedule>  sertraline 100 milliGRAM(s) Oral <User Schedule>      atorvastatin 10 milliGRAM(s) Oral at bedtime    aspirin enteric coated 81 milliGRAM(s) Oral daily  cholecalciferol 1000 Unit(s) Oral daily  cyanocobalamin 1000 MICROGram(s) Oral daily  heparin  Injectable 5000 Unit(s) SubCutaneous every 8 hours  lidocaine   Patch 1 Patch Transdermal daily  potassium chloride   Powder 40 milliEquivalent(s) Oral daily      FAMILY HISTORY:      SOCIAL HISTORY:    [ ] Non-smoker  [ ] Smoker  [ ] Alcohol    Allergies    No Known Allergies    Intolerances    	    REVIEW OF SYSTEMS:    [x] as per HPI  CONSTITUTIONAL: No fever, weight loss, or fatigue  ENT:  No difficulty hearing, tinnitus, vertigo; No sinus or throat pain  RESPIRATORY: No cough, wheezing, chills or hemoptysis; No Shortness of Breath  CARDIOVASCULAR: No chest pain, palpitations, dizziness, or leg swelling  GASTROINTESTINAL: No abdominal or epigastric pain. No nausea, vomiting, or hematemesis; No diarrhea or constipation. No melena or hematochezia.  GENITOURINARY: No dysuria, frequency, hematuria, or incontinence  NEUROLOGICAL: No headaches, memory loss, loss of strength, numbness, or tremors  MUSCULOSKELETAL: No joint pain or swelling; No muscle, back, or extremity pain  [x] All others negative	  [ ] Unable to obtain    PHYSICAL EXAM:  T(C): 36.9 (08-28-18 @ 08:48), Max: 36.9 (08-28-18 @ 08:48)  HR: 80 (08-28-18 @ 08:48) (76 - 84)  BP: 102/63 (08-28-18 @ 08:48) (90/52 - 137/62)  RR: 18 (08-28-18 @ 08:48) (17 - 18)  SpO2: 94% (08-28-18 @ 08:48) (94% - 97%)  Wt(kg): --  I&O's Summary    27 Aug 2018 07:01  -  28 Aug 2018 07:00  --------------------------------------------------------  IN: 0 mL / OUT: 1300 mL / NET: -1300 mL        Appearance: Normal	  HEENT:   Normal oral mucosa, PERRL, EOMI	  Lymphatic: No lymphadenopathy  Cardiovascular: Normal S1 S2, No JVD, No murmurs, No edema  Respiratory: Lungs clear to auscultation	  Psychiatry: A & O x 3, Mood & affect appropriate  Gastrointestinal:  Soft, Non-tender, + BS	  Skin: No rashes, No ecchymoses, No cyanosis	  Neurologic: Non-focal  Extremities: Normal range of motion, No clubbing, cyanosis or edema  Vascular: Peripheral pulses palpable 2+ bilaterally    TELEMETRY: 	    ECG:  < from: 12 Lead ECG (08.23.18 @ 17:03) >  Diagnosis Line Sinus rhythm withpremature atrial complexes  Nonspecific ST abnormality  Prolonged QT  Abnormal ECG    < end of copied text >    ECHO: < from: Echocardiogram (08.20.18 @ 11:40) >  The left atrium is dilated. Right atrial size isnormal.There is mild   aortic   valve thickening. The aortic valve is trileaflet. No aortic regurgitation   noted. No hemodynamically significant valvular aortic stenosis.  There   is mild   mitral valve thickening. There is mild mitral annular calcification.   There is   moderate to severe mitral regurgitation.  Structurally normal tricuspid   valve.   There is mild tricuspid regurgitation.The pulmonary artery systolic   pressure   is estimated to be 50 mmHg.This study shows evidence of pulmonary   hypertension.The pulmonic valve is not well visualized. There is trace   pulmonic regurgitation.  The right ventricle is dilated.The right   ventricular   systolic function is normal.There is mild concentric left ventricular   hypertrophy. The left ventricular wall motion is normal. The left   ventricular   ejection fraction is 65%.  No aortic root dilatation.There is no   pericardial   effusion.No prior study for comparison.    < end of copied text >    STRESS:  CATH:  	  RADIOLOGY:  CXR:  CT:  US:   	  	  LABS:	 	    CARDIAC MARKERS:                                  9.4    9.1   )-----------( 493      ( 28 Aug 2018 06:57 )             30.5     08-28    140  |  101  |  28<H>  ----------------------------<  91  4.2   |  27  |  0.93    Ca    9.1      28 Aug 2018 06:57  Mg     2.2     08-28      proBNP:   Lipid Profile:   HgA1c:   TSH:     ASSESSMENT/PLAN: 	    # SHD - mod/severe MR with EF >60%, pt states she had rheumatic fever as a child and subseq developed MS - had work up in [place] decades ago.  Etiology of PHTN from WHO group II - LV dysfxn.  Low criteria for PE - no need to scan for PE  Given current rx for PNA finished and pt deconditioned, recommend hold diuresis for now  Discharge nd follow up as outpatient where violume status will be assessed  d/w pt daily weight diary - if >10lbs increase to restart lasix and call office  gurmeet known to KHARI Delgado who will see pt as outpt as well.  SBP at goal, needs to ambulate

## 2018-08-28 NOTE — PROGRESS NOTE ADULT - PROBLEM SELECTOR PLAN 2
Unclear if patient had underlying reactive airway disease. She was placed on short course of prednisone which finishes today.     Recommendations:  - F/u outpatient with Dr. Smith for formal PFT testing +/- methacholine challenge  - cont. Chad.   - Unclear benefit from triple inhaler therapy given uncertain diagnosis, however would continue for now. ( Stiolto + Pulmicort )

## 2018-08-28 NOTE — PROGRESS NOTE ADULT - PROBLEM SELECTOR PLAN 8
F - No IVF at this time.  E - replete for K < 4 and Mg < 2  N - Passed barium swallow. c/w regular diet with thin liquids and small sips to drink    #Hypokalemia: Likely 2/2 lasix. Patient continues to be hypokalemic despite 20mg PO potassium qd. Will increase to 40 today  - increased potassium to 40 PO daily  - continue to monitor w/ BMPs every other day    Dispo: [facility]    DVT PPx: SCDs

## 2018-08-29 LAB
ANION GAP SERPL CALC-SCNC: 13 MMOL/L — SIGNIFICANT CHANGE UP (ref 5–17)
BUN SERPL-MCNC: 31 MG/DL — HIGH (ref 7–23)
CALCIUM SERPL-MCNC: 9.1 MG/DL — SIGNIFICANT CHANGE UP (ref 8.4–10.5)
CHLORIDE SERPL-SCNC: 103 MMOL/L — SIGNIFICANT CHANGE UP (ref 96–108)
CO2 SERPL-SCNC: 23 MMOL/L — SIGNIFICANT CHANGE UP (ref 22–31)
CREAT SERPL-MCNC: 0.75 MG/DL — SIGNIFICANT CHANGE UP (ref 0.5–1.3)
GLUCOSE SERPL-MCNC: 104 MG/DL — HIGH (ref 70–99)
HCT VFR BLD CALC: 31.4 % — LOW (ref 34.5–45)
HGB BLD-MCNC: 9.3 G/DL — LOW (ref 11.5–15.5)
MAGNESIUM SERPL-MCNC: 2.1 MG/DL — SIGNIFICANT CHANGE UP (ref 1.6–2.6)
MCHC RBC-ENTMCNC: 27.9 PG — SIGNIFICANT CHANGE UP (ref 27–34)
MCHC RBC-ENTMCNC: 29.6 G/DL — LOW (ref 32–36)
MCV RBC AUTO: 94.3 FL — SIGNIFICANT CHANGE UP (ref 80–100)
PLATELET # BLD AUTO: 423 K/UL — HIGH (ref 150–400)
POTASSIUM SERPL-MCNC: 4.6 MMOL/L — SIGNIFICANT CHANGE UP (ref 3.5–5.3)
POTASSIUM SERPL-SCNC: 4.6 MMOL/L — SIGNIFICANT CHANGE UP (ref 3.5–5.3)
RBC # BLD: 3.33 M/UL — LOW (ref 3.8–5.2)
RBC # FLD: 15.7 % — SIGNIFICANT CHANGE UP (ref 10.3–16.9)
SODIUM SERPL-SCNC: 139 MMOL/L — SIGNIFICANT CHANGE UP (ref 135–145)
WBC # BLD: 8.4 K/UL — SIGNIFICANT CHANGE UP (ref 3.8–10.5)
WBC # FLD AUTO: 8.4 K/UL — SIGNIFICANT CHANGE UP (ref 3.8–10.5)

## 2018-08-29 PROCEDURE — 99233 SBSQ HOSP IP/OBS HIGH 50: CPT | Mod: GC

## 2018-08-29 PROCEDURE — 99232 SBSQ HOSP IP/OBS MODERATE 35: CPT

## 2018-08-29 RX ORDER — BACITRACIN ZINC 500 UNIT/G
1 OINTMENT IN PACKET (EA) TOPICAL EVERY 4 HOURS
Qty: 0 | Refills: 0 | Status: DISCONTINUED | OUTPATIENT
Start: 2018-08-29 | End: 2018-08-29

## 2018-08-29 RX ORDER — BACITRACIN ZINC 500 UNIT/G
1 OINTMENT IN PACKET (EA) TOPICAL THREE TIMES A DAY
Qty: 0 | Refills: 0 | Status: DISCONTINUED | OUTPATIENT
Start: 2018-08-29 | End: 2018-08-30

## 2018-08-29 RX ORDER — CARBIDOPA AND LEVODOPA 25; 100 MG/1; MG/1
1.5 TABLET ORAL
Qty: 0 | Refills: 0 | Status: DISCONTINUED | OUTPATIENT
Start: 2018-08-29 | End: 2018-08-30

## 2018-08-29 RX ORDER — SODIUM CHLORIDE 9 MG/ML
250 INJECTION INTRAMUSCULAR; INTRAVENOUS; SUBCUTANEOUS ONCE
Qty: 0 | Refills: 0 | Status: COMPLETED | OUTPATIENT
Start: 2018-08-29 | End: 2018-08-29

## 2018-08-29 RX ADMIN — Medication 650 MILLIGRAM(S): at 23:13

## 2018-08-29 RX ADMIN — Medication 1 APPLICATION(S): at 13:12

## 2018-08-29 RX ADMIN — AZITHROMYCIN 500 MILLIGRAM(S): 500 TABLET, FILM COATED ORAL at 10:29

## 2018-08-29 RX ADMIN — PRIMIDONE 50 MILLIGRAM(S): 250 TABLET ORAL at 05:19

## 2018-08-29 RX ADMIN — Medication 0.5 MILLIGRAM(S): at 22:14

## 2018-08-29 RX ADMIN — HEPARIN SODIUM 5000 UNIT(S): 5000 INJECTION INTRAVENOUS; SUBCUTANEOUS at 05:18

## 2018-08-29 RX ADMIN — Medication 1000 UNIT(S): at 11:56

## 2018-08-29 RX ADMIN — Medication 81 MILLIGRAM(S): at 11:56

## 2018-08-29 RX ADMIN — HEPARIN SODIUM 5000 UNIT(S): 5000 INJECTION INTRAVENOUS; SUBCUTANEOUS at 22:14

## 2018-08-29 RX ADMIN — CARBIDOPA AND LEVODOPA 1 TABLET(S): 25; 100 TABLET ORAL at 07:38

## 2018-08-29 RX ADMIN — ATORVASTATIN CALCIUM 10 MILLIGRAM(S): 80 TABLET, FILM COATED ORAL at 22:14

## 2018-08-29 RX ADMIN — Medication 650 MILLIGRAM(S): at 22:13

## 2018-08-29 RX ADMIN — PREGABALIN 1000 MICROGRAM(S): 225 CAPSULE ORAL at 11:55

## 2018-08-29 RX ADMIN — SODIUM CHLORIDE 1000 MILLILITER(S): 9 INJECTION INTRAMUSCULAR; INTRAVENOUS; SUBCUTANEOUS at 09:40

## 2018-08-29 RX ADMIN — Medication 1 APPLICATION(S): at 11:58

## 2018-08-29 RX ADMIN — Medication 1200 MILLIGRAM(S): at 17:07

## 2018-08-29 RX ADMIN — SERTRALINE 100 MILLIGRAM(S): 25 TABLET, FILM COATED ORAL at 05:15

## 2018-08-29 RX ADMIN — Medication 0.5 MILLIGRAM(S): at 10:30

## 2018-08-29 RX ADMIN — HEPARIN SODIUM 5000 UNIT(S): 5000 INJECTION INTRAVENOUS; SUBCUTANEOUS at 13:21

## 2018-08-29 RX ADMIN — QUETIAPINE FUMARATE 25 MILLIGRAM(S): 200 TABLET, FILM COATED ORAL at 22:14

## 2018-08-29 RX ADMIN — Medication 40 MILLIEQUIVALENT(S): at 11:56

## 2018-08-29 RX ADMIN — SELEGILINE HYDROCHLORIDE 5 MILLIGRAM(S): 1.25 TABLET, ORALLY DISINTEGRATING ORAL at 05:15

## 2018-08-29 RX ADMIN — CARBIDOPA AND LEVODOPA 1.5 TABLET(S): 25; 100 TABLET ORAL at 17:35

## 2018-08-29 RX ADMIN — TIOTROPIUM BROMIDE AND OLODATEROL 2 PUFF(S): 3.124; 2.736 SPRAY, METERED RESPIRATORY (INHALATION) at 12:15

## 2018-08-29 RX ADMIN — Medication 1200 MILLIGRAM(S): at 05:15

## 2018-08-29 RX ADMIN — CARBIDOPA AND LEVODOPA 1 TABLET(S): 25; 100 TABLET ORAL at 11:56

## 2018-08-29 NOTE — PROGRESS NOTE ADULT - PROBLEM SELECTOR PLAN 3
Patient was orthostatic again today. BP lying down 101/61, HR 79, o2 sat 93 RA, sitting 101/54 HR 83, standing 71/43 HR 88, O2 94 on 2L. Possibly 2/2 Parkinson's disease vs cardiac-mitral regurg  -Neuro consulted. Recs appreciated.   -Per neuro recs Sinamet may be contributing to hypotension so decreased sinamet 25-100mg 1.5 tablet TID to 1 tablet TID and discontinued the nighttime long acting sinamet yesterday 8/28  -was still orthostatic today 8/29 so went back up to home dose sinamet 1.5 tab TID and discontinued the selegiline per Neuro recs.

## 2018-08-29 NOTE — PROGRESS NOTE ADULT - PROBLEM SELECTOR PLAN 6
pt had recent shoulder replacement prior to admission. Was complaining of severe R shoulder pain. Shoulder xray 8/22 showed Periprosthetic fracture of the right humerus. Ortho surgeon from [facility] contacted  -hold off on shoulder PT for now  -arm in sling for stabilization  -will f/u with [facility] orthopedist    #HLD  -c/w Lipitor

## 2018-08-29 NOTE — PROGRESS NOTE ADULT - SUBJECTIVE AND OBJECTIVE BOX
Chief Complaint/Reason for Consult: cv mgmt  INTERVAL HPI: noted for orthostasis, clinically dry on exam  	  MEDICATIONS:    azithromycin   Tablet 500 milliGRAM(s) Oral <User Schedule>    ALBUTerol/ipratropium for Nebulization 3 milliLiter(s) Nebulizer every 6 hours PRN  buDESOnide   0.5 milliGRAM(s) Respule 0.5 milliGRAM(s) Inhalation every 12 hours  guaiFENesin ER 1200 milliGRAM(s) Oral every 12 hours  tiotropium 2.5 MICROgram(s)/olodaterol 2.5 MICROgram(s) (STIOLTO) Inhaler 2 Puff(s) Inhalation daily    acetaminophen   Tablet. 650 milliGRAM(s) Oral at bedtime  carbidopa/levodopa  25/100 1.5 Tablet(s) Oral <User Schedule>  LORazepam   Injectable 0.5 milliGRAM(s) IV Push every 6 hours PRN  primidone 50 milliGRAM(s) Oral <User Schedule>  QUEtiapine 25 milliGRAM(s) Oral <User Schedule>  sertraline 100 milliGRAM(s) Oral <User Schedule>      atorvastatin 10 milliGRAM(s) Oral at bedtime    aspirin enteric coated 81 milliGRAM(s) Oral daily  BACItracin   Ointment 1 Application(s) Topical three times a day  cholecalciferol 1000 Unit(s) Oral daily  cyanocobalamin 1000 MICROGram(s) Oral daily  heparin  Injectable 5000 Unit(s) SubCutaneous every 8 hours  lidocaine   Patch 1 Patch Transdermal daily  potassium chloride   Powder 40 milliEquivalent(s) Oral daily      REVIEW OF SYSTEMS:  [x] As per HPI  CONSTITUTIONAL: No fever, weight loss, or fatigue  RESPIRATORY: No cough, wheezing, chills or hemoptysis; No Shortness of Breath  CARDIOVASCULAR: No chest pain, palpitations, dizziness, or leg swelling  GASTROINTESTINAL: No abdominal or epigastric pain. No nausea, vomiting, or hematemesis; No diarrhea or constipation. No melena or hematochezia.  MUSCULOSKELETAL: No joint pain or swelling; No muscle, back, or extremity pain  [x] All others negative	  [ ] Unable to obtain    PHYSICAL EXAM:  T(C): 36.2 (08-29-18 @ 08:40), Max: 36.3 (08-28-18 @ 15:55)  HR: 84 (08-29-18 @ 09:40) (81 - 89)  BP: 105/68 (08-29-18 @ 09:40) (74/40 - 105/68)  RR: 18 (08-29-18 @ 09:40) (18 - 18)  SpO2: 99% (08-29-18 @ 09:40) (93% - 99%)  Wt(kg): --  I&O's Summary        Appearance: Normal	  HEENT:   Normal oral mucosa  Cardiovascular: Normal S1 S2, No JVD, No murmurs, No edema  Respiratory: Lungs clear to auscultation	  Gastrointestinal:  Soft, Non-tender, + BS	  Extremities: Normal range of motion, No clubbing, cyanosis or edema  Vascular: Peripheral pulses palpable 2+ bilaterally    TELEMETRY: 	    ECG:   	  RADIOLOGY:   CXR:  CT:  US:    CARDIAC TESTING:  Echocardiogram:  Catheterization:  Stress Test:      LABS:	 	    CARDIAC MARKERS:                                  9.3    8.4   )-----------( 423      ( 29 Aug 2018 06:43 )             31.4     08-29    139  |  103  |  31<H>  ----------------------------<  104<H>  4.6   |  23  |  0.75    Ca    9.1      29 Aug 2018 06:42  Mg     2.1     08-29      proBNP:   Lipid Profile:   HgA1c:   TSH:     ASSESSMENT/PLAN: 	    # Orthostasis - common in parkinson patients - agree [facility] neuro recs re: levadopa  --clinically dehydrated encourage agressive PO hydration and aument with IVF as toelrated, frequent lung checks    # SHD - mod/severe MR with EF >60%, pt states she had rheumatic fever as a child and subseq developed MS - had work up in [place] decades ago.  Etiology of PHTN from WHO group II - LV dysfxn.  Low criteria for PE - no need to scan for PE  Given current rx for PNA finished and pt deconditioned, recommend hold diuresis for now  Dischargea nd follow up as outpatient where volume status will be assessed  d/w pt daily weight diary - if >10lbs increase to restart lasix and call office  is known to KHARI Delgado who will see pt as outpt as well.  SBP at goal, needs to ambulate

## 2018-08-29 NOTE — PROGRESS NOTE ADULT - PROBLEM SELECTOR PLAN 5
Possible undiagnosed asthma vs COPD exacerbation  CXR chest demonstrated right upper lobe regions of patchy consolidation and interstitial pulmonary edema throughout the bilateral upper lobes and bilateral lower lobes. s/p prednisone 50mg PO last day 8/27  -c/w duonebs prn  -will f/u with Dr Smith as outpatient for formal PFT testing +/- methacholine challenge  -c/w Stioloto+ pulmiocrt for now  -inhaler teaching  -c/w azithromycin MWF  -c/w mucinex for secretions   -aerobika device and chest PT

## 2018-08-29 NOTE — PROGRESS NOTE ADULT - PROBLEM SELECTOR PLAN 7
Neuro consulted today due to orthostatic hypotension that may be due to Parkinson's dz.  -Carbidopa/levodopa may be contributing to orthoastasis so yesterday decreased sinamet 25-100mg 1.5 tablet TID to 1 tablet TID and discontinued the nighttime long acting carbidopa  -was still orthostatic today 8/29 so went back up to home dose sinamet 1.5 tab TID and discontinued the selegiline per Neuro recs.  -will continue to monitor

## 2018-08-29 NOTE — PROGRESS NOTE ADULT - PROBLEM SELECTOR PLAN 2
Patient with productive green sputum cough, acute SOB, met 4/4 SIRS criteria on admission T 102, RR 25, , WBC 10.8. Was at [facility] 1 week before admission for shoulder replacement surgery. Initial concern for PE given acuity of symptoms but CT PE negative. CT demonstrated RUL consolidation consistent with hospital-acquired PNA.   -finished 7 day course of vanc/zosyn 8/22  -azythromycin dosed to MWF for anti-inflammatory benefit, atypical coverage not thought to be necessary at this point  -f/u CXR with improvement in pulmonary edema  -negative blood cultures to date  -negative legionella   -negative RVP

## 2018-08-29 NOTE — PROGRESS NOTE ADULT - ASSESSMENT
72F [facility] Parkinson's Disease, R. shoulder surgery (5da at [facility]) who was at rehab facility and experience acute onset shortness of breath admitted for HAP PNA and reactive airway disease.

## 2018-08-29 NOTE — PROGRESS NOTE ADULT - SUBJECTIVE AND OBJECTIVE BOX
OVERNIGHT EVENTS:    SUBJECTIVE / INTERVAL HPI: Patient seen and examined at bedside.     VITAL SIGNS:  Vital Signs Last 24 Hrs  T(C): 36.2 (29 Aug 2018 08:40), Max: 36.3 (28 Aug 2018 15:55)  T(F): 97.2 (29 Aug 2018 08:40), Max: 97.4 (28 Aug 2018 15:55)  HR: 84 (29 Aug 2018 09:40) (81 - 89)  BP: 105/68 (29 Aug 2018 09:40) (74/40 - 105/68)  BP(mean): --  RR: 18 (29 Aug 2018 09:40) (18 - 18)  SpO2: 99% (29 Aug 2018 09:40) (93% - 99%)    PHYSICAL EXAM:    General: NAD  HEENT: NCAT; PERRL, anicteric sclera  Neck: supple, trachea midline  Cardiovascular: S1, S2 normal; RRR, no M/G/R  Respiratory: CTABL; no W/R/R  Gastrointestinal: soft, nontender, nondistended. bowel sounds present.  Skin: no ulcerations or visible rashes appreciated  Extremities: WWP; no edema, clubbing or cyanosis  Vascular: 2+ radial, DP/PT pulses B/L  Neurological: AAOx3; no focal deficits    MEDICATIONS:  MEDICATIONS  (STANDING):  acetaminophen   Tablet. 650 milliGRAM(s) Oral at bedtime  aspirin enteric coated 81 milliGRAM(s) Oral daily  atorvastatin 10 milliGRAM(s) Oral at bedtime  azithromycin   Tablet 500 milliGRAM(s) Oral <User Schedule>  BACItracin   Ointment 1 Application(s) Topical three times a day  buDESOnide   0.5 milliGRAM(s) Respule 0.5 milliGRAM(s) Inhalation every 12 hours  carbidopa/levodopa  25/100 1 Tablet(s) Oral <User Schedule>  cholecalciferol 1000 Unit(s) Oral daily  cyanocobalamin 1000 MICROGram(s) Oral daily  guaiFENesin ER 1200 milliGRAM(s) Oral every 12 hours  heparin  Injectable 5000 Unit(s) SubCutaneous every 8 hours  lidocaine   Patch 1 Patch Transdermal daily  potassium chloride   Powder 40 milliEquivalent(s) Oral daily  primidone 50 milliGRAM(s) Oral <User Schedule>  QUEtiapine 25 milliGRAM(s) Oral <User Schedule>  selegiline Oral Tab/Cap 5 milliGRAM(s) Oral <User Schedule>  sertraline 100 milliGRAM(s) Oral <User Schedule>  tiotropium 2.5 MICROgram(s)/olodaterol 2.5 MICROgram(s) (STIOLTO) Inhaler 2 Puff(s) Inhalation daily    MEDICATIONS  (PRN):  ALBUTerol/ipratropium for Nebulization 3 milliLiter(s) Nebulizer every 6 hours PRN Shortness of Breath and/or Wheezing  LORazepam   Injectable 0.5 milliGRAM(s) IV Push every 6 hours PRN Anxiety      ALLERGIES:  Allergies    No Known Allergies    Intolerances        LABS:                        9.3    8.4   )-----------( 423      ( 29 Aug 2018 06:43 )             31.4     08-29    139  |  103  |  31<H>  ----------------------------<  104<H>  4.6   |  23  |  0.75    Ca    9.1      29 Aug 2018 06:42  Mg     2.1     08-29          CAPILLARY BLOOD GLUCOSE          RADIOLOGY & ADDITIONAL TESTS: Reviewed. OVERNIGHT EVENTS: ILENE    SUBJECTIVE / INTERVAL HPI: Patient seen and examined at bedside. Pt feels ok. No complaints today. Denies SOB, CP, abd pain, no n/v.     VITAL SIGNS:  Vital Signs Last 24 Hrs  T(C): 36.2 (29 Aug 2018 08:40), Max: 36.3 (28 Aug 2018 15:55)  T(F): 97.2 (29 Aug 2018 08:40), Max: 97.4 (28 Aug 2018 15:55)  HR: 84 (29 Aug 2018 09:40) (81 - 89)  BP: 105/68 (29 Aug 2018 09:40) (74/40 - 105/68)  BP(mean): --  RR: 18 (29 Aug 2018 09:40) (18 - 18)  SpO2: 99% (29 Aug 2018 09:40) (93% - 99%)    PHYSICAL EXAM:    General: NAD  HEENT: NCAT; PERRL, anicteric sclera  Neck: supple, trachea midline  Cardiovascular: S1, S2 normal; RRR, no M/G/R  Respiratory: cta b/l. no wheezes/rales/rhonci  Gastrointestinal: soft, nontender, nondistended. bowel sounds present.  Skin:  3 abrasions on right leg  Extremities: WWP; no edema, clubbing or cyanosis  Vascular: 2+ radial, DP/PT pulses B/L  Neurological: AAOx3; no focal deficits    MEDICATIONS:  MEDICATIONS  (STANDING):  acetaminophen   Tablet. 650 milliGRAM(s) Oral at bedtime  aspirin enteric coated 81 milliGRAM(s) Oral daily  atorvastatin 10 milliGRAM(s) Oral at bedtime  azithromycin   Tablet 500 milliGRAM(s) Oral <User Schedule>  BACItracin   Ointment 1 Application(s) Topical three times a day  buDESOnide   0.5 milliGRAM(s) Respule 0.5 milliGRAM(s) Inhalation every 12 hours  carbidopa/levodopa  25/100 1 Tablet(s) Oral <User Schedule>  cholecalciferol 1000 Unit(s) Oral daily  cyanocobalamin 1000 MICROGram(s) Oral daily  guaiFENesin ER 1200 milliGRAM(s) Oral every 12 hours  heparin  Injectable 5000 Unit(s) SubCutaneous every 8 hours  lidocaine   Patch 1 Patch Transdermal daily  potassium chloride   Powder 40 milliEquivalent(s) Oral daily  primidone 50 milliGRAM(s) Oral <User Schedule>  QUEtiapine 25 milliGRAM(s) Oral <User Schedule>  selegiline Oral Tab/Cap 5 milliGRAM(s) Oral <User Schedule>  sertraline 100 milliGRAM(s) Oral <User Schedule>  tiotropium 2.5 MICROgram(s)/olodaterol 2.5 MICROgram(s) (STIOLTO) Inhaler 2 Puff(s) Inhalation daily    MEDICATIONS  (PRN):  ALBUTerol/ipratropium for Nebulization 3 milliLiter(s) Nebulizer every 6 hours PRN Shortness of Breath and/or Wheezing  LORazepam   Injectable 0.5 milliGRAM(s) IV Push every 6 hours PRN Anxiety      ALLERGIES:  Allergies    No Known Allergies    Intolerances        LABS:                        9.3    8.4   )-----------( 423      ( 29 Aug 2018 06:43 )             31.4     08-29    139  |  103  |  31<H>  ----------------------------<  104<H>  4.6   |  23  |  0.75    Ca    9.1      29 Aug 2018 06:42  Mg     2.1     08-29          CAPILLARY BLOOD GLUCOSE          RADIOLOGY & ADDITIONAL TESTS: Reviewed.

## 2018-08-29 NOTE — PROGRESS NOTE ADULT - PROBLEM SELECTOR PLAN 1
Resolved. Patient met 4/4 SIRS criteria - T 102, RR 25, , WBC 10.8 on admission. Lactate 1.3. RVP negative.  CT PE negative for PE but showed possible RUL consolidation.  - legionella urine antigen negative. Sepsis due to hospital acquired PNA.   -completed 7 day course vanc/zosyn  -continues to be afebrile  -continue to monitor WBCs  -c/w mucinex for secretions   -aerobika device and chest PT    #hypoxia: likely 2/2 atelectasis with resolving pna  -retest patient's o2 requirement with ambulation to determine whether short term home o2 is required.  -low to no suspicion for PE per pulm recs  -continue incentive spirometer, aerobika device, OOB as tolerated, PT as tolerated,   -c/w nebulized saline, mucinex

## 2018-08-30 ENCOUNTER — INBOUND DOCUMENT (OUTPATIENT)
Age: 73
End: 2018-08-30

## 2018-08-30 VITALS
RESPIRATION RATE: 18 BRPM | HEART RATE: 94 BPM | DIASTOLIC BLOOD PRESSURE: 65 MMHG | TEMPERATURE: 98 F | SYSTOLIC BLOOD PRESSURE: 111 MMHG | OXYGEN SATURATION: 90 %

## 2018-08-30 LAB
ANION GAP SERPL CALC-SCNC: 11 MMOL/L — SIGNIFICANT CHANGE UP (ref 5–17)
BUN SERPL-MCNC: 22 MG/DL — SIGNIFICANT CHANGE UP (ref 7–23)
CALCIUM SERPL-MCNC: 9.2 MG/DL — SIGNIFICANT CHANGE UP (ref 8.4–10.5)
CHLORIDE SERPL-SCNC: 106 MMOL/L — SIGNIFICANT CHANGE UP (ref 96–108)
CO2 SERPL-SCNC: 22 MMOL/L — SIGNIFICANT CHANGE UP (ref 22–31)
CREAT SERPL-MCNC: 0.79 MG/DL — SIGNIFICANT CHANGE UP (ref 0.5–1.3)
GLUCOSE SERPL-MCNC: 98 MG/DL — SIGNIFICANT CHANGE UP (ref 70–99)
HCT VFR BLD CALC: 29.6 % — LOW (ref 34.5–45)
HGB BLD-MCNC: 8.9 G/DL — LOW (ref 11.5–15.5)
MCHC RBC-ENTMCNC: 28.7 PG — SIGNIFICANT CHANGE UP (ref 27–34)
MCHC RBC-ENTMCNC: 30.1 G/DL — LOW (ref 32–36)
MCV RBC AUTO: 95.5 FL — SIGNIFICANT CHANGE UP (ref 80–100)
PLATELET # BLD AUTO: 393 K/UL — SIGNIFICANT CHANGE UP (ref 150–400)
POTASSIUM SERPL-MCNC: 4.4 MMOL/L — SIGNIFICANT CHANGE UP (ref 3.5–5.3)
POTASSIUM SERPL-SCNC: 4.4 MMOL/L — SIGNIFICANT CHANGE UP (ref 3.5–5.3)
RBC # BLD: 3.1 M/UL — LOW (ref 3.8–5.2)
RBC # FLD: 15.6 % — SIGNIFICANT CHANGE UP (ref 10.3–16.9)
SODIUM SERPL-SCNC: 139 MMOL/L — SIGNIFICANT CHANGE UP (ref 135–145)
WBC # BLD: 8.6 K/UL — SIGNIFICANT CHANGE UP (ref 3.8–10.5)
WBC # FLD AUTO: 8.6 K/UL — SIGNIFICANT CHANGE UP (ref 3.8–10.5)

## 2018-08-30 PROCEDURE — 80202 ASSAY OF VANCOMYCIN: CPT

## 2018-08-30 PROCEDURE — 86900 BLOOD TYPING SEROLOGIC ABO: CPT

## 2018-08-30 PROCEDURE — 82553 CREATINE MB FRACTION: CPT

## 2018-08-30 PROCEDURE — 83550 IRON BINDING TEST: CPT

## 2018-08-30 PROCEDURE — 92526 ORAL FUNCTION THERAPY: CPT | Mod: GN

## 2018-08-30 PROCEDURE — 87449 NOS EACH ORGANISM AG IA: CPT

## 2018-08-30 PROCEDURE — 87486 CHLMYD PNEUM DNA AMP PROBE: CPT

## 2018-08-30 PROCEDURE — 85027 COMPLETE CBC AUTOMATED: CPT

## 2018-08-30 PROCEDURE — 87040 BLOOD CULTURE FOR BACTERIA: CPT

## 2018-08-30 PROCEDURE — 86901 BLOOD TYPING SEROLOGIC RH(D): CPT

## 2018-08-30 PROCEDURE — 87633 RESP VIRUS 12-25 TARGETS: CPT

## 2018-08-30 PROCEDURE — 94660 CPAP INITIATION&MGMT: CPT

## 2018-08-30 PROCEDURE — 97110 THERAPEUTIC EXERCISES: CPT

## 2018-08-30 PROCEDURE — 93005 ELECTROCARDIOGRAM TRACING: CPT | Mod: 76

## 2018-08-30 PROCEDURE — 85610 PROTHROMBIN TIME: CPT

## 2018-08-30 PROCEDURE — 85025 COMPLETE CBC W/AUTO DIFF WBC: CPT

## 2018-08-30 PROCEDURE — 92610 EVALUATE SWALLOWING FUNCTION: CPT | Mod: GN

## 2018-08-30 PROCEDURE — 73030 X-RAY EXAM OF SHOULDER: CPT

## 2018-08-30 PROCEDURE — 83880 ASSAY OF NATRIURETIC PEPTIDE: CPT

## 2018-08-30 PROCEDURE — 82803 BLOOD GASES ANY COMBINATION: CPT

## 2018-08-30 PROCEDURE — 84100 ASSAY OF PHOSPHORUS: CPT

## 2018-08-30 PROCEDURE — 74230 X-RAY XM SWLNG FUNCJ C+: CPT

## 2018-08-30 PROCEDURE — 83605 ASSAY OF LACTIC ACID: CPT

## 2018-08-30 PROCEDURE — 86850 RBC ANTIBODY SCREEN: CPT

## 2018-08-30 PROCEDURE — 87798 DETECT AGENT NOS DNA AMP: CPT

## 2018-08-30 PROCEDURE — 85730 THROMBOPLASTIN TIME PARTIAL: CPT

## 2018-08-30 PROCEDURE — 92611 MOTION FLUOROSCOPY/SWALLOW: CPT | Mod: GN

## 2018-08-30 PROCEDURE — 71275 CT ANGIOGRAPHY CHEST: CPT

## 2018-08-30 PROCEDURE — 83735 ASSAY OF MAGNESIUM: CPT

## 2018-08-30 PROCEDURE — 84484 ASSAY OF TROPONIN QUANT: CPT

## 2018-08-30 PROCEDURE — 92612 ENDOSCOPY SWALLOW (FEES) VID: CPT | Mod: GN

## 2018-08-30 PROCEDURE — 80048 BASIC METABOLIC PNL TOTAL CA: CPT

## 2018-08-30 PROCEDURE — 97161 PT EVAL LOW COMPLEX 20 MIN: CPT

## 2018-08-30 PROCEDURE — 93306 TTE W/DOPPLER COMPLETE: CPT

## 2018-08-30 PROCEDURE — 87070 CULTURE OTHR SPECIMN AEROBIC: CPT

## 2018-08-30 PROCEDURE — 82550 ASSAY OF CK (CPK): CPT

## 2018-08-30 PROCEDURE — 36415 COLL VENOUS BLD VENIPUNCTURE: CPT

## 2018-08-30 PROCEDURE — 87581 M.PNEUMON DNA AMP PROBE: CPT

## 2018-08-30 PROCEDURE — 80053 COMPREHEN METABOLIC PANEL: CPT

## 2018-08-30 PROCEDURE — 96374 THER/PROPH/DIAG INJ IV PUSH: CPT | Mod: XU

## 2018-08-30 PROCEDURE — 71045 X-RAY EXAM CHEST 1 VIEW: CPT

## 2018-08-30 PROCEDURE — 97530 THERAPEUTIC ACTIVITIES: CPT

## 2018-08-30 PROCEDURE — 82728 ASSAY OF FERRITIN: CPT

## 2018-08-30 PROCEDURE — 84466 ASSAY OF TRANSFERRIN: CPT

## 2018-08-30 PROCEDURE — 94640 AIRWAY INHALATION TREATMENT: CPT

## 2018-08-30 PROCEDURE — 99239 HOSP IP/OBS DSCHRG MGMT >30: CPT

## 2018-08-30 PROCEDURE — 96375 TX/PRO/DX INJ NEW DRUG ADDON: CPT | Mod: XU

## 2018-08-30 PROCEDURE — 99291 CRITICAL CARE FIRST HOUR: CPT | Mod: 25

## 2018-08-30 PROCEDURE — 97116 GAIT TRAINING THERAPY: CPT

## 2018-08-30 RX ORDER — TIOTROPIUM BROMIDE AND OLODATEROL 3.124; 2.736 UG/1; UG/1
2 SPRAY, METERED RESPIRATORY (INHALATION)
Qty: 0 | Refills: 0 | COMMUNITY
Start: 2018-08-30

## 2018-08-30 RX ORDER — LIDOCAINE 4 G/100G
0 CREAM TOPICAL
Qty: 0 | Refills: 0 | COMMUNITY
Start: 2018-08-30

## 2018-08-30 RX ORDER — MIDODRINE HYDROCHLORIDE 2.5 MG/1
2.5 TABLET ORAL EVERY 8 HOURS
Qty: 0 | Refills: 0 | Status: DISCONTINUED | OUTPATIENT
Start: 2018-08-30 | End: 2018-08-30

## 2018-08-30 RX ORDER — PREGABALIN 225 MG/1
1 CAPSULE ORAL
Qty: 0 | Refills: 0 | COMMUNITY
Start: 2018-08-30

## 2018-08-30 RX ORDER — ASPIRIN/CALCIUM CARB/MAGNESIUM 324 MG
1 TABLET ORAL
Qty: 0 | Refills: 0 | COMMUNITY
Start: 2018-08-30

## 2018-08-30 RX ORDER — QUETIAPINE FUMARATE 200 MG/1
1 TABLET, FILM COATED ORAL
Qty: 0 | Refills: 0 | COMMUNITY
Start: 2018-08-30

## 2018-08-30 RX ORDER — CHOLECALCIFEROL (VITAMIN D3) 125 MCG
1000 CAPSULE ORAL
Qty: 0 | Refills: 0 | COMMUNITY
Start: 2018-08-30

## 2018-08-30 RX ORDER — MIDODRINE HYDROCHLORIDE 2.5 MG/1
5 TABLET ORAL EVERY 8 HOURS
Qty: 0 | Refills: 0 | Status: DISCONTINUED | OUTPATIENT
Start: 2018-08-30 | End: 2018-08-30

## 2018-08-30 RX ORDER — IPRATROPIUM/ALBUTEROL SULFATE 18-103MCG
3 AEROSOL WITH ADAPTER (GRAM) INHALATION
Qty: 0 | Refills: 0 | COMMUNITY
Start: 2018-08-30

## 2018-08-30 RX ORDER — PRIMIDONE 250 MG/1
1 TABLET ORAL
Qty: 0 | Refills: 0 | COMMUNITY
Start: 2018-08-30

## 2018-08-30 RX ORDER — BUDESONIDE, MICRONIZED 100 %
0 POWDER (GRAM) MISCELLANEOUS
Qty: 0 | Refills: 0 | COMMUNITY
Start: 2018-08-30

## 2018-08-30 RX ORDER — TIOTROPIUM BROMIDE AND OLODATEROL 3.124; 2.736 UG/1; UG/1
0 SPRAY, METERED RESPIRATORY (INHALATION)
Qty: 0 | Refills: 0 | COMMUNITY
Start: 2018-08-30

## 2018-08-30 RX ORDER — AZITHROMYCIN 500 MG/1
1 TABLET, FILM COATED ORAL
Qty: 0 | Refills: 0 | COMMUNITY
Start: 2018-08-30

## 2018-08-30 RX ORDER — CARBIDOPA AND LEVODOPA 25; 100 MG/1; MG/1
1.5 TABLET ORAL
Qty: 0 | Refills: 0 | COMMUNITY
Start: 2018-08-30

## 2018-08-30 RX ORDER — ATORVASTATIN CALCIUM 80 MG/1
1 TABLET, FILM COATED ORAL
Qty: 0 | Refills: 0 | COMMUNITY
Start: 2018-08-30

## 2018-08-30 RX ORDER — SERTRALINE 25 MG/1
1 TABLET, FILM COATED ORAL
Qty: 0 | Refills: 0 | COMMUNITY
Start: 2018-08-30

## 2018-08-30 RX ORDER — MIDODRINE HYDROCHLORIDE 2.5 MG/1
1 TABLET ORAL
Qty: 0 | Refills: 0 | COMMUNITY
Start: 2018-08-30

## 2018-08-30 RX ORDER — CARBIDOPA AND LEVODOPA 25; 100 MG/1; MG/1
1.5 TABLET ORAL
Qty: 0 | Refills: 0 | COMMUNITY

## 2018-08-30 RX ADMIN — CARBIDOPA AND LEVODOPA 1.5 TABLET(S): 25; 100 TABLET ORAL at 13:54

## 2018-08-30 RX ADMIN — SERTRALINE 100 MILLIGRAM(S): 25 TABLET, FILM COATED ORAL at 07:34

## 2018-08-30 RX ADMIN — HEPARIN SODIUM 5000 UNIT(S): 5000 INJECTION INTRAVENOUS; SUBCUTANEOUS at 06:23

## 2018-08-30 RX ADMIN — PREGABALIN 1000 MICROGRAM(S): 225 CAPSULE ORAL at 14:27

## 2018-08-30 RX ADMIN — Medication 1200 MILLIGRAM(S): at 06:23

## 2018-08-30 RX ADMIN — CARBIDOPA AND LEVODOPA 1.5 TABLET(S): 25; 100 TABLET ORAL at 07:34

## 2018-08-30 RX ADMIN — PRIMIDONE 50 MILLIGRAM(S): 250 TABLET ORAL at 07:34

## 2018-08-30 RX ADMIN — Medication 40 MILLIEQUIVALENT(S): at 14:01

## 2018-08-30 RX ADMIN — Medication 81 MILLIGRAM(S): at 13:52

## 2018-08-30 RX ADMIN — Medication 1 APPLICATION(S): at 01:04

## 2018-08-30 RX ADMIN — MIDODRINE HYDROCHLORIDE 5 MILLIGRAM(S): 2.5 TABLET ORAL at 13:51

## 2018-08-30 RX ADMIN — TIOTROPIUM BROMIDE AND OLODATEROL 2 PUFF(S): 3.124; 2.736 SPRAY, METERED RESPIRATORY (INHALATION) at 13:53

## 2018-08-30 RX ADMIN — CARBIDOPA AND LEVODOPA 1.5 TABLET(S): 25; 100 TABLET ORAL at 16:50

## 2018-08-30 RX ADMIN — Medication 1000 UNIT(S): at 13:53

## 2018-08-30 RX ADMIN — HEPARIN SODIUM 5000 UNIT(S): 5000 INJECTION INTRAVENOUS; SUBCUTANEOUS at 14:01

## 2018-08-30 NOTE — PROGRESS NOTE ADULT - PROBLEM SELECTOR PROBLEM 1
Gram-negative pneumonia
Gram-negative pneumonia
Acute respiratory failure with hypoxia
Gram-negative pneumonia
Hospital acquired PNA
Hospital acquired PNA
Sepsis due to pneumonia
Gram-negative pneumonia
Gram-negative pneumonia
Hospital acquired PNA
Sepsis due to pneumonia
Respiratory failure with hypoxia
Gram-negative pneumonia
Sepsis due to pneumonia

## 2018-08-30 NOTE — PROGRESS NOTE ADULT - PROBLEM SELECTOR PLAN 8
F - No IVF at this time.  E - replete for K < 4 and Mg < 2  N -  c/w regular diet with thin liquids and small sips to drink    Dispo: [facility]    DVT PPx: SCDs

## 2018-08-30 NOTE — PROGRESS NOTE ADULT - PROVIDER SPECIALTY LIST ADULT
Critical Care
Internal Medicine
Pulmonology
Cardiology
Internal Medicine

## 2018-08-30 NOTE — PROGRESS NOTE ADULT - ASSESSMENT
72F [facility] Parkinson's Disease, R. shoulder surgery (5da at [facility]) who was at rehab facility and experience acute onset shortness of breath admitted for HAP PNA and reactive airway disease.

## 2018-08-30 NOTE — PROGRESS NOTE ADULT - PROBLEM SELECTOR PLAN 3
Patient was orthostatic again today. BP lying down 101/61, HR 79, o2 sat 93 RA, sitting 101/54 HR 83, standing 71/43 HR 88, O2 94 on 2L. Possibly 2/2 Parkinson's disease vs cardiac-mitral regurg  -Neuro consulted. Recs appreciated.   -Per neuro recs Sinamet may be contributing to hypotension so decreased sinamet 25-100mg 1.5 tablet TID to 1 tablet TID and discontinued the nighttime long acting sinamet on 8/28  -was still orthostatic 8/29 so went back up to home dose sinamet 1.5 tab TID and discontinued the selegiline per Neuro recs.  -continued to be orthostatic today 8/30 so started midodrine 5mg PO tid

## 2018-08-30 NOTE — PROGRESS NOTE ADULT - PROBLEM SELECTOR PLAN 2
Patient with productive green sputum cough, acute SOB, met 4/4 SIRS criteria on admission T 102, RR 25, , WBC 10.8. Was at [facility] 1 week before admission for shoulder replacement surgery. Initial concern for PE given acuity of symptoms but CT PE negative. CT demonstrated RUL consolidation consistent with hospital-acquired PNA.   -finished 7 day course of vanc/zosyn 8/22  -azythromycin dosed to MWF for anti-inflammatory benefit, atypical coverage not thought to be necessary at this point  -f/u CXR with improvement in pulmonary edema  -negative blood cultures to date  -negative legionella   -negative RVP

## 2018-08-30 NOTE — PROGRESS NOTE ADULT - PROBLEM SELECTOR PLAN 7
Neuro consulted today due to orthostatic hypotension that may be due to Parkinson's dz.  -Carbidopa/levodopa may be contributing to orthostasis so on 8/28 decreased sinamet 25-100mg 1.5 tablet TID to 1 tablet TID and discontinued the nighttime long acting sinament   -was still orthostatic 8/29 so went back up to home dose sinamet 1.5 tab TID and discontinued the selegiline per Neuro recs.  -still orthostatic 8/30 so started midodrine 5mg PO tid  -will continue to monitor

## 2018-08-30 NOTE — PROGRESS NOTE ADULT - SUBJECTIVE AND OBJECTIVE BOX
OVERNIGHT EVENTS: ILENE    SUBJECTIVE / INTERVAL HPI: Patient seen and examined at bedside.     VITAL SIGNS:  Vital Signs Last 24 Hrs  T(C): 36.7 (30 Aug 2018 15:47), Max: 36.9 (29 Aug 2018 21:13)  T(F): 98.1 (30 Aug 2018 15:47), Max: 98.4 (29 Aug 2018 21:13)  HR: 94 (30 Aug 2018 15:47) (78 - 94)  BP: 111/65 (30 Aug 2018 15:47) (87/48 - 126/73)  BP(mean): --  RR: 18 (30 Aug 2018 15:47) (16 - 19)  SpO2: 90% (30 Aug 2018 15:47) (90% - 99%)    PHYSICAL EXAM:    General: NAD  HEENT: NCAT; PERRL, anicteric sclera  Neck: supple, trachea midline  Cardiovascular: S1, S2 normal; RRR, no M/G/R  Respiratory: cta b/l. no wheezes/rales/rhonci  Gastrointestinal: soft, nontender, nondistended. bowel sounds present.  Skin:  4 abrasions on right leg, nonpurulent no swelling, no edema  Extremities: WWP; no edema, clubbing or cyanosis  Vascular: 2+ radial, DP/PT pulses B/L  Neurological: AAOx3; no focal deficits    MEDICATIONS:  MEDICATIONS  (STANDING):  acetaminophen   Tablet. 650 milliGRAM(s) Oral at bedtime  aspirin enteric coated 81 milliGRAM(s) Oral daily  atorvastatin 10 milliGRAM(s) Oral at bedtime  azithromycin   Tablet 500 milliGRAM(s) Oral <User Schedule>  BACItracin   Ointment 1 Application(s) Topical three times a day  buDESOnide   0.5 milliGRAM(s) Respule 0.5 milliGRAM(s) Inhalation every 12 hours  carbidopa/levodopa  25/100 1.5 Tablet(s) Oral <User Schedule>  cholecalciferol 1000 Unit(s) Oral daily  cyanocobalamin 1000 MICROGram(s) Oral daily  guaiFENesin ER 1200 milliGRAM(s) Oral every 12 hours  heparin  Injectable 5000 Unit(s) SubCutaneous every 8 hours  lidocaine   Patch 1 Patch Transdermal daily  midodrine 5 milliGRAM(s) Oral every 8 hours  potassium chloride   Powder 40 milliEquivalent(s) Oral daily  primidone 50 milliGRAM(s) Oral <User Schedule>  QUEtiapine 25 milliGRAM(s) Oral <User Schedule>  sertraline 100 milliGRAM(s) Oral <User Schedule>  tiotropium 2.5 MICROgram(s)/olodaterol 2.5 MICROgram(s) (STIOLTO) Inhaler 2 Puff(s) Inhalation daily    MEDICATIONS  (PRN):  ALBUTerol/ipratropium for Nebulization 3 milliLiter(s) Nebulizer every 6 hours PRN Shortness of Breath and/or Wheezing  LORazepam   Injectable 0.5 milliGRAM(s) IV Push every 6 hours PRN Anxiety      ALLERGIES:  Allergies    No Known Allergies    Intolerances        LABS:                        8.9    8.6   )-----------( 393      ( 30 Aug 2018 06:04 )             29.6     08-30    139  |  106  |  22  ----------------------------<  98  4.4   |  22  |  0.79    Ca    9.2      30 Aug 2018 06:04  Mg     2.1     08-29          CAPILLARY BLOOD GLUCOSE          RADIOLOGY & ADDITIONAL TESTS: Reviewed. OVERNIGHT EVENTS: ILENE    SUBJECTIVE / INTERVAL HPI: Patient seen and examined at bedside.  Pt doing well. NO CP, no SOB, no abd pain.     VITAL SIGNS:  Vital Signs Last 24 Hrs  T(C): 36.7 (30 Aug 2018 15:47), Max: 36.9 (29 Aug 2018 21:13)  T(F): 98.1 (30 Aug 2018 15:47), Max: 98.4 (29 Aug 2018 21:13)  HR: 94 (30 Aug 2018 15:47) (78 - 94)  BP: 111/65 (30 Aug 2018 15:47) (87/48 - 126/73)  BP(mean): --  RR: 18 (30 Aug 2018 15:47) (16 - 19)  SpO2: 90% (30 Aug 2018 15:47) (90% - 99%)    PHYSICAL EXAM:    General: NAD  HEENT: NCAT; PERRL, anicteric sclera  Neck: supple, trachea midline  Cardiovascular: S1, S2 normal; RRR, no M/G/R  Respiratory: cta b/l. no wheezes/rales/rhonci  Gastrointestinal: soft, nontender, nondistended. bowel sounds present.  Skin:  4 abrasions on right leg, nonpurulent no swelling, no edema  Extremities: WWP; no edema, clubbing or cyanosis  Vascular: 2+ radial, DP/PT pulses B/L  Neurological: AAOx3; no focal deficits    MEDICATIONS:  MEDICATIONS  (STANDING):  acetaminophen   Tablet. 650 milliGRAM(s) Oral at bedtime  aspirin enteric coated 81 milliGRAM(s) Oral daily  atorvastatin 10 milliGRAM(s) Oral at bedtime  azithromycin   Tablet 500 milliGRAM(s) Oral <User Schedule>  BACItracin   Ointment 1 Application(s) Topical three times a day  buDESOnide   0.5 milliGRAM(s) Respule 0.5 milliGRAM(s) Inhalation every 12 hours  carbidopa/levodopa  25/100 1.5 Tablet(s) Oral <User Schedule>  cholecalciferol 1000 Unit(s) Oral daily  cyanocobalamin 1000 MICROGram(s) Oral daily  guaiFENesin ER 1200 milliGRAM(s) Oral every 12 hours  heparin  Injectable 5000 Unit(s) SubCutaneous every 8 hours  lidocaine   Patch 1 Patch Transdermal daily  midodrine 5 milliGRAM(s) Oral every 8 hours  potassium chloride   Powder 40 milliEquivalent(s) Oral daily  primidone 50 milliGRAM(s) Oral <User Schedule>  QUEtiapine 25 milliGRAM(s) Oral <User Schedule>  sertraline 100 milliGRAM(s) Oral <User Schedule>  tiotropium 2.5 MICROgram(s)/olodaterol 2.5 MICROgram(s) (STIOLTO) Inhaler 2 Puff(s) Inhalation daily    MEDICATIONS  (PRN):  ALBUTerol/ipratropium for Nebulization 3 milliLiter(s) Nebulizer every 6 hours PRN Shortness of Breath and/or Wheezing  LORazepam   Injectable 0.5 milliGRAM(s) IV Push every 6 hours PRN Anxiety      ALLERGIES:  Allergies    No Known Allergies    Intolerances        LABS:                        8.9    8.6   )-----------( 393      ( 30 Aug 2018 06:04 )             29.6     08-30    139  |  106  |  22  ----------------------------<  98  4.4   |  22  |  0.79    Ca    9.2      30 Aug 2018 06:04  Mg     2.1     08-29          CAPILLARY BLOOD GLUCOSE          RADIOLOGY & ADDITIONAL TESTS: Reviewed.

## 2018-08-30 NOTE — PROGRESS NOTE ADULT - PROBLEM SELECTOR PLAN 4
Pt was found to have mod-severe mitral regurg on echo with EF>60%. Has history of RF as child.  -enalapril 5mg/day dc in setting of persistent orthostatic hypotension  -as per CT sx, will f/u as outpatient with Dr. Pacheco  -Dr. Arechiga following. Recs appreciated. Low criteria for PE-no need for V/q  -Will f/u as outpt with Dr. Dee to assess volume status.  -lasix discontinued 8/27    #pulm htn  Echo 8/20 demonstrated EF 65%, dilated left atrium, moderate to severe MR, pulmonary artery systolic pressure 50mmhg. type II  -c/w PO enalapril to 5 mg daily.  -see above

## 2018-08-30 NOTE — PROGRESS NOTE ADULT - PROBLEM SELECTOR PLAN 6
pt had recent shoulder replacement prior to admission. Was complaining of severe R shoulder pain. Shoulder xray 8/22 showed Periprosthetic fracture of the right humerus. Ortho surgeon from [facility] contacted  -hold off on shoulder PT for now  -arm in sling for stabilization  -will f/u with [facility] orthopedist    #HLD  -c/w Lipitor

## 2018-09-04 PROBLEM — G20 PARKINSON'S DISEASE: Chronic | Status: ACTIVE | Noted: 2018-08-16

## 2018-09-04 PROBLEM — E78.00 PURE HYPERCHOLESTEROLEMIA, UNSPECIFIED: Chronic | Status: ACTIVE | Noted: 2018-08-17

## 2018-09-05 DIAGNOSIS — J98.11 ATELECTASIS: ICD-10-CM

## 2018-09-05 DIAGNOSIS — A41.9 SEPSIS, UNSPECIFIED ORGANISM: ICD-10-CM

## 2018-09-05 DIAGNOSIS — J44.9 CHRONIC OBSTRUCTIVE PULMONARY DISEASE, UNSPECIFIED: ICD-10-CM

## 2018-09-05 DIAGNOSIS — G20 PARKINSON'S DISEASE: ICD-10-CM

## 2018-09-05 DIAGNOSIS — E78.00 PURE HYPERCHOLESTEROLEMIA, UNSPECIFIED: ICD-10-CM

## 2018-09-05 DIAGNOSIS — I34.0 NONRHEUMATIC MITRAL (VALVE) INSUFFICIENCY: ICD-10-CM

## 2018-09-05 DIAGNOSIS — Z96.611 PRESENCE OF RIGHT ARTIFICIAL SHOULDER JOINT: ICD-10-CM

## 2018-09-05 DIAGNOSIS — J45.901 UNSPECIFIED ASTHMA WITH (ACUTE) EXACERBATION: ICD-10-CM

## 2018-09-05 DIAGNOSIS — I95.1 ORTHOSTATIC HYPOTENSION: ICD-10-CM

## 2018-09-05 DIAGNOSIS — J96.01 ACUTE RESPIRATORY FAILURE WITH HYPOXIA: ICD-10-CM

## 2018-09-05 DIAGNOSIS — M97.01XA PERIPROSTHETIC FRACTURE AROUND INTERNAL PROSTHETIC RIGHT HIP JOINT, INITIAL ENCOUNTER: ICD-10-CM

## 2018-09-05 DIAGNOSIS — J15.6 PNEUMONIA DUE TO OTHER GRAM-NEGATIVE BACTERIA: ICD-10-CM

## 2018-09-20 ENCOUNTER — APPOINTMENT (OUTPATIENT)
Dept: PULMONOLOGY | Facility: CLINIC | Age: 73
End: 2018-09-20
Payer: MEDICARE

## 2018-09-20 VITALS
HEART RATE: 89 BPM | HEIGHT: 64 IN | OXYGEN SATURATION: 93 % | DIASTOLIC BLOOD PRESSURE: 70 MMHG | SYSTOLIC BLOOD PRESSURE: 90 MMHG | WEIGHT: 123 LBS | TEMPERATURE: 97.9 F | RESPIRATION RATE: 12 BRPM | BODY MASS INDEX: 21 KG/M2

## 2018-09-20 DIAGNOSIS — Z82.3 FAMILY HISTORY OF STROKE: ICD-10-CM

## 2018-09-20 DIAGNOSIS — S42.90XA FRACTURE OF UNSPECIFIED SHOULDER GIRDLE, PART UNSPECIFIED, INITIAL ENCOUNTER FOR CLOSED FRACTURE: ICD-10-CM

## 2018-09-20 DIAGNOSIS — Z80.1 FAMILY HISTORY OF MALIGNANT NEOPLASM OF TRACHEA, BRONCHUS AND LUNG: ICD-10-CM

## 2018-09-20 DIAGNOSIS — J45.909 UNSPECIFIED ASTHMA, UNCOMPLICATED: ICD-10-CM

## 2018-09-20 DIAGNOSIS — Z82.49 FAMILY HISTORY OF ISCHEMIC HEART DISEASE AND OTHER DISEASES OF THE CIRCULATORY SYSTEM: ICD-10-CM
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
## 2018-09-20 DIAGNOSIS — I34.0 NONRHEUMATIC MITRAL (VALVE) INSUFFICIENCY: ICD-10-CM

## 2018-09-20 DIAGNOSIS — Z86.69 PERSONAL HISTORY OF OTHER DISEASES OF THE NERVOUS SYSTEM AND SENSE ORGANS: ICD-10-CM

## 2018-09-20 DIAGNOSIS — E78.5 HYPERLIPIDEMIA, UNSPECIFIED: ICD-10-CM

## 2018-09-20 DIAGNOSIS — Z87.891 PERSONAL HISTORY OF NICOTINE DEPENDENCE: ICD-10-CM

## 2018-09-20 DIAGNOSIS — Z83.1 FAMILY HISTORY OF OTHER INFECTIOUS AND PARASITIC DISEASES: ICD-10-CM

## 2018-09-20 DIAGNOSIS — J18.9 PNEUMONIA, UNSPECIFIED ORGANISM: ICD-10-CM

## 2018-09-20 PROCEDURE — 99214 OFFICE O/P EST MOD 30 MIN: CPT

## 2018-09-20 RX ORDER — SERTRALINE HYDROCHLORIDE 100 MG/1
100 TABLET, FILM COATED ORAL DAILY
Refills: 0 | Status: ACTIVE | COMMUNITY
Start: 2018-09-20

## 2018-09-20 RX ORDER — PRIMIDONE 50 MG/1
50 TABLET ORAL
Refills: 0 | Status: ACTIVE | COMMUNITY
Start: 2018-09-20

## 2018-09-20 RX ORDER — ATORVASTATIN CALCIUM 10 MG/1
10 TABLET, FILM COATED ORAL
Qty: 90 | Refills: 3 | Status: ACTIVE | COMMUNITY
Start: 2018-09-20

## 2018-09-20 RX ORDER — ALBUTEROL SULFATE 90 UG/1
108 (90 BASE) AEROSOL, METERED RESPIRATORY (INHALATION)
Qty: 1 | Refills: 11 | Status: ACTIVE | COMMUNITY
Start: 2018-09-20 | End: 1900-01-01

## 2018-09-20 RX ORDER — CARBIDOPA AND LEVODOPA 25; 100 MG/1; MG/1
25-100 TABLET ORAL 3 TIMES DAILY
Refills: 0 | Status: ACTIVE | COMMUNITY
Start: 2018-09-20

## 2018-09-20 RX ORDER — QUETIAPINE FUMARATE 25 MG/1
25 TABLET ORAL
Refills: 0 | Status: ACTIVE | COMMUNITY
Start: 2018-09-20

## 2018-09-20 RX ORDER — SELEGILINE HYDROCHLORIDE 5 MG/1
5 CAPSULE ORAL TWICE DAILY
Refills: 0 | Status: ACTIVE | COMMUNITY
Start: 2018-09-20

## 2018-10-02 ENCOUNTER — APPOINTMENT (OUTPATIENT)
Dept: CARDIOTHORACIC SURGERY | Facility: CLINIC | Age: 73
End: 2018-10-02

## 2018-10-19 ENCOUNTER — APPOINTMENT (OUTPATIENT)
Dept: PULMONOLOGY | Facility: CLINIC | Age: 73
End: 2018-10-19
Payer: MEDICARE

## 2018-10-19 PROCEDURE — 94729 DIFFUSING CAPACITY: CPT

## 2018-10-19 PROCEDURE — 94060 EVALUATION OF WHEEZING: CPT

## 2018-10-19 PROCEDURE — 94727 GAS DIL/WSHOT DETER LNG VOL: CPT

## 2019-03-15 ENCOUNTER — TRANSCRIPTION ENCOUNTER (OUTPATIENT)
Age: 74
End: 2019-03-15

## 2019-05-18 ENCOUNTER — TRANSCRIPTION ENCOUNTER (OUTPATIENT)
Age: 74
End: 2019-05-18

## 2019-06-26 NOTE — PROGRESS NOTE ADULT - PROBLEM SELECTOR PLAN 1
Patient with productive green sputum cough, acute SOB, met 4/4 SIRS criteria on admission T 102, RR 25, , WBC 10.8. Was at [facility] 1 week before admission for shoulder replacement surgery. Initial concern for PE given acuity of symptoms but CT PE negative. CT demonstrated RUL consolidation consistent with hospital-acquired PNA.   -finished 7 day course of vanc/zosyn 8/22  -azythromycin dosed to MWF for anti-inflammatory benefit, atypical coverage not thought to be necessary at this point  -f/u CXR with improvement in pulmonary edema  -negative blood cultures to date  -negative legionella   -negative RVP DC instructions

## 2020-11-16 NOTE — CHART NOTE - NSCHARTNOTEFT_GEN_A_CORE
Discussed with Dr. Delgado.  Patient has active PNA, on treatment currently.  Not an ideal time to consider any invasive intervention on her valve.  She is stable from the mitral valve standpoint and can follow up with Dr. Delgado out-pt.  Please have her F/U in 2-3 weeks at [address] [place] [address] 4th floor.  Office number #[phone]. Doing well.

## 2020-12-08 NOTE — PATIENT PROFILE ADULT. - NS SC CAGE ALCOHOL EYE OPENER
Patient scheduled for appointment for Tuesday at [facility]. Patient will call back if she cannot keep appointment.   no

## 2021-03-11 ENCOUNTER — TRANSCRIPTION ENCOUNTER (OUTPATIENT)
Age: 76
End: 2021-03-11

## 2021-08-09 NOTE — PATIENT PROFILE ADULT. - AS SC BRADEN ACTIVITY
verbalizes understanding/demonstrates understanding of teaching/needs met verbalizes understanding/demonstrates understanding of teaching/needs met/discharge criteria met (3) walks occasionally

## 2021-10-25 ENCOUNTER — TRANSCRIPTION ENCOUNTER (OUTPATIENT)
Age: 76
End: 2021-10-25

## 2022-06-11 NOTE — SWALLOW VFSS/MBS ASSESSMENT ADULT - ORAL PHASE COMMENTS
Oral stage was significant for prolonged A-P bolus transport
Attending Attestation (For Attendings USE Only)...

## 2022-09-26 ENCOUNTER — NON-APPOINTMENT (OUTPATIENT)
Age: 77
End: 2022-09-26

## 2023-01-29 ENCOUNTER — NON-APPOINTMENT (OUTPATIENT)
Age: 78
End: 2023-01-29

## 2023-02-02 NOTE — SWALLOW BEDSIDE ASSESSMENT ADULT - ORAL PREPARATORY PHASE
7613- Assessment complete. Patient alert and oriented X 4. Vitals signs obtained and stable. Patient noted to be coughing up bright red blood. Patient denies and shortness of breath or pain. Sputum specimen sent to lab. Will continue to monitor. Call light in reach. 1550- Lab called and asked to draw patient's HIV screen that was ordered. Electronically signed by Kiley Holder on 2/2/2023 at 3:58 PM Within functional limits

## 2023-05-02 NOTE — ED ADULT TRIAGE NOTE - HEIGHT IN FEET
well developed, well nourished , in no acute distress , ambulating without difficulty , normal communication ability 5

## 2024-02-12 NOTE — DIETITIAN INITIAL EVALUATION ADULT. - DIET TYPE
No care due was identified.  [facility] Embedded Care Due Messages. Reference number: [id number].   2/12/2024 3:58:11 PM CST  
Requesting refill for   Trazodone    Last visit 12/28/2023  Next visit  7/5/2024  
NPO

## 2024-04-05 NOTE — PATIENT PROFILE ADULT. - NS TRANSFER RESPONSE BELONGING
Patient verified name and .  Order for consent  found in EHR and matches case posting; patient verifies procedure.   Type 1a surgery, PAT phone assessment complete.  Orders received.  Labs per surgeon: UA for DOS  Labs per anesthesia protocol: none    Patient answered medical/surgical history questions at their best of ability. All prior to admission medications documented in EPIC.    Patient instructed to continue taking all prescription medications up to the day of surgery but to take only the following medications the day of surgery according to anesthesia guidelines with a small sip of water: duloxetine, levothyroxine, Prilosec.       Patient informed that all vitamins and supplements should be held 7 days prior to surgery and NSAIDS 5 days prior to surgery. Prescription meds to hold:vitamins and supplements, preventative only 81 mg aspirin (denies any hx of cardiac stents)    Patient instructed on the following:    > Arrive at Main Entrance, time of arrival to be called the day before by 1700  > NPO after midnight, unless otherwise indicated, including gum, mints, and ice chips  > Responsible adult must drive patient to the hospital, stay during surgery, and patient will need supervision 24 hours after anesthesia  > Use non moisturizing soap in shower the night before surgery and on the morning of surgery  > All piercings must be removed prior to arrival.    > Leave all valuables (money and jewelry) at home but bring insurance card and ID on DOS.   > You may be required to pay a deductible or co-pay on the day of your procedure. You can pre-pay by calling [phone] if your surgery is at the [facility] or [phone] if your surgery is at the [facility].  > Do not wear make-up, nail polish, lotions, cologne, perfumes, powders, or oil on skin. Artificial nails are not permitted.     
yes

## 2024-11-29 NOTE — ED ADULT TRIAGE NOTE - TEMPERATURE IN FAHRENHEIT (DEGREES F)
100.3
My signature below certifies that the above stated patient is homebound and upon completion of the Face-To-Face encounter, has the need for intermittent skilled nursing, physical therapy and/or speech or occupational therapy services in their home for their current diagnosis as outlined in their initial plan of care. These services will continue to be monitored by myself or another physician.

## 2024-12-18 NOTE — PROGRESS NOTE ADULT - PROBLEM SELECTOR PROBLEM 2
Gram-negative pneumonia
Sepsis due to pneumonia
Reactive airway disease with acute exacerbation, unspecified asthma severity, unspecified whether persistent
Sepsis due to pneumonia
Gram-negative pneumonia
Reactive airway disease with acute exacerbation, unspecified asthma severity, unspecified whether persistent
Sepsis due to pneumonia
Gram-negative pneumonia
Gram-negative pneumonia
Sepsis due to pneumonia
No

## 2025-06-07 ENCOUNTER — NON-APPOINTMENT (OUTPATIENT)
Age: 80
End: 2025-06-07
